# Patient Record
Sex: MALE | Race: WHITE | NOT HISPANIC OR LATINO | Employment: OTHER | URBAN - METROPOLITAN AREA
[De-identification: names, ages, dates, MRNs, and addresses within clinical notes are randomized per-mention and may not be internally consistent; named-entity substitution may affect disease eponyms.]

---

## 2017-01-03 ENCOUNTER — HOSPITAL ENCOUNTER (OUTPATIENT)
Dept: RADIOLOGY | Facility: HOSPITAL | Age: 72
Discharge: HOME | End: 2017-01-03
Attending: ANESTHESIOLOGY | Admitting: ANESTHESIOLOGY
Payer: MEDICARE

## 2017-01-03 DIAGNOSIS — M51.36 DDD (DEGENERATIVE DISC DISEASE), LUMBAR: ICD-10-CM

## 2017-01-04 ENCOUNTER — TELEPHONE (OUTPATIENT)
Dept: OPHTHALMOLOGY | Facility: CLINIC | Age: 72
End: 2017-01-04

## 2017-01-04 NOTE — TELEPHONE ENCOUNTER
----- Message from Amira Finley MA sent at 1/3/2017 11:41 AM CST -----  Contact: pt        ----- Message -----     From: RT Crystal     Sent: 1/3/2017   9:23 AM       To: [facility] Staff    pt , requesting an appt to have his eye lids scrapped again, thanks.

## 2017-01-16 ENCOUNTER — OFFICE VISIT (OUTPATIENT)
Dept: NEUROSURGERY | Facility: CLINIC | Age: 72
End: 2017-01-16
Payer: MEDICARE

## 2017-01-16 VITALS
BODY MASS INDEX: 28.58 KG/M2 | WEIGHT: 182.13 LBS | HEIGHT: 67 IN | DIASTOLIC BLOOD PRESSURE: 85 MMHG | SYSTOLIC BLOOD PRESSURE: 146 MMHG | TEMPERATURE: 99 F | HEART RATE: 80 BPM

## 2017-01-16 DIAGNOSIS — M51.37 DEGENERATION OF LUMBAR OR LUMBOSACRAL INTERVERTEBRAL DISC: ICD-10-CM

## 2017-01-16 DIAGNOSIS — M47.816 FACET HYPERTROPHY OF LUMBAR REGION: ICD-10-CM

## 2017-01-16 DIAGNOSIS — M54.16 LUMBAR RADICULOPATHY: ICD-10-CM

## 2017-01-16 DIAGNOSIS — E78.1 HYPERTRIGLYCERIDEMIA: ICD-10-CM

## 2017-01-16 DIAGNOSIS — M47.819 FACET ARTHROPATHY: ICD-10-CM

## 2017-01-16 DIAGNOSIS — M48.061 LUMBAR STENOSIS: ICD-10-CM

## 2017-01-16 DIAGNOSIS — M54.12 CERVICAL RADICULOPATHY: Primary | ICD-10-CM

## 2017-01-16 PROCEDURE — 99499 UNLISTED E&M SERVICE: CPT | Mod: S$GLB,,, | Performed by: NEUROLOGICAL SURGERY

## 2017-01-16 PROCEDURE — 1160F RVW MEDS BY RX/DR IN RCRD: CPT | Mod: S$GLB,,, | Performed by: NEUROLOGICAL SURGERY

## 2017-01-16 PROCEDURE — 99214 OFFICE O/P EST MOD 30 MIN: CPT | Mod: S$GLB,,, | Performed by: NEUROLOGICAL SURGERY

## 2017-01-16 PROCEDURE — 1159F MED LIST DOCD IN RCRD: CPT | Mod: S$GLB,,, | Performed by: NEUROLOGICAL SURGERY

## 2017-01-16 PROCEDURE — 1157F ADVNC CARE PLAN IN RCRD: CPT | Mod: S$GLB,,, | Performed by: NEUROLOGICAL SURGERY

## 2017-01-16 PROCEDURE — 1125F AMNT PAIN NOTED PAIN PRSNT: CPT | Mod: S$GLB,,, | Performed by: NEUROLOGICAL SURGERY

## 2017-01-16 NOTE — MR AVS SNAPSHOT
[facility]  [address] [facility] [place] [address]  Phone: [phone]  Fax: [phone]                  Luke Duff   2017 3:00 PM   Office Visit    Description:  Male : 1945   Provider:  Meredith Garcia MD   Department:  [facility] Neurosurgery           Reason for Visit     Lumbar Spine Pain (L-Spine)           Diagnoses this Visit        Comments    Cervical radiculopathy    -  Primary     Lumbar radiculopathy                To Do List           Future Appointments        Provider Department Dept Phone    2017 8:30 AM Jazz Abraham MD [facility] Ophthalmology [phone]    3/20/2017 9:30 AM [facility] MRI1 [facility] [phone]    3/20/2017 10:30 AM Meredith Garcia MD [facility] Neurosurgery [phone]      Goals (5 Years of Data)     None      [facility]     [facility] Line - 24/7 Assistance  Registered nurses in the [facility] provide clinical advisement, health education, appointment booking, and other advisory services.  Call for this free service at [phone].             Medications           Message regarding Medications     Verify the changes and/or additions to your medication regime listed below are the same as discussed with your clinician today.  If any of these changes or additions are incorrect, please notify your healthcare provider.             Verify that the below list of medications is an accurate representation of the medications you are currently taking.  If none reported, the list may be blank. If incorrect, please contact your healthcare provider. Carry this list with you in case of emergency.           Current Medications     ASPIRIN/SALICYLAMIDE/CAFFEINE (BC HEADACHE POWDER ORAL) Take 1 application by mouth as needed.     butalbital-acetaminophen-caffeine -40 mg (FIORICET, ESGIC) -40 mg per tablet Take 1 tablet by mouth every 6 (six) hours as  "needed.     ciprofloxacin HCl (CIPRO) 250 MG tablet Take 1 tablet (250 mg total) by mouth 2 (two) times daily.    cyclobenzaprine (FLEXERIL) 5 MG tablet Take 1 tablet (5 mg total) by mouth nightly as needed for Muscle spasms.    esomeprazole (NEXIUM) 40 MG capsule Take 1 capsule (40 mg total) by mouth before breakfast.    nutritional supplement - fiber Liqd Take by mouth. Juice plus    UNABLE TO FIND Take 1 Dose by mouth once daily. medication name: Digestive aid: herbal           Clinical Reference Information           Vital Signs - Last Recorded  Most recent update: 1/16/2017  2:55 PM by Crystal Sharma LPN    BP Pulse Temp Ht Wt BMI    (!) 146/85 80 98.5 °F (36.9 °C) (Oral) 5' 7" (1.702 m) 82.6 kg (182 lb 1.6 oz) 28.52 kg/m2      Blood Pressure          Most Recent Value    BP  (!)  146/85      Allergies as of 1/16/2017     Codeine      Immunizations Administered on Date of Encounter - 1/16/2017     None      Orders Placed During Today's Visit      Normal Orders This Visit    Ambulatory Referral to Physical Medicine Rehab     Future Labs/Procedures Expected by Expires    MRI Cervical Spine Without Contrast  1/16/2017 1/16/2018    EMG - 2 Extremities  As directed 1/16/2018    EMG - 2 Extremities  As directed 1/16/2018      [facility] Sign-Up     Activating your [facility] account is as easy as 1-2-3!     1) Visit [email or web address], select Sign Up Now, enter this activation code and your date of birth, then select Next.  [email or web address]  Expires: 3/2/2017  2:32 PM      2) Create a username and password to use when you visit [facility] in the future and select a security question in case you lose your password and select Next.    3) Enter your e-mail address and click Sign Up!    Additional Information  If you have questions, please e-mail [email or web address] or call [phone] to talk to our [facility] staff. Remember, [facility] is NOT to be used for urgent needs. For medical emergencies, dial 911.         "

## 2017-01-17 ENCOUNTER — OFFICE VISIT (OUTPATIENT)
Dept: OPHTHALMOLOGY | Facility: CLINIC | Age: 72
End: 2017-01-17
Payer: MEDICARE

## 2017-01-17 DIAGNOSIS — Z96.1 PSEUDOPHAKIA OF BOTH EYES: ICD-10-CM

## 2017-01-17 DIAGNOSIS — H02.88B MEIBOMIAN GLAND DYSFUNCTION (MGD) OF UPPER AND LOWER LIDS OF BOTH EYES: ICD-10-CM

## 2017-01-17 DIAGNOSIS — H52.7 REFRACTIVE ERROR: ICD-10-CM

## 2017-01-17 DIAGNOSIS — H11.123 CONJUNCTIVAL CONCRETIONS OF BOTH EYES: ICD-10-CM

## 2017-01-17 DIAGNOSIS — H53.8 BLURRED VISION: Primary | ICD-10-CM

## 2017-01-17 DIAGNOSIS — H02.88A MEIBOMIAN GLAND DYSFUNCTION (MGD) OF UPPER AND LOWER LIDS OF BOTH EYES: ICD-10-CM

## 2017-01-17 PROCEDURE — 99999 PR PBB SHADOW E&M-EST. PATIENT-LVL II: CPT | Mod: PBBFAC,,, | Performed by: OPHTHALMOLOGY

## 2017-01-17 PROCEDURE — 92014 COMPRE OPH EXAM EST PT 1/>: CPT | Mod: S$GLB,,, | Performed by: OPHTHALMOLOGY

## 2017-01-17 RX ORDER — NEOMYCIN SULFATE, POLYMYXIN B SULFATE, AND DEXAMETHASONE 3.5; 10000; 1 MG/G; [USP'U]/G; MG/G
OINTMENT OPHTHALMIC NIGHTLY
Qty: 3.5 G | Refills: 0 | Status: SHIPPED | OUTPATIENT
Start: 2017-01-17 | End: 2017-08-22 | Stop reason: ALTCHOICE

## 2017-01-17 NOTE — PATIENT INSTRUCTIONS
"  I recommend that you use artificial tears 2-3 times daily. Recommended brands include Systane, Refresh, Genteal, and Thera-tears. Generic drops are okay too. Avoid any drops that say they "get the red out" - these should not be used on a regular basis.    "

## 2017-01-17 NOTE — MR AVS SNAPSHOT
[facility]  [address]  [facility] [address]  Phone: [phone]  Fax: [phone]                  Luke Duff   2017 8:30 AM   Office Visit    Description:  Male : 1945   Provider:  Jazz Abraham MD   Department:  [facility]mology           Reason for Visit     Eye Problem     Blurred Vision           Diagnoses this Visit        Comments    Blurred vision    -  Primary     Meibomian gland dysfunction (MGD) of upper and lower lids of both eyes         Pseudophakia of both eyes         Refractive error         Conjunctival concretions of both eyes                To Do List           Future Appointments        Provider Department Dept Phone    2017 12:00 PM [facility] MRI1 [facility] [phone]    2017 1:00 PM Meredith Garcia MD [facility] [phone]      Goals (5 Years of Data)     None      Follow-Up and Disposition     Return if symptoms worsen or fail to improve.       These Medications        Disp Refills Start End    neomycin-polymyxin-dexamethasone (DEXACINE) 3.5 mg/g-10,000 unit/g-0.1 % Oint 3.5 g 0 2017     Place into both eyes every evening. - Both Eyes    Pharmacy: [facility] [address] [place] [address] #: [phone]         [facility]     [facility] Care Line -  Assistance  Registered nurses in the [facility] provide clinical advisement, health education, appointment booking, and other advisory services.  Call for this free service at [phone].             Medications           Message regarding Medications     Verify the changes and/or additions to your medication regime listed below are the same as discussed with your clinician today.  If any of these changes or additions are incorrect, please notify your healthcare provider.        START taking these NEW medications        Refills     "neomycin-polymyxin-dexamethasone (DEXACINE) 3.5 mg/g-10,000 unit/g-0.1 % Oint 0    Sig: Place into both eyes every evening.    Class: Normal    Route: Both Eyes           Verify that the below list of medications is an accurate representation of the medications you are currently taking.  If none reported, the list may be blank. If incorrect, please contact your healthcare provider. Carry this list with you in case of emergency.           Current Medications     ASPIRIN/SALICYLAMIDE/CAFFEINE (BC HEADACHE POWDER ORAL) Take 1 application by mouth as needed.     butalbital-acetaminophen-caffeine -40 mg (FIORICET, ESGIC) -40 mg per tablet Take 1 tablet by mouth every 6 (six) hours as needed.     esomeprazole (NEXIUM) 40 MG capsule Take 1 capsule (40 mg total) by mouth before breakfast.    nutritional supplement - fiber Liqd Take by mouth. Juice plus    UNABLE TO FIND Take 1 Dose by mouth once daily. medication name: Digestive aid: herbal    cyclobenzaprine (FLEXERIL) 5 MG tablet Take 1 tablet (5 mg total) by mouth nightly as needed for Muscle spasms.    neomycin-polymyxin-dexamethasone (DEXACINE) 3.5 mg/g-10,000 unit/g-0.1 % Oint Place into both eyes every evening.           Clinical Reference Information           Allergies as of 1/17/2017     Codeine      Immunizations Administered on Date of Encounter - 1/17/2017     None      Instructions      I recommend that you use artificial tears 2-3 times daily. Recommended brands include Systane, Refresh, Genteal, and Thera-tears. Generic drops are okay too. Avoid any drops that say they "get the red out" - these should not be used on a regular basis.         "

## 2017-01-17 NOTE — PROGRESS NOTES
Neurosurgery History and Physical    Patient ID: Luke Duff is a 71 y.o. male.    Chief Complaint   Patient presents with    Lumbar Spine Pain (L-Spine)     Long history of chronic, moderate-severe low back pain noting pain in his entire bilateral lower extremities to feet with intermittent numbness and weakness. R>L. Reports intermittent bladder incontinence. Pain is increased by prolonged and slightly alleviated as he begins walking. Pain described as sharp and burning. He did have a lumbar decompression in April of 2015. He did have relief following surgery, but began to have worsening recurrent pain a few months ago. Has done injections with minimal imp       He presents for right-sided buttock and thigh pain. He had similar pain on the left side prior to his surgery in 2015. He had L3-L4 and L4-L5 decompressions at the time.     He is also complaining of persistent left fifth digit numbness and right elbow pain radiating from the shoulder.     Review of Systems   Constitutional: Negative.    HENT: Negative.    Eyes: Negative.    Respiratory: Negative.    Cardiovascular: Negative.    Gastrointestinal: Negative.    Endocrine: Negative.    Genitourinary: Negative.    Musculoskeletal: Positive for back pain.   Skin: Negative.    Allergic/Immunologic: Negative.    Neurological: Positive for numbness. Negative for weakness.   Hematological: Negative.    Psychiatric/Behavioral: Negative.        Past Medical History   Diagnosis Date    Arthritis     Cancer      Left arm, skin    Chronic lower back pain      radiates to both legs and feet, numbness and tingling, feet and calves    Chronic sinusitis     DDD (degenerative disc disease), lumbar     GERD (gastroesophageal reflux disease)     Headache(784.0)     HEARING LOSS      both sides, no hearing aides    Posterior capsular opacification visually significant of left eye 2/10/2016     Social History     Social History    Marital status:       Spouse name: N/A    Number of children: N/A    Years of education: N/A     Occupational History    Not on file.     Social History Main Topics    Smoking status: Former Smoker     Packs/day: 1.00     Years: 23.00     Start date: 2/4/1961     Quit date: 2/6/1984    Smokeless tobacco: Never Used    Alcohol use 0.6 oz/week     1 Glasses of wine per week      Comment: 1 glass of red wine nightly/ occasionally    Drug use: No    Sexual activity: Not on file     Other Topics Concern    Not on file     Social History Narrative     Family History   Problem Relation Age of Onset    Heart disease Father     Stroke Mother     No Known Problems Sister     Amblyopia Neg Hx     Blindness Neg Hx     Cancer Neg Hx     Cataracts Neg Hx     Diabetes Neg Hx     Glaucoma Neg Hx     Hypertension Neg Hx     Macular degeneration Neg Hx     Retinal detachment Neg Hx     Strabismus Neg Hx     Thyroid disease Neg Hx      Review of patient's allergies indicates:   Allergen Reactions    Codeine Other (See Comments)     Severe stomach spasms       Current Outpatient Prescriptions:     ASPIRIN/SALICYLAMIDE/CAFFEINE (BC HEADACHE POWDER ORAL), Take 1 application by mouth as needed. , Disp: , Rfl:     butalbital-acetaminophen-caffeine -40 mg (FIORICET, ESGIC) -40 mg per tablet, Take 1 tablet by mouth every 6 (six) hours as needed. , Disp: , Rfl:     ciprofloxacin HCl (CIPRO) 250 MG tablet, Take 1 tablet (250 mg total) by mouth 2 (two) times daily., Disp: 40 tablet, Rfl: 0    cyclobenzaprine (FLEXERIL) 5 MG tablet, Take 1 tablet (5 mg total) by mouth nightly as needed for Muscle spasms., Disp: 30 tablet, Rfl: 2    esomeprazole (NEXIUM) 40 MG capsule, Take 1 capsule (40 mg total) by mouth before breakfast. (Patient taking differently: Take 40 mg by mouth daily as needed. ), Disp: 90 capsule, Rfl: 3    nutritional supplement - fiber Liqd, Take by mouth. Juice plus, Disp: , Rfl:     UNABLE TO FIND, Take 1 Dose  "by mouth once daily. medication name: Digestive aid: herbal, Disp: , Rfl:   Blood pressure (!) 146/85, pulse 80, temperature 98.5 °F (36.9 °C), temperature source Oral, height 5' 7" (1.702 m), weight 82.6 kg (182 lb 1.6 oz).      Neurologic Exam     Mental Status   Oriented to person, place, and time.   Attention: normal. Concentration: normal.   Speech: speech is normal   Level of consciousness: alert  Knowledge: good.     Cranial Nerves     CN II   Visual acuity: normal    CN III, IV, VI   Pupils are equal, round, and reactive to light.  Extraocular motions are normal.     CN V   Facial sensation intact.     CN VII   Facial expression full, symmetric.     CN VIII   Hearing: intact    CN IX, X   Palate: symmetric    CN XI   CN XI normal.     CN XII   CN XII normal.     Motor Exam   Muscle bulk: normal  Overall muscle tone: normal  Right arm pronator drift: absent  Left arm pronator drift: absent    Strength   Right neck flexion: 5/5  Left neck flexion: 5/5  Right neck extension: 5/5  Left neck extension: 5/5  Right deltoid: 5/5  Left deltoid: 5/5  Right biceps: 5/5  Left biceps: 5/5  Right triceps: 5/5  Left triceps: 5/5  Right wrist flexion: 5/5  Left wrist flexion: 5/5  Right wrist extension: 5/5  Left wrist extension: 5/5  Right interossei: 5/5  Left interossei: 5/5  Right abdominals: 5/5  Left abdominals: 5/5  Right iliopsoas: 5/5  Left iliopsoas: 5/5  Right quadriceps: 5/5  Left quadriceps: 5/5  Right hamstrin/5  Left hamstrin/5  Right glutei: 5/5  Left glutei: 5/5  Right anterior tibial: 5/5  Left anterior tibial: 5/5  Right posterior tibial: 5/5  Left posterior tibial: 5/5  Right peroneal: 5/5  Left peroneal: 5/5  Right gastroc: 5/5  Left gastroc: 5/5    Sensory Exam   Light touch normal.     Gait, Coordination, and Reflexes     Gait  Gait: normal    Coordination   Romberg: negative  Finger to nose coordination: normal    Tremor   Resting tremor: absent    Reflexes   Right brachioradialis: 2+  Left " "brachioradialis: 2+  Right biceps: 2+  Left biceps: 2+  Right triceps: 2+  Left triceps: 2+  Right patellar: 2+  Left patellar: 2+  Right achilles: 1+  Left achilles: 1+  Right plantar: normal  Left plantar: normal  Right De Jesus: absent  Left De Jesus: absent  Right ankle clonus: absent  Left ankle clonus: absent      Physical Exam   Constitutional: He is oriented to person, place, and time. He appears well-developed and well-nourished.   HENT:   Head: Normocephalic and atraumatic.   Eyes: EOM are normal. Pupils are equal, round, and reactive to light.   Neck: Normal range of motion. Neck supple.   Cardiovascular: Normal rate and regular rhythm.    Pulmonary/Chest: Effort normal.   Abdominal: Soft.   Musculoskeletal: Normal range of motion.   Neurological: He is alert and oriented to person, place, and time. He has a normal Finger-Nose-Finger Test and a normal Romberg Test. Gait normal.   Reflex Scores:       Tricep reflexes are 2+ on the right side and 2+ on the left side.       Bicep reflexes are 2+ on the right side and 2+ on the left side.       Brachioradialis reflexes are 2+ on the right side and 2+ on the left side.       Patellar reflexes are 2+ on the right side and 2+ on the left side.       Achilles reflexes are 1+ on the right side and 1+ on the left side.  Skin: Skin is warm and dry.   Psychiatric: He has a normal mood and affect. His speech is normal and behavior is normal. Judgment and thought content normal.   Nursing note and vitals reviewed.      Vital Signs  Temp: 98.5 °F (36.9 °C)  Temp src: Oral  Pulse: 80  BP: (!) 146/85  Pain Score:   5  Pain Loc: Back  Height and Weight  Height: 5' 7" (170.2 cm)  Weight: 82.6 kg (182 lb 1.6 oz)  BSA (Calculated - sq m): 1.98 sq meters  BMI (Calculated): 28.6  Weight in (lb) to have BMI = 25: 159.3]    Provider dictation:  I reviewed the imaging. There is good central decompression from L3 to L5 but some ongoing foraminal stenosis, although this is slightly " worst on the left. He has undergone several ESIs and B/L medial branch blocks with overall a good response to his back and leg pain.     He did not have a Tinel at the elbows. It is possible that his left fifth finger numbness is related to ulnar neuropathy. I am also concerned that he may have some cervical spine pathology. He has evidence of disc degeneration with a grade I anterolisthesis of C5 on C6 on recent C spine X rays. This could also account for his right shoulder and elbow pain.     I would like to obtain an EMG/NCT of all 4 extremities to work him up for possible lumbar and cervical radiculopathy and ulnar neuropathy. I will order an MRI of the cervical spine to look for any compressive pathology. I will refer him to physical therapy for gentle lumbar spine traction. Return to clinic after the EMG.    Visit Diagnosis:  Cervical radiculopathy  -     Ambulatory Referral to Physical Medicine Rehab  -     MRI Cervical Spine Without Contrast; Future; Expected date: 1/16/17  -     EMG - 2 Extremities; Future  -     EMG - 2 Extremities; Future    Lumbar radiculopathy  -     Ambulatory Referral to Physical Medicine Rehab  -     EMG - 2 Extremities; Future  -     EMG - 2 Extremities; Future  -     Cancel: Ambulatory Referral to Physical/Occupational Therapy  -     Ambulatory Referral to Physical/Occupational Therapy    Hypertriglyceridemia    Facet arthropathy    Facet hypertrophy of lumbar region    Lumbar stenosis    Degeneration of lumbar or lumbosacral intervertebral disc

## 2017-01-17 NOTE — PROGRESS NOTES
HPI     Eye Problem    Additional comments: alexander feeling OU//           Blurred Vision    Additional comments: blurred va x since at least last visit OU//           Comments   alexander feeling and blurry va OU// blurred va x since at least last visit   OU//    Does not recall YAG capsulotomy OS from last year, but says Dr. Greenberg   peeled something off of both eyes last year, like a film. States symptoms   were better for a while, then started getting worse. Now has trouble   seeing again, reading, TV. Also trouble with driving at night - lights   bother him. Using Refresh about QOD, only lasts for a while. Distance   seems to be ok, but reading for a while seems to make it worse.        Last edited by Jazz Abraham MD on 1/17/2017  9:28 AM.   (History)        ROS     Positive for: Gastrointestinal (GERD), Neurological (radiculopathy),   Musculoskeletal, Eyes (pseudophakia OU; YAG capsulotomy OS Dr. Greenberg   1/13/16)    Negative for: Endocrine, Cardiovascular    Last edited by Jazz Abraham MD on 1/17/2017  9:12 AM.   (History)        Assessment /Plan     For exam results, see Encounter Report.    Blurred vision    Meibomian gland dysfunction (MGD) of upper and lower lids of both eyes    Pseudophakia of both eyes    Refractive error    Conjunctival concretions of both eyes    Other orders  -     neomycin-polymyxin-dexamethasone (DEXACINE) 3.5 mg/g-10,000 unit/g-0.1 % Oint; Place into both eyes every evening.  Dispense: 3.5 g; Refill: 0          Warm compresses, lid hygiene. Handout given.    Meibomian glands expressed today. Emphasized importance of maintaining warm compresses and lid hygiene indefinitely    States he cannot tolerate PO Flaxseed oil    RTC to drain concretions if symptoms not improved with above plus increasing frequency of artificial tears.     Declines MRx

## 2017-02-21 ENCOUNTER — OFFICE VISIT (OUTPATIENT)
Dept: FAMILY MEDICINE | Facility: CLINIC | Age: 72
End: 2017-02-21
Payer: MEDICARE

## 2017-02-21 VITALS
HEART RATE: 76 BPM | TEMPERATURE: 98 F | WEIGHT: 190.25 LBS | SYSTOLIC BLOOD PRESSURE: 122 MMHG | RESPIRATION RATE: 16 BRPM | DIASTOLIC BLOOD PRESSURE: 78 MMHG | HEIGHT: 67 IN | BODY MASS INDEX: 29.86 KG/M2

## 2017-02-21 DIAGNOSIS — S90.851A FOREIGN BODY IN FOOT, RIGHT, INITIAL ENCOUNTER: ICD-10-CM

## 2017-02-21 DIAGNOSIS — L57.0 KERATOMA: Primary | ICD-10-CM

## 2017-02-21 PROCEDURE — 1159F MED LIST DOCD IN RCRD: CPT | Mod: S$GLB,,, | Performed by: PHYSICIAN ASSISTANT

## 2017-02-21 PROCEDURE — 1125F AMNT PAIN NOTED PAIN PRSNT: CPT | Mod: S$GLB,,, | Performed by: PHYSICIAN ASSISTANT

## 2017-02-21 PROCEDURE — 99999 PR PBB SHADOW E&M-EST. PATIENT-LVL III: CPT | Mod: PBBFAC,,, | Performed by: PHYSICIAN ASSISTANT

## 2017-02-21 PROCEDURE — 99213 OFFICE O/P EST LOW 20 MIN: CPT | Mod: 25,S$GLB,, | Performed by: PHYSICIAN ASSISTANT

## 2017-02-21 PROCEDURE — 11306 SHAVE SKIN LESION 0.6-1.0 CM: CPT | Mod: S$GLB,,, | Performed by: PHYSICIAN ASSISTANT

## 2017-02-21 PROCEDURE — 1157F ADVNC CARE PLAN IN RCRD: CPT | Mod: S$GLB,,, | Performed by: PHYSICIAN ASSISTANT

## 2017-02-21 PROCEDURE — 1160F RVW MEDS BY RX/DR IN RCRD: CPT | Mod: S$GLB,,, | Performed by: PHYSICIAN ASSISTANT

## 2017-02-22 NOTE — PROGRESS NOTES
Subjective:       Patient ID: Luke Duff is a 71 y.o. male.    Chief Complaint: Foot Injury (rt foot pain x 2 months)    HPI   Pt has had persistent discomfort  On and off since stepping on sharp rock while barefoot 2 mos ago  Now has a thickened corn at site of injury that feels like a small rock in his shoe when walking  Possible foreign body  Review of Systems   Constitutional: Negative.    HENT: Negative.    Eyes: Negative.    Respiratory: Negative.    Cardiovascular: Negative.    Gastrointestinal: Negative.    Endocrine: Negative.    Genitourinary: Negative.    Musculoskeletal: Positive for gait problem.        Foot pain   Skin: Negative.  Negative for rash and wound.       Objective:      Physical Exam   Constitutional: He is oriented to person, place, and time. He appears well-developed and well-nourished. No distress.   HENT:   Head: Normocephalic and atraumatic.   Eyes: Conjunctivae are normal. No scleral icterus.   Neck: Normal range of motion. Neck supple.   Musculoskeletal: He exhibits tenderness. He exhibits no edema or deformity.   Neurological: He is alert and oriented to person, place, and time.   Skin: Skin is warm and dry. No rash noted.   Tender 1 cm keratotic lesion hard plantar ball of R foot at the base of the 4th digit   Vitals reviewed.      Assessment:       1. Keratoma    2. Foreign body in foot, right, initial encounter        Plan:       Luke was seen today for foot injury.    Diagnoses and all orders for this visit:    Keratoma    Foreign body in foot, right, initial encounter      Keratoma was shaved down using #15 scalpel blade  No FB was found  Pt tolerated procedure well  Pt felt much better after procedure  Will soak foot  F/u if sx return

## 2017-03-08 PROBLEM — M17.11 OSTEOARTHRITIS OF RIGHT KNEE: Status: ACTIVE | Noted: 2017-03-08

## 2017-03-11 PROBLEM — I48.91 ATRIAL FIBRILLATION WITH RAPID VENTRICULAR RESPONSE: Status: ACTIVE | Noted: 2017-03-11

## 2017-03-12 PROBLEM — R50.82 POST-PROCEDURAL FEVER: Status: ACTIVE | Noted: 2017-03-12

## 2017-03-12 PROBLEM — D72.829 LEUKOCYTOSIS: Status: ACTIVE | Noted: 2017-03-12

## 2017-07-20 ENCOUNTER — OFFICE VISIT (OUTPATIENT)
Dept: PAIN MEDICINE | Facility: CLINIC | Age: 72
End: 2017-07-20
Payer: MEDICARE

## 2017-07-20 VITALS
BODY MASS INDEX: 29 KG/M2 | DIASTOLIC BLOOD PRESSURE: 70 MMHG | HEART RATE: 74 BPM | RESPIRATION RATE: 18 BRPM | TEMPERATURE: 99 F | SYSTOLIC BLOOD PRESSURE: 140 MMHG | WEIGHT: 190.69 LBS

## 2017-07-20 DIAGNOSIS — M47.816 FACET HYPERTROPHY OF LUMBAR REGION: Primary | ICD-10-CM

## 2017-07-20 PROCEDURE — 1125F AMNT PAIN NOTED PAIN PRSNT: CPT | Mod: S$GLB,,, | Performed by: PHYSICIAN ASSISTANT

## 2017-07-20 PROCEDURE — 1159F MED LIST DOCD IN RCRD: CPT | Mod: S$GLB,,, | Performed by: PHYSICIAN ASSISTANT

## 2017-07-20 PROCEDURE — 99999 PR PBB SHADOW E&M-EST. PATIENT-LVL III: CPT | Mod: PBBFAC,,, | Performed by: PHYSICIAN ASSISTANT

## 2017-07-20 PROCEDURE — 99214 OFFICE O/P EST MOD 30 MIN: CPT | Mod: S$GLB,,, | Performed by: PHYSICIAN ASSISTANT

## 2017-07-20 PROCEDURE — 99499 UNLISTED E&M SERVICE: CPT | Mod: S$GLB,,, | Performed by: PHYSICIAN ASSISTANT

## 2017-07-21 DIAGNOSIS — M47.816 LUMBAR FACET ARTHROPATHY: Primary | ICD-10-CM

## 2017-07-21 RX ORDER — MIDAZOLAM HYDROCHLORIDE 5 MG/ML
4 INJECTION INTRAMUSCULAR; INTRAVENOUS ONCE
Status: CANCELLED | OUTPATIENT
Start: 2017-08-09

## 2017-07-21 RX ORDER — SODIUM CHLORIDE, SODIUM LACTATE, POTASSIUM CHLORIDE, CALCIUM CHLORIDE 600; 310; 30; 20 MG/100ML; MG/100ML; MG/100ML; MG/100ML
INJECTION, SOLUTION INTRAVENOUS CONTINUOUS
Status: CANCELLED | OUTPATIENT
Start: 2017-08-09

## 2017-07-21 NOTE — PROGRESS NOTES
CC: Back pain    HPI: The patient is a 72-year-old man with a history of GERD, COPD, presents in referral from Dr. Eldridge for bilateral foot pain, low back pain.  He returns in follow-up today with back pain.  He has soreness in the right low back and right buttock but severe pain in the left low back radiating to the left lateral hip and left groin.  This is worse with walking and also when he gets up in the mornings.  He reports having a right total knee replacement on 3/17.  He denies any specific weakness but does report a little numbness in his left lateral hip.  He denies bladder or bowel incontinence.    Pain intervention history: He has seen Dr. You for metatarsalgia with Redmond's neuroma, is considering surgical options.  Had some injections in the feet without any major relief.  He tried gabapentin but felt cognitively impaired with this. Left L4 transforaminal epidural steroid injection on 12/17/12 with 85% pain to back, 60% to left side of low back and bilateral foot pain about 10%. He returns in followup today he is status post left L4/5 transforaminal injection on 11/12/13 With about 6 months of relief.  He is status post L4/5 interlaminar epidural steroid injection on 5/19/14 with greater than 50% relief.  He is status post left L3 and L4 transforaminal epidural steroid injection on 3/9/16 with 40% relief.  He is status post left L3 and L4 transforaminal epidural steroid injection on 4/8/16 with 60% relief.  He is status post left GTB injection on 5/2/16 with moderate relief lasting a few weeks.  He is status post left L2, 3, 4 and 5 medial branch radiofrequency ablation on 7/8/16 with 100% relief of his left sided pain.  He is status post right L2, 3, 4 and 5 medial branch radiofrequency ablation on 9/28/16 with almost complete relief of his right low back pain.    ROS:He reports joint pain, back pain.  Balance of review of systems negative.    Medical, surgical, family and social history reviewed  elsewhere in record.    Medications/Allergies: See med card      Vitals:    07/20/17 0846   BP: (!) 140/70   Pulse: 74   Resp: 18   Temp: 98.6 °F (37 °C)   TempSrc: Oral   Weight: 86.5 kg (190 lb 11.2 oz)   PainSc:   4   PainLoc: Back         Physical exam:  Gen: A and O x3, pleasant, well-groomed  Skin: No rashes or obvious lesions  HEENT: PERRLA  CVS: Regular rate and rhythm, normal S1 and S2, no murmurs.  Resp: Clear to auscultation bilaterally, no wheezes or rales.  Musculoskeletal: Able to heel walk, toe walk. No antalgic gait.     Neuro:  Upper extremities: 5/5 strength bilaterally  Lower extremities: 5/5 strength bilaterally  Reflexes: Brachioradialis 2+, Bicep 2+, Tricep 2+. Patellar 2+, Achilles 2+.  Sensory: Intact and symmetrical to light touch and pinprick in C2-T1 dermatomes bilaterally. Intact and symmetrical to light touch and pinprick in L2-S1 dermatomes bilaterally.    Lumbar spine:  Lumbar spine: ROM is mildly limited with flexion, extension and oblique extension with increased left low back pain during each maneuver but especially with left oblique extension.  Jimmy's test is negative bilaterally.  Supine straight leg raise is negative bilaterally.    Internal and external rotation of the hip is negative bilaterally.  Myofascial exam: Mild tenderness palpation to the left lumbar paraspinous muscles.      Imaging:  MRI Lumbar Spine 11/8/2012  L1-L2: There is minimal posterior disc bulge extending less than 2 mm into the spinal canal. There is minimal thecal sac compression and no foraminal compromise.  L2-L3: There is minimal posterior disc bulge extending less than 2 mm into the spinal canal. There is minimal thecal sac compression and no foraminal compromise.  L3-L4: There is a diffuse 3-mm posterior disc bulge. There is mild degenerative facet arthrosis with ligamentum flavum thickening. A combination of disc bulge and facet arthrosis results in moderate spinal canal stenosis with concentric  compression of the thecal sac to an AP diameter of 8 mm and moderate bilateral foraminal stenosis.  L4-L5: There is a tear of the posterior disc annulus associated with a diffuse 3-mm posterior disc bulge. There is mild degenerative facet arthrosis with ligamentum flavum thickening. A combination of disc bulge and facet arthrosis results in moderate spinal canal stenosis with concentric compression of the thecal sac to an AP diameter of 8 mm in moderate bilateral foraminal stenosis.  L5-S1: There is minimal posterior disc bulge extending less than 2 mm into the spinal canal. There is minimal thecal sac compression and there is no foraminal compromise.    MRI L-spine 11/13/2008: At L1-2 mild broad-based disc bulge asymmetric to the left may contact exiting nerve root.  At L2/3 mild broad-based disc bulge more lateral to the left exiting nerve root on the left comes near the disc bulge.  At L3/4 broad-based disc bulge asymmetric to the left with facet hypertrophy and ligamentous hypertrophy causing mild central canal narrowing.  Moderate left foraminal stenosis.  At L4/5 there is facet arthropathy, broad-based disc bulge causing moderate bilateral foraminal stenosis and mild central canal narrowing.  Possible annular tear at that level.  This most comes near the exiting nerve root at that level.  At L5/S1 no significant central canal stenosis or disc bulging.    MRI lumbar spine 1/5/15  The lumbar vertebral bodies show normal height and signal intensity without evidence of acute compression fracture or pathologic marrow replacement process. Very few scattered incidentally observed vertebral body hemangiomas present throughout the lumbar spine. There is a grade I retrolisthesis of L3 on L4. There is degenerative disc desiccation at every lumbar level with relative sparing of the L5-S1 intervertebral disc. The conus medullaris terminates at L1-L2. The visualized lower thoracic spinal cord is normal in signal intensity.  The incidentally observed retroperitoneal soft tissues are unremarkable.  T12-L1: There is ligamentum flavum thickening. No central canal or neuroforaminal stenosis. No disc protrusion or extrusion.  L1-L2: There is an annular fissure present within the right paracentral portion of the intervertebral disc. No central canal or neuroforaminal stenosis. No disc protrusion or extrusion.  L2-L3: There is a broad disc bulge with a central annular tear. There is mild bilateral hypertrophic facet arthropathy. No central canal or neuroforaminal stenosis. No disc protrusion or extrusion.  L3-L4: There is a broad disc bulge, ligamentum flavum thickening, and hypertrophic facet arthropathy. There is an annular fissure present within the central intervertebral disc. There is mild-moderate central canal stenosis. There is moderate proximalleft neuroforaminal stenosis. There is mild right neuroforaminal stenosis.  L4-L5: There is a broad disc bulge with a central annular fissure/tear. There is ligamentum flavum thickening and hypertrophic facet arthropathy. There is, at most, mild overall central canal stenosis. No neuroforaminal stenosis.  L5-S1: There is bilateral facet arthropathy.    MRI lumbar spine 2/16/16  Vertebral body heights appear well-preserved. Vertebral body alignment appears adequate. Decreased T2 signal is noted at all lumbar levels consistent with degenerative disk desiccation. Postsurgical scarring is noted at L4 level with postsurgical partial laminectomy suspected at L4-L5 level. Spurring is noted at the SI joints bilaterally.  No STIR signal abnormality is noted to suggest an aggressive osseous process.  At the T12-L1 level, no significant disk bulge, central canal stenosis, or foraminal stenosis noted  At the L1-L2 level, mild broad-based bulging is noted towards the left neuroforamen more noticeable than right of approximately 3 mm. Mild left neural foraminal narrowing is noted with no significant  right-sided neural foraminal narrowing or central canal narrowing.  At L2-L3 level, facet arthropathy and ligamentous hypertrophy is noted. Broad-based bulging is noted towards the left neuroforamen greater than right of 3 mm with increased T2 signal suggestive of an annular tear . Mild left neural foraminal narrowing greater than right neural foraminal narrowing is noted. No convincing detrimental change is noted since the prior. Minimal central canal narrowing is noted  At the L3-L4 level, mild ligamentous hypertrophy appears to be present. Broad-based bulging appears to be present towards each neuroforamen with moderate neural foraminal stenosis on the left greater than right. This may be slightly progressed on the right since the prior, less central canal narrowing appears to be present on the prior  At L4-L5 level, evidence of laminectomy is noted. Mild broad-based bulging is noted towards the paracentral left in particular of 3-4 mm. Scar tissue surrounds the right nerve foramen. Mild to moderate left neural foraminal narrowing is suspected mild right neural foraminal narrowing. Less central canal narrowing is noted than on the prior with minimal central canal narrowing suspected. The neural foraminal narrowing on the left may be slightly progressed since the prior  At L5-S1 level, no significant disk bulge, central canal stenosis, or nerve foraminal stenosis noted.      Assessment:  The patient is a 72-year-old man with a history of GERD, COPD, presents in referral from Dr. Paz for bilateral foot pain, low back pain.      1. Facet hypertrophy of lumbar region        Plan:  1.  I believe his pain is facet mediated.  It has been just over a year since his last left-sided RFA.  I will schedule him to repeat left L2, 3, 4 and 5 medial branch radiofrequency ablation.  The right side can be repeated in the future if necessary.  2.  Follow-up in 4 weeks post procedure or sooner as needed.    Greater than 50% of  this 25 minute visit was spent on counseling the patient.

## 2017-08-09 ENCOUNTER — HOSPITAL ENCOUNTER (OUTPATIENT)
Facility: HOSPITAL | Age: 72
Discharge: HOME | End: 2017-08-09
Attending: ANESTHESIOLOGY | Admitting: ANESTHESIOLOGY
Payer: MEDICARE

## 2017-08-09 ENCOUNTER — SURGERY (OUTPATIENT)
Age: 72
End: 2017-08-09

## 2017-08-09 ENCOUNTER — HOSPITAL ENCOUNTER (OUTPATIENT)
Dept: RADIOLOGY | Facility: HOSPITAL | Age: 72
Discharge: HOME | End: 2017-08-09
Attending: ANESTHESIOLOGY | Admitting: ANESTHESIOLOGY
Payer: MEDICARE

## 2017-08-09 VITALS
OXYGEN SATURATION: 95 % | SYSTOLIC BLOOD PRESSURE: 144 MMHG | DIASTOLIC BLOOD PRESSURE: 73 MMHG | BODY MASS INDEX: 28.04 KG/M2 | HEART RATE: 75 BPM | TEMPERATURE: 97 F | WEIGHT: 185 LBS | HEIGHT: 68 IN | RESPIRATION RATE: 16 BRPM

## 2017-08-09 DIAGNOSIS — M47.816 LUMBAR FACET ARTHROPATHY: Primary | ICD-10-CM

## 2017-08-09 DIAGNOSIS — M51.36 DDD (DEGENERATIVE DISC DISEASE), LUMBAR: ICD-10-CM

## 2017-08-09 PROCEDURE — 64635 DESTROY LUMB/SAC FACET JNT: CPT | Mod: LT,,, | Performed by: ANESTHESIOLOGY

## 2017-08-09 PROCEDURE — 76000 FLUOROSCOPY <1 HR PHYS/QHP: CPT | Mod: TC,PO

## 2017-08-09 PROCEDURE — 25000003 PHARM REV CODE 250: Mod: PO | Performed by: ANESTHESIOLOGY

## 2017-08-09 PROCEDURE — 64636 DESTROY L/S FACET JNT ADDL: CPT | Mod: PO | Performed by: ANESTHESIOLOGY

## 2017-08-09 PROCEDURE — 63600175 PHARM REV CODE 636 W HCPCS: Mod: PO | Performed by: ANESTHESIOLOGY

## 2017-08-09 PROCEDURE — A4216 STERILE WATER/SALINE, 10 ML: HCPCS | Mod: PO | Performed by: ANESTHESIOLOGY

## 2017-08-09 PROCEDURE — 64635 DESTROY LUMB/SAC FACET JNT: CPT | Mod: PO | Performed by: ANESTHESIOLOGY

## 2017-08-09 PROCEDURE — 64636 DESTROY L/S FACET JNT ADDL: CPT | Mod: LT,,, | Performed by: ANESTHESIOLOGY

## 2017-08-09 PROCEDURE — 99152 MOD SED SAME PHYS/QHP 5/>YRS: CPT | Mod: ,,, | Performed by: ANESTHESIOLOGY

## 2017-08-09 RX ORDER — LIDOCAINE HYDROCHLORIDE 10 MG/ML
1 INJECTION, SOLUTION EPIDURAL; INFILTRATION; INTRACAUDAL; PERINEURAL ONCE
Status: COMPLETED | OUTPATIENT
Start: 2017-08-09 | End: 2017-08-09

## 2017-08-09 RX ORDER — METHYLPREDNISOLONE ACETATE 40 MG/ML
INJECTION, SUSPENSION INTRA-ARTICULAR; INTRALESIONAL; INTRAMUSCULAR; SOFT TISSUE
Status: DISCONTINUED | OUTPATIENT
Start: 2017-08-09 | End: 2017-08-09 | Stop reason: HOSPADM

## 2017-08-09 RX ORDER — LIDOCAINE HYDROCHLORIDE 20 MG/ML
INJECTION, SOLUTION EPIDURAL; INFILTRATION; INTRACAUDAL; PERINEURAL
Status: DISCONTINUED | OUTPATIENT
Start: 2017-08-09 | End: 2017-08-09 | Stop reason: HOSPADM

## 2017-08-09 RX ORDER — LIDOCAINE HYDROCHLORIDE 10 MG/ML
INJECTION, SOLUTION EPIDURAL; INFILTRATION; INTRACAUDAL; PERINEURAL
Status: DISCONTINUED | OUTPATIENT
Start: 2017-08-09 | End: 2017-08-09 | Stop reason: HOSPADM

## 2017-08-09 RX ORDER — MIDAZOLAM HYDROCHLORIDE 5 MG/ML
4 INJECTION INTRAMUSCULAR; INTRAVENOUS ONCE
Status: COMPLETED | OUTPATIENT
Start: 2017-08-09 | End: 2017-08-09

## 2017-08-09 RX ORDER — SODIUM CHLORIDE, SODIUM LACTATE, POTASSIUM CHLORIDE, CALCIUM CHLORIDE 600; 310; 30; 20 MG/100ML; MG/100ML; MG/100ML; MG/100ML
INJECTION, SOLUTION INTRAVENOUS CONTINUOUS
Status: DISCONTINUED | OUTPATIENT
Start: 2017-08-09 | End: 2017-08-09 | Stop reason: HOSPADM

## 2017-08-09 RX ORDER — SODIUM CHLORIDE 9 MG/ML
INJECTION, SOLUTION INTRAMUSCULAR; INTRAVENOUS; SUBCUTANEOUS
Status: DISCONTINUED | OUTPATIENT
Start: 2017-08-09 | End: 2017-08-09 | Stop reason: HOSPADM

## 2017-08-09 RX ORDER — OMEPRAZOLE 20 MG/1
20 CAPSULE, DELAYED RELEASE ORAL DAILY
COMMUNITY
End: 2018-08-13

## 2017-08-09 RX ORDER — FENTANYL CITRATE 50 UG/ML
INJECTION, SOLUTION INTRAMUSCULAR; INTRAVENOUS
Status: DISCONTINUED | OUTPATIENT
Start: 2017-08-09 | End: 2017-08-09 | Stop reason: HOSPADM

## 2017-08-09 RX ADMIN — SODIUM CHLORIDE, SODIUM LACTATE, POTASSIUM CHLORIDE, AND CALCIUM CHLORIDE: .6; .31; .03; .02 INJECTION, SOLUTION INTRAVENOUS at 01:08

## 2017-08-09 RX ADMIN — LIDOCAINE HYDROCHLORIDE 5 ML: 10 INJECTION, SOLUTION EPIDURAL; INFILTRATION; INTRACAUDAL; PERINEURAL at 02:08

## 2017-08-09 RX ADMIN — MIDAZOLAM HYDROCHLORIDE 4 MG: 5 INJECTION, SOLUTION INTRAMUSCULAR; INTRAVENOUS at 02:08

## 2017-08-09 RX ADMIN — SODIUM CHLORIDE 4 ML: 9 INJECTION INTRAMUSCULAR; INTRAVENOUS; SUBCUTANEOUS at 02:08

## 2017-08-09 RX ADMIN — LIDOCAINE HYDROCHLORIDE: 10 INJECTION, SOLUTION EPIDURAL; INFILTRATION; INTRACAUDAL; PERINEURAL at 01:08

## 2017-08-09 RX ADMIN — FENTANYL CITRATE 25 MCG: 50 INJECTION, SOLUTION INTRAMUSCULAR; INTRAVENOUS at 02:08

## 2017-08-09 RX ADMIN — METHYLPREDNISOLONE ACETATE 40 MG: 40 INJECTION, SUSPENSION INTRA-ARTICULAR; INTRALESIONAL; INTRAMUSCULAR; SOFT TISSUE at 02:08

## 2017-08-09 RX ADMIN — LIDOCAINE HYDROCHLORIDE 4 ML: 20 INJECTION, SOLUTION EPIDURAL; INFILTRATION; INTRACAUDAL; PERINEURAL at 02:08

## 2017-08-09 NOTE — OP NOTE
PROCEDURE DATE: 8/9/2017    PROCEDURE:  Radiofrequency ablation of the left L2,3,4,5 medial branch nerves on the left-side utilizing fluoroscopy    DIAGNOSIS:  Lumbar facet arthopathy    Post op Diagnosis: Same    PHYSICIAN: Josafat Kahn MD    MEDICATIONS INJECTED:  From a mixture of 4ml of 2% lidocaine and 40mg of methylprednisone, 1ml of this solution was injected at each level.    LOCAL ANESTHETIC USED: Lidocaine 1%, 4 ml given at each site.    SEDATION MEDICATIONS: 4mg versed, 25mcg fentanyl    ESTIMATED BLOOD LOSS:  none    COMPLICATIONS:  none    TECHNIQUE:  A time out was taken to identify patient and procedure side prior to starting the procedure. Laying in a prone position, the patient was prepped and draped in the usual sterile fashion using ChloraPrep and sterile towels.  The levels were determined under fluoroscopic guidance and then marked.  Local anesthetic was given by raising a wheal at the skin over each site and then infiltrated approximately 2cm deeper.  A 20-gauge  100 mm [employer] RF needle was introduced to the anatomic location of the left L2,3,4,5 medial branch nerves.  Motor stimulation up to 2 Volts at each level confirmed no motor nerve involvement.  Impedance was less than 800 ohms at each level. The above noted medication was then injected slowly.  Ablation was performed per level utilizing Radha radiofrequency generator 80°C for 90 seconds. The patient tolerated the procedure well.     The patient was monitored after the procedure.  Patient was given post procedure and discharge instructions to follow at home.  The patient was discharged in a stable condition

## 2017-08-09 NOTE — DISCHARGE SUMMARY
[facility]  Discharge Note  Short Stay    Admit Date: 8/9/2017    Discharge Date: 8/9/2017    Attending Physician: Josafat Kahn MD     Discharge Provider: Josafat Kahn    Diagnoses:  Active Hospital Problems    Diagnosis  POA    *Lumbar facet arthropathy [M12.88]  Yes      Resolved Hospital Problems    Diagnosis Date Resolved POA   No resolved problems to display.       Discharged Condition: good    Final Diagnoses: Lumbar facet arthropathy [M12.88]    Disposition: Home or Self Care    Hospital Course: no complications, uneventful    Outcome of Hospitalization, Treatment, Procedure, or Surgery:  Patient was admitted for outpatient procedure. The patient underwent procedure without complications and are discharged home    Follow up/Patient Instructions:  Follow up as scheduled/Patient has received instructions and follow up date    Medications:  Continue previous medications      Discharge Procedure Orders  Diet general     Activity as tolerated     Call MD for:  temperature >100.4     Call MD for:  severe uncontrolled pain     Call MD for:  redness, tenderness, or signs of infection (pain, swelling, redness, odor or green/yellow discharge around incision site)     Call MD for:  severe persistent headache     No dressing needed           Discharge Procedure Orders (must include Diet, Follow-up, Activity):    Discharge Procedure Orders (must include Diet, Follow-up, Activity)  Diet general     Activity as tolerated     Call MD for:  temperature >100.4     Call MD for:  severe uncontrolled pain     Call MD for:  redness, tenderness, or signs of infection (pain, swelling, redness, odor or green/yellow discharge around incision site)     Call MD for:  severe persistent headache     No dressing needed

## 2017-08-21 ENCOUNTER — OFFICE VISIT (OUTPATIENT)
Dept: FAMILY MEDICINE | Facility: CLINIC | Age: 72
End: 2017-08-21
Payer: MEDICARE

## 2017-08-21 VITALS
TEMPERATURE: 98 F | HEIGHT: 68 IN | SYSTOLIC BLOOD PRESSURE: 135 MMHG | DIASTOLIC BLOOD PRESSURE: 80 MMHG | OXYGEN SATURATION: 98 % | WEIGHT: 188.94 LBS | HEART RATE: 90 BPM | BODY MASS INDEX: 28.63 KG/M2

## 2017-08-21 DIAGNOSIS — M79.672 LEFT FOOT PAIN: Primary | ICD-10-CM

## 2017-08-21 DIAGNOSIS — M79.672 PAIN OF LEFT HEEL: ICD-10-CM

## 2017-08-21 DIAGNOSIS — M72.2 PLANTAR FASCIITIS: ICD-10-CM

## 2017-08-21 DIAGNOSIS — M25.572 LEFT ANKLE PAIN, UNSPECIFIED CHRONICITY: ICD-10-CM

## 2017-08-21 PROCEDURE — 99214 OFFICE O/P EST MOD 30 MIN: CPT | Mod: S$GLB,,, | Performed by: PHYSICIAN ASSISTANT

## 2017-08-21 PROCEDURE — 99999 PR PBB SHADOW E&M-EST. PATIENT-LVL V: CPT | Mod: PBBFAC,,, | Performed by: PHYSICIAN ASSISTANT

## 2017-08-21 PROCEDURE — 1125F AMNT PAIN NOTED PAIN PRSNT: CPT | Mod: S$GLB,,, | Performed by: PHYSICIAN ASSISTANT

## 2017-08-21 PROCEDURE — 3008F BODY MASS INDEX DOCD: CPT | Mod: S$GLB,,, | Performed by: PHYSICIAN ASSISTANT

## 2017-08-21 PROCEDURE — 1159F MED LIST DOCD IN RCRD: CPT | Mod: S$GLB,,, | Performed by: PHYSICIAN ASSISTANT

## 2017-08-21 NOTE — PROGRESS NOTES
Subjective:       Patient ID: Luke Duff is a 72 y.o. male.    Chief Complaint: left ankle pain (x 2 months )    HPI   L foot, heel, and ankle pain x 2 mos   No hx recent trauma  Pt did have R knee replaced 3-8-17  No hx gout    Review of Systems   Constitutional: Positive for activity change. Negative for appetite change, chills, diaphoresis, fatigue, fever and unexpected weight change.   HENT: Negative.    Eyes: Negative.    Respiratory: Negative.    Cardiovascular: Negative.    Gastrointestinal: Negative.    Endocrine: Negative.    Genitourinary: Negative.    Musculoskeletal: Positive for arthralgias, gait problem, joint swelling and myalgias.   Skin: Negative.  Negative for rash.       Objective:      Physical Exam   Constitutional: He appears well-developed and well-nourished. No distress.   HENT:   Head: Normocephalic and atraumatic.   Eyes: No scleral icterus.   Neck: Normal range of motion. Neck supple. No tracheal deviation present. No thyromegaly present.   Musculoskeletal: He exhibits edema and tenderness. He exhibits no deformity.   Tenderness and mild edema L foot and ankle  Focal tenderness plantar heel area and Achilles tendon insertion site  Neurovascular intact    Lymphadenopathy:     He has no cervical adenopathy.   Skin: Skin is warm and dry. No rash noted.   Vitals reviewed.      Assessment:       1. Left foot pain    2. Pain of left heel    3. Left ankle pain, unspecified chronicity    4. Plantar fasciitis        Plan:       Luke was seen today for left ankle pain.    Diagnoses and all orders for this visit:    Left foot pain  -     X-Ray Foot Complete Left; Future  -     X-Ray Ankle Complete Left; Future  -     Ambulatory referral to Podiatry  -     CBC auto differential; Future  -     Comprehensive metabolic panel; Future  -     Sedimentation rate, manual; Future  -     Uric acid; Future    Pain of left heel  -     X-Ray Foot Complete Left; Future  -     X-Ray Ankle Complete Left;  Future  -     Ambulatory referral to Podiatry  -     CBC auto differential; Future  -     Comprehensive metabolic panel; Future  -     Sedimentation rate, manual; Future  -     Uric acid; Future    Left ankle pain, unspecified chronicity  -     X-Ray Foot Complete Left; Future  -     X-Ray Ankle Complete Left; Future  -     Ambulatory referral to Podiatry  -     CBC auto differential; Future  -     Comprehensive metabolic panel; Future  -     Sedimentation rate, manual; Future  -     Uric acid; Future    Plantar fasciitis  -     X-Ray Foot Complete Left; Future  -     X-Ray Ankle Complete Left; Future  -     Ambulatory referral to Podiatry  -     CBC auto differential; Future  -     Comprehensive metabolic panel; Future  -     Sedimentation rate, manual; Future  -     Uric acid; Future    discussed otc's  Rest  Home PT

## 2017-08-22 ENCOUNTER — HOSPITAL ENCOUNTER (OUTPATIENT)
Dept: RADIOLOGY | Facility: HOSPITAL | Age: 72
Discharge: HOME | End: 2017-08-22
Attending: PHYSICIAN ASSISTANT
Payer: MEDICARE

## 2017-08-22 ENCOUNTER — TELEPHONE (OUTPATIENT)
Dept: FAMILY MEDICINE | Facility: CLINIC | Age: 72
End: 2017-08-22

## 2017-08-22 ENCOUNTER — OFFICE VISIT (OUTPATIENT)
Dept: PODIATRY | Facility: CLINIC | Age: 72
End: 2017-08-22
Payer: MEDICARE

## 2017-08-22 VITALS — BODY MASS INDEX: 28.9 KG/M2 | HEIGHT: 68 IN | WEIGHT: 190.69 LBS

## 2017-08-22 DIAGNOSIS — M72.2 PLANTAR FASCIITIS: Primary | ICD-10-CM

## 2017-08-22 DIAGNOSIS — M25.572 LEFT ANKLE PAIN, UNSPECIFIED CHRONICITY: ICD-10-CM

## 2017-08-22 DIAGNOSIS — M77.9 ENTHESOPATHY: ICD-10-CM

## 2017-08-22 DIAGNOSIS — M79.672 PAIN OF LEFT HEEL: ICD-10-CM

## 2017-08-22 DIAGNOSIS — M21.6X2 ACQUIRED EQUINUS DEFORMITY OF BOTH FEET: ICD-10-CM

## 2017-08-22 DIAGNOSIS — M21.6X1 ACQUIRED EQUINUS DEFORMITY OF BOTH FEET: ICD-10-CM

## 2017-08-22 DIAGNOSIS — M79.672 FOOT PAIN, LEFT: ICD-10-CM

## 2017-08-22 DIAGNOSIS — M79.672 LEFT FOOT PAIN: ICD-10-CM

## 2017-08-22 DIAGNOSIS — M72.2 PLANTAR FASCIITIS: ICD-10-CM

## 2017-08-22 PROCEDURE — 73610 X-RAY EXAM OF ANKLE: CPT | Mod: 26,LT,, | Performed by: RADIOLOGY

## 2017-08-22 PROCEDURE — 1125F AMNT PAIN NOTED PAIN PRSNT: CPT | Mod: S$GLB,,, | Performed by: PODIATRIST

## 2017-08-22 PROCEDURE — 29540 STRAPPING ANKLE &/FOOT: CPT | Mod: LT,S$GLB,, | Performed by: PODIATRIST

## 2017-08-22 PROCEDURE — 3008F BODY MASS INDEX DOCD: CPT | Mod: S$GLB,,, | Performed by: PODIATRIST

## 2017-08-22 PROCEDURE — 99999 PR PBB SHADOW E&M-EST. PATIENT-LVL III: CPT | Mod: PBBFAC,,, | Performed by: PODIATRIST

## 2017-08-22 PROCEDURE — 99214 OFFICE O/P EST MOD 30 MIN: CPT | Mod: 25,S$GLB,, | Performed by: PODIATRIST

## 2017-08-22 PROCEDURE — 1159F MED LIST DOCD IN RCRD: CPT | Mod: S$GLB,,, | Performed by: PODIATRIST

## 2017-08-22 PROCEDURE — 73630 X-RAY EXAM OF FOOT: CPT | Mod: 26,LT,, | Performed by: RADIOLOGY

## 2017-08-22 RX ORDER — DICLOFENAC SODIUM 10 MG/G
2 GEL TOPICAL 2 TIMES DAILY
Qty: 1 TUBE | Refills: 0 | Status: SHIPPED | OUTPATIENT
Start: 2017-08-22 | End: 2017-10-26 | Stop reason: SDUPTHER

## 2017-08-22 NOTE — TELEPHONE ENCOUNTER
Please call pt and let him know all his lab tests look good  Pt's let him know the xrays of his foot and ankle show degenerative changes  His joints are just getting old

## 2017-08-22 NOTE — PROGRESS NOTES
Subjective:      Patient ID: Luke Duff is a 72 y.o. male.    Chief Complaint: Foot Pain (Left)    Luke is a 72 y.o. male who presents to the podiatry clinic  with complaint of  left foot pain heel and back of the ankle. Onset of the symptoms was bout two months ago. Precipitating event: none known, he did get his right knee replaced a few months back and has been on the left foot more in compensation. Current symptoms include: ability to bear weight, but with some pain. Aggravating factors: any weight bearing. Symptoms have gradually worsened. Patient has had prior foot problems, he had Neuroma surgery a little over a year ago, no complaints with that currently. Evaluation to date: plain films: Posterior calcaneal spur. Treatment to date: ITC spenco inserts which have not helped. . Patients rates pain 4/10 on pain scale.        Review of Systems   Constitution: Negative for chills, fever, weakness and malaise/fatigue.   Cardiovascular: Negative for claudication, cyanosis, dyspnea on exertion and leg swelling.   Respiratory: Negative for shortness of breath.    Skin: Negative for itching, nail changes, poor wound healing and rash.   Musculoskeletal: Positive for arthritis, back pain, joint pain and muscle cramps. Negative for muscle weakness and myalgias.   Gastrointestinal: Negative for nausea and vomiting.   Neurological: Negative for focal weakness, loss of balance, numbness and paresthesias.           Objective:      Physical Exam   Constitutional: He is oriented to person, place, and time. He appears well-developed and well-nourished. No distress.   Cardiovascular:   Pulses:       Dorsalis pedis pulses are 2+ on the right side, and 2+ on the left side.        Posterior tibial pulses are 2+ on the right side, and 2+ on the left side.   < 3 sec capillary refill time to toes 1-5 bilateral. Toes and feet are warm to touch proximally with normal distal cooling b/l. There is some hair growth on the feet and  toes b/l. There is mild edema b/l. No spider veins or varicosities present b/l.      Musculoskeletal:   Left: Pain with palpation to the posterior aspect of the calcaneus along the distal achilles tendon, most tender at the insertion. Pain with palpation along the medial plantar fascial band from the origin on the calcaneus into the plantar arch. No side to side compression tenderness at the calcaneus. No pain with palpation along the tarsal tunnel or PT tendon medially nor to the peroneal tendons laterally.     Equinus noted b/l ankles with < 5 deg DF noted. Pain with active plantar flexion against resistance left ankle, otherwise MMT 5/5 in DF/PF/Inv/Ev resistance with no reproduction of pain in any direction. Passive range of motion of ankle and pedal joints is painless b/l.         Neurological: He is alert and oriented to person, place, and time. He has normal strength. He displays no atrophy and no tremor. No sensory deficit. He exhibits normal muscle tone.   Reflex Scores:       Achilles reflexes are 2+ on the right side and 2+ on the left side.  Negative tinel sign bilateral.   Skin: Skin is warm, dry and intact. No abrasion, no bruising, no burn, no ecchymosis, no laceration, no lesion, no petechiae and no rash noted. He is not diaphoretic. No cyanosis or erythema. No pallor. Nails show no clubbing.   Skin temperature, texture and turgor within normal limits.   Psychiatric: He has a normal mood and affect. His behavior is normal.             Assessment:       Encounter Diagnoses   Name Primary?    Plantar fasciitis Yes    Foot pain, left     Enthesopathy     Acquired equinus deformity of both feet          Plan:       Luke was seen today for foot pain.    Diagnoses and all orders for this visit:    Plantar fasciitis    Foot pain, left    Enthesopathy    Acquired equinus deformity of both feet    Other orders  -     diclofenac sodium (VOLTAREN) 1 % Gel; Apply 2 g topically 2 (two) times daily.      I  counseled the patient on his conditions, their implications and medical management.    Patient will continue to wear over the counter arch supports and wear them in shoes whenever possible.  Athletic shoes intended for walking or running are usually best.    I applied a plantar rest strapping to the patient's left foot to offload symptomatic area, support the arch, and relieve pain.    Diclofenac gel to be applied to the areas for pain and inflammation. The patient was advised that NSAID-type medications have two very important potential side effects: gastrointestinal irritation including hemorrhage and renal injuries. He was asked to take the medication with food and to stop if he experiences any GI upset. I asked him to call for vomiting, abdominal pain or black/bloody stools. The patient expresses understanding of these issues and questions were answered.    Patient will stretch the tendo achilles complex three times daily as demonstrated in the office.  Literature was dispensed illustrating proper stretching technique.     X-ray reviewed with patient, there is a large bone spur to the posterior calcaneus at the insertion of the achilles, discussed surgical options however will avoid surgery and continue with above conservative options due to long difficult post operative period surgery would entail.     Return in 1 month, if there is not significant improvement can consider PT, PO steroid dose pack, Orthopedic boot, steroid injection to plantar fascia or any combination thereof deemed necessary.    Say Caceres DPM PGY-3

## 2017-09-08 ENCOUNTER — OFFICE VISIT (OUTPATIENT)
Dept: PAIN MEDICINE | Facility: CLINIC | Age: 72
End: 2017-09-08
Payer: MEDICARE

## 2017-09-08 ENCOUNTER — TELEPHONE (OUTPATIENT)
Dept: PAIN MEDICINE | Facility: CLINIC | Age: 72
End: 2017-09-08

## 2017-09-08 VITALS
SYSTOLIC BLOOD PRESSURE: 168 MMHG | BODY MASS INDEX: 28.57 KG/M2 | WEIGHT: 188.5 LBS | DIASTOLIC BLOOD PRESSURE: 82 MMHG | HEIGHT: 68 IN | HEART RATE: 80 BPM

## 2017-09-08 DIAGNOSIS — M51.36 DDD (DEGENERATIVE DISC DISEASE), LUMBAR: Primary | ICD-10-CM

## 2017-09-08 DIAGNOSIS — M47.816 FACET HYPERTROPHY OF LUMBAR REGION: ICD-10-CM

## 2017-09-08 DIAGNOSIS — M54.16 LUMBAR RADICULOPATHY: ICD-10-CM

## 2017-09-08 PROCEDURE — 99999 PR PBB SHADOW E&M-EST. PATIENT-LVL III: CPT | Mod: PBBFAC,,, | Performed by: PHYSICIAN ASSISTANT

## 2017-09-08 PROCEDURE — 1125F AMNT PAIN NOTED PAIN PRSNT: CPT | Mod: S$GLB,,, | Performed by: PHYSICIAN ASSISTANT

## 2017-09-08 PROCEDURE — 3008F BODY MASS INDEX DOCD: CPT | Mod: S$GLB,,, | Performed by: PHYSICIAN ASSISTANT

## 2017-09-08 PROCEDURE — 99214 OFFICE O/P EST MOD 30 MIN: CPT | Mod: S$GLB,,, | Performed by: PHYSICIAN ASSISTANT

## 2017-09-08 PROCEDURE — 1159F MED LIST DOCD IN RCRD: CPT | Mod: S$GLB,,, | Performed by: PHYSICIAN ASSISTANT

## 2017-09-08 PROCEDURE — 99499 UNLISTED E&M SERVICE: CPT | Mod: S$GLB,,, | Performed by: PHYSICIAN ASSISTANT

## 2017-09-08 RX ORDER — ALPRAZOLAM 1 MG/1
1 TABLET ORAL
Qty: 1 TABLET | Refills: 0 | Status: SHIPPED | OUTPATIENT
Start: 2017-09-08 | End: 2017-12-12

## 2017-09-08 NOTE — PROGRESS NOTES
CC: Back pain    HPI: The patient is a 72-year-old man with a history of GERD, COPD, presents in referral from Dr. Eldridge for bilateral foot pain, low back pain.  He is status post left L2, 3, 4 and 5 medial branch radiofrequency ablation on 8/9/17 with 100% relief of his arthritic pain.  He no longer has pain radiating to the left lateral hip or left groin.  However, he reports a different type of deep pain starting in the middle of the low back radiating along his waist bilaterally.  This is worse in the mornings when he first gets up or if he sits for too long.  He denies any radiation to his legs.  He denies weakness, numbness, bladder or bowel incontinence.    Pain intervention history: He has seen Dr. You for metatarsalgia with Redmond's neuroma, is considering surgical options.  Had some injections in the feet without any major relief.  He tried gabapentin but felt cognitively impaired with this. Left L4 transforaminal epidural steroid injection on 12/17/12 with 85% pain to back, 60% to left side of low back and bilateral foot pain about 10%. He returns in followup today he is status post left L4/5 transforaminal injection on 11/12/13 With about 6 months of relief.  He is status post L4/5 interlaminar epidural steroid injection on 5/19/14 with greater than 50% relief.  He is status post left L3 and L4 transforaminal epidural steroid injection on 3/9/16 with 40% relief.  He is status post left L3 and L4 transforaminal epidural steroid injection on 4/8/16 with 60% relief.  He is status post left GTB injection on 5/2/16 with moderate relief lasting a few weeks.  He is status post left L2, 3, 4 and 5 medial branch radiofrequency ablation on 7/8/16 with 100% relief of his left sided pain.  He is status post right L2, 3, 4 and 5 medial branch radiofrequency ablation on 9/28/16 with almost complete relief of his right low back pain.  He is status post left L2, 3, 4 and 5 medial branch radiofrequency ablation on 8/9/17  "with 100% relief of his arthritic pain.  He is status post left L2, 3, 4 and 5 medial branch radiofrequency ablation on 8/9/17 with 100% relief of his arthritic pain.      ROS:He reports joint pain, back pain.  Balance of review of systems negative.    Medical, surgical, family and social history reviewed elsewhere in record.    Medications/Allergies: See med card      Vitals:    09/08/17 0819   BP: (!) 168/82   Pulse: 80   Weight: 85.5 kg (188 lb 7.9 oz)   Height: 5' 8" (1.727 m)   PainSc:   8   PainLoc: Back         Physical exam:  Gen: A and O x3, pleasant, well-groomed  Skin: No rashes or obvious lesions  HEENT: PERRLA  CVS: Regular rate and rhythm, normal S1 and S2, no murmurs.  Resp: Clear to auscultation bilaterally, no wheezes or rales.  Musculoskeletal: Able to heel walk, toe walk. No antalgic gait.     Neuro:  Lower extremities: 5/5 strength bilaterally  Reflexes: Brachioradialis 2+, Bicep 2+, Tricep 2+. Patellar 2+, Achilles 2+.  Sensory: Intact and symmetrical to light touch and pinprick in C2-T1 dermatomes bilaterally. Intact and symmetrical to light touch and pinprick in L2-S1 dermatomes bilaterally.    Lumbar spine:  Lumbar spine: ROM is full with flexion and extension without increased pain.  Jimmy's test is negative bilaterally.  Supine straight leg raise is negative bilaterally.    Internal and external rotation of the hip is negative bilaterally.  Myofascial exam: No tenderness to palpation to the lumbar paraspinous muscles.      Imaging:  MRI Lumbar Spine 11/8/2012  L1-L2: There is minimal posterior disc bulge extending less than 2 mm into the spinal canal. There is minimal thecal sac compression and no foraminal compromise.  L2-L3: There is minimal posterior disc bulge extending less than 2 mm into the spinal canal. There is minimal thecal sac compression and no foraminal compromise.  L3-L4: There is a diffuse 3-mm posterior disc bulge. There is mild degenerative facet arthrosis with " ligamentum flavum thickening. A combination of disc bulge and facet arthrosis results in moderate spinal canal stenosis with concentric compression of the thecal sac to an AP diameter of 8 mm and moderate bilateral foraminal stenosis.  L4-L5: There is a tear of the posterior disc annulus associated with a diffuse 3-mm posterior disc bulge. There is mild degenerative facet arthrosis with ligamentum flavum thickening. A combination of disc bulge and facet arthrosis results in moderate spinal canal stenosis with concentric compression of the thecal sac to an AP diameter of 8 mm in moderate bilateral foraminal stenosis.  L5-S1: There is minimal posterior disc bulge extending less than 2 mm into the spinal canal. There is minimal thecal sac compression and there is no foraminal compromise.    MRI L-spine 11/13/2008: At L1-2 mild broad-based disc bulge asymmetric to the left may contact exiting nerve root.  At L2/3 mild broad-based disc bulge more lateral to the left exiting nerve root on the left comes near the disc bulge.  At L3/4 broad-based disc bulge asymmetric to the left with facet hypertrophy and ligamentous hypertrophy causing mild central canal narrowing.  Moderate left foraminal stenosis.  At L4/5 there is facet arthropathy, broad-based disc bulge causing moderate bilateral foraminal stenosis and mild central canal narrowing.  Possible annular tear at that level.  This most comes near the exiting nerve root at that level.  At L5/S1 no significant central canal stenosis or disc bulging.    MRI lumbar spine 1/5/15  The lumbar vertebral bodies show normal height and signal intensity without evidence of acute compression fracture or pathologic marrow replacement process. Very few scattered incidentally observed vertebral body hemangiomas present throughout the lumbar spine. There is a grade I retrolisthesis of L3 on L4. There is degenerative disc desiccation at every lumbar level with relative sparing of the L5-S1  intervertebral disc. The conus medullaris terminates at L1-L2. The visualized lower thoracic spinal cord is normal in signal intensity. The incidentally observed retroperitoneal soft tissues are unremarkable.  T12-L1: There is ligamentum flavum thickening. No central canal or neuroforaminal stenosis. No disc protrusion or extrusion.  L1-L2: There is an annular fissure present within the right paracentral portion of the intervertebral disc. No central canal or neuroforaminal stenosis. No disc protrusion or extrusion.  L2-L3: There is a broad disc bulge with a central annular tear. There is mild bilateral hypertrophic facet arthropathy. No central canal or neuroforaminal stenosis. No disc protrusion or extrusion.  L3-L4: There is a broad disc bulge, ligamentum flavum thickening, and hypertrophic facet arthropathy. There is an annular fissure present within the central intervertebral disc. There is mild-moderate central canal stenosis. There is moderate proximalleft neuroforaminal stenosis. There is mild right neuroforaminal stenosis.  L4-L5: There is a broad disc bulge with a central annular fissure/tear. There is ligamentum flavum thickening and hypertrophic facet arthropathy. There is, at most, mild overall central canal stenosis. No neuroforaminal stenosis.  L5-S1: There is bilateral facet arthropathy.    MRI lumbar spine 2/16/16  Vertebral body heights appear well-preserved. Vertebral body alignment appears adequate. Decreased T2 signal is noted at all lumbar levels consistent with degenerative disk desiccation. Postsurgical scarring is noted at L4 level with postsurgical partial laminectomy suspected at L4-L5 level. Spurring is noted at the SI joints bilaterally.  No STIR signal abnormality is noted to suggest an aggressive osseous process.  At the T12-L1 level, no significant disk bulge, central canal stenosis, or foraminal stenosis noted  At the L1-L2 level, mild broad-based bulging is noted towards the left  neuroforamen more noticeable than right of approximately 3 mm. Mild left neural foraminal narrowing is noted with no significant right-sided neural foraminal narrowing or central canal narrowing.  At L2-L3 level, facet arthropathy and ligamentous hypertrophy is noted. Broad-based bulging is noted towards the left neuroforamen greater than right of 3 mm with increased T2 signal suggestive of an annular tear . Mild left neural foraminal narrowing greater than right neural foraminal narrowing is noted. No convincing detrimental change is noted since the prior. Minimal central canal narrowing is noted  At the L3-L4 level, mild ligamentous hypertrophy appears to be present. Broad-based bulging appears to be present towards each neuroforamen with moderate neural foraminal stenosis on the left greater than right. This may be slightly progressed on the right since the prior, less central canal narrowing appears to be present on the prior  At L4-L5 level, evidence of laminectomy is noted. Mild broad-based bulging is noted towards the paracentral left in particular of 3-4 mm. Scar tissue surrounds the right nerve foramen. Mild to moderate left neural foraminal narrowing is suspected mild right neural foraminal narrowing. Less central canal narrowing is noted than on the prior with minimal central canal narrowing suspected. The neural foraminal narrowing on the left may be slightly progressed since the prior  At L5-S1 level, no significant disk bulge, central canal stenosis, or nerve foraminal stenosis noted.      Assessment:  The patient is a 72-year-old man with a history of GERD, COPD, presents in referral from Dr. Paz for bilateral foot pain, low back pain.      1. DDD (degenerative disc disease), lumbar    2. Facet hypertrophy of lumbar region    3. Lumbar radiculopathy        Plan:  1.  The patient did well following the left L2, 3, 4 and 5 medial branch RFA.  This can be repeated in the future if necessary.  He is now  experiencing a different type of pain starting at the lumbosacral junction radiating laterally.  I will update his lumbar spine MRI to further evaluate and I will call him with the results and recommendations.  Consideration can be made possibly for a caudal VANESSA versus transforaminal injections.    Greater than 50% of this 25 minute visit was spent on counseling the patient.

## 2017-09-22 ENCOUNTER — HOSPITAL ENCOUNTER (OUTPATIENT)
Dept: RADIOLOGY | Facility: HOSPITAL | Age: 72
Discharge: HOME | End: 2017-09-22
Attending: PHYSICIAN ASSISTANT
Payer: MEDICARE

## 2017-09-22 DIAGNOSIS — M51.36 DDD (DEGENERATIVE DISC DISEASE), LUMBAR: ICD-10-CM

## 2017-09-22 PROCEDURE — A9585 GADOBUTROL INJECTION: HCPCS | Mod: PO | Performed by: PHYSICIAN ASSISTANT

## 2017-09-22 PROCEDURE — 72158 MRI LUMBAR SPINE W/O & W/DYE: CPT | Mod: TC,PO

## 2017-09-22 PROCEDURE — 72158 MRI LUMBAR SPINE W/O & W/DYE: CPT | Mod: 26,,, | Performed by: RADIOLOGY

## 2017-09-22 PROCEDURE — 25500020 PHARM REV CODE 255: Mod: PO | Performed by: PHYSICIAN ASSISTANT

## 2017-09-22 RX ORDER — GADOBUTROL 604.72 MG/ML
8 INJECTION INTRAVENOUS
Status: COMPLETED | OUTPATIENT
Start: 2017-09-22 | End: 2017-09-22

## 2017-09-22 RX ADMIN — GADOBUTROL 8 ML: 604.72 INJECTION INTRAVENOUS at 10:09

## 2017-09-25 ENCOUNTER — TELEPHONE (OUTPATIENT)
Dept: PAIN MEDICINE | Facility: CLINIC | Age: 72
End: 2017-09-25

## 2017-09-25 NOTE — TELEPHONE ENCOUNTER
I spoke to the patient and reviewed his new lumbar spine MRI results.  Please schedule a caudal epidural steroid injection with four-week follow-up.

## 2017-09-26 DIAGNOSIS — M54.16 LUMBAR RADICULOPATHY: Primary | ICD-10-CM

## 2017-09-26 RX ORDER — CEFAZOLIN SODIUM 2 G/50ML
2 SOLUTION INTRAVENOUS
Status: CANCELLED | OUTPATIENT
Start: 2017-09-26

## 2017-09-26 RX ORDER — ALPRAZOLAM 0.5 MG/1
1 TABLET, ORALLY DISINTEGRATING ORAL ONCE AS NEEDED
Status: CANCELLED | OUTPATIENT
Start: 2017-10-04 | End: 2017-10-04

## 2017-09-26 RX ORDER — SODIUM CHLORIDE, SODIUM LACTATE, POTASSIUM CHLORIDE, CALCIUM CHLORIDE 600; 310; 30; 20 MG/100ML; MG/100ML; MG/100ML; MG/100ML
INJECTION, SOLUTION INTRAVENOUS CONTINUOUS
Status: CANCELLED | OUTPATIENT
Start: 2017-09-26

## 2017-09-26 NOTE — TELEPHONE ENCOUNTER
Spoke with the patient and procedure scheduled for Oct 4th with a follow appointment mailed to home address.

## 2017-10-03 DIAGNOSIS — M51.36 DDD (DEGENERATIVE DISC DISEASE), LUMBAR: Primary | ICD-10-CM

## 2017-10-04 ENCOUNTER — HOSPITAL ENCOUNTER (OUTPATIENT)
Facility: HOSPITAL | Age: 72
Discharge: HOME | End: 2017-10-04
Attending: ANESTHESIOLOGY | Admitting: ANESTHESIOLOGY
Payer: MEDICARE

## 2017-10-04 ENCOUNTER — HOSPITAL ENCOUNTER (OUTPATIENT)
Dept: RADIOLOGY | Facility: HOSPITAL | Age: 72
Discharge: HOME | End: 2017-10-04
Attending: ANESTHESIOLOGY | Admitting: ANESTHESIOLOGY
Payer: MEDICARE

## 2017-10-04 ENCOUNTER — SURGERY (OUTPATIENT)
Age: 72
End: 2017-10-04

## 2017-10-04 DIAGNOSIS — M54.16 LUMBAR RADICULOPATHY: Primary | ICD-10-CM

## 2017-10-04 DIAGNOSIS — M51.36 DDD (DEGENERATIVE DISC DISEASE), LUMBAR: ICD-10-CM

## 2017-10-04 PROCEDURE — 62323 NJX INTERLAMINAR LMBR/SAC: CPT | Mod: ,,, | Performed by: ANESTHESIOLOGY

## 2017-10-04 PROCEDURE — 25000003 PHARM REV CODE 250: Mod: PO | Performed by: ANESTHESIOLOGY

## 2017-10-04 PROCEDURE — 76000 FLUOROSCOPY <1 HR PHYS/QHP: CPT | Mod: TC,PO

## 2017-10-04 PROCEDURE — A4216 STERILE WATER/SALINE, 10 ML: HCPCS | Mod: PO | Performed by: ANESTHESIOLOGY

## 2017-10-04 PROCEDURE — 25500020 PHARM REV CODE 255: Mod: PO | Performed by: ANESTHESIOLOGY

## 2017-10-04 PROCEDURE — 63600175 PHARM REV CODE 636 W HCPCS: Mod: PO | Performed by: ANESTHESIOLOGY

## 2017-10-04 PROCEDURE — 62323 NJX INTERLAMINAR LMBR/SAC: CPT | Mod: PO | Performed by: ANESTHESIOLOGY

## 2017-10-04 RX ORDER — MIDAZOLAM HYDROCHLORIDE 2 MG/2ML
INJECTION, SOLUTION INTRAMUSCULAR; INTRAVENOUS
Status: DISCONTINUED | OUTPATIENT
Start: 2017-10-04 | End: 2017-10-04 | Stop reason: HOSPADM

## 2017-10-04 RX ORDER — SODIUM CHLORIDE, SODIUM LACTATE, POTASSIUM CHLORIDE, CALCIUM CHLORIDE 600; 310; 30; 20 MG/100ML; MG/100ML; MG/100ML; MG/100ML
INJECTION, SOLUTION INTRAVENOUS CONTINUOUS
Status: DISCONTINUED | OUTPATIENT
Start: 2017-10-04 | End: 2017-10-04 | Stop reason: HOSPADM

## 2017-10-04 RX ORDER — ALPRAZOLAM 0.5 MG/1
1 TABLET, ORALLY DISINTEGRATING ORAL ONCE AS NEEDED
Status: DISCONTINUED | OUTPATIENT
Start: 2017-10-04 | End: 2017-10-04 | Stop reason: HOSPADM

## 2017-10-04 RX ORDER — SODIUM CHLORIDE 9 MG/ML
INJECTION, SOLUTION INTRAMUSCULAR; INTRAVENOUS; SUBCUTANEOUS
Status: DISCONTINUED | OUTPATIENT
Start: 2017-10-04 | End: 2017-10-04 | Stop reason: HOSPADM

## 2017-10-04 RX ORDER — LIDOCAINE HYDROCHLORIDE 10 MG/ML
INJECTION, SOLUTION EPIDURAL; INFILTRATION; INTRACAUDAL; PERINEURAL
Status: DISCONTINUED | OUTPATIENT
Start: 2017-10-04 | End: 2017-10-04 | Stop reason: HOSPADM

## 2017-10-04 RX ORDER — METHYLPREDNISOLONE ACETATE 80 MG/ML
INJECTION, SUSPENSION INTRA-ARTICULAR; INTRALESIONAL; INTRAMUSCULAR; SOFT TISSUE
Status: DISCONTINUED | OUTPATIENT
Start: 2017-10-04 | End: 2017-10-04 | Stop reason: HOSPADM

## 2017-10-04 RX ADMIN — METHYLPREDNISOLONE ACETATE 80 MG: 80 INJECTION, SUSPENSION INTRA-ARTICULAR; INTRALESIONAL; INTRAMUSCULAR; SOFT TISSUE at 04:10

## 2017-10-04 RX ADMIN — LIDOCAINE HYDROCHLORIDE 2 ML: 10 INJECTION, SOLUTION EPIDURAL; INFILTRATION; INTRACAUDAL; PERINEURAL at 04:10

## 2017-10-04 RX ADMIN — SODIUM CHLORIDE, SODIUM LACTATE, POTASSIUM CHLORIDE, AND CALCIUM CHLORIDE: 600; 310; 30; 20 INJECTION, SOLUTION INTRAVENOUS at 02:10

## 2017-10-04 RX ADMIN — MIDAZOLAM HYDROCHLORIDE 2 MG: 1 INJECTION, SOLUTION INTRAMUSCULAR; INTRAVENOUS at 04:10

## 2017-10-04 RX ADMIN — IOHEXOL 1 ML: 300 INJECTION, SOLUTION INTRAVENOUS at 04:10

## 2017-10-04 RX ADMIN — SODIUM CHLORIDE 4 ML: 9 INJECTION INTRAMUSCULAR; INTRAVENOUS; SUBCUTANEOUS at 04:10

## 2017-10-04 NOTE — DISCHARGE INSTRUCTIONS
Home care instructions  Apply ice pack to the injection site for 20 minutes periods for the first 24 hrs for soreness/discomfort at injection site DO NOT USE HEAT FOR 24 HOURS  Keep site clean and dry for 24 hours, remove bandaid when desired  Do not drive until tomorrow  Take care when walking after a lumbar injection  Avoid strenuous activities for 2 days  Make take 2 weeks to feel the full effects   Resume home medication as prescribed today  Resume Aspirin, Plavix, or Coumadin the day after the procedure unless otherwise instructed.    SEE IMMEDIATE MEDICAL HELP FOR:  Severe increase in your usual pain or appearance of new pain  Prolonged or increasing weakness or numbness in the legs or arms  Drainage, redness, active bleeding, or increased swelling at the injection site  Temperature over 100.0 degrees F.  Headache that increases when your head is upright and decreases when you lie flat    CALL 911 OR GO DIRECTLY TO EMERGENCY DEPARTMENT FOR:  Shortness of breath, chest pain, or problems breathing  Recovery After Procedural Sedation (Adult)  You have been given medicine by vein to make you sleep during your surgery. This may have included both a pain medicine and sleeping medicine. Most of the effects have worn off. But you may still have some drowsiness for the next 6 to 8 hours.  Home care  Follow these guidelines when you get home:  · For the next 8 hours, you should be watched by a responsible adult. This person should make sure your condition is not getting worse.  · Don't drink any alcohol for the next 24 hours.  · Don't drive, operate dangerous machinery, or make important business or personal decisions during the next 24 hours.  Note: Your healthcare provider may tell you not to take any medicine by mouth for pain or sleep in the next 4 hours. These medicines may react with the medicines you were given in the hospital. This could cause a much stronger response than usual.  Follow-up care  Follow up with  your healthcare provider if you are not alert and back to your usual level of activity within 12 hours.  When to seek medical advice  Call your healthcare provider right away if any of these occur:  · Drowsiness gets worse  · Weakness or dizziness gets worse  · Repeated vomiting  · You can't be awakened   Date Last Reviewed: 10/18/2016  © 8938-7220 [employer]. [address] [place] [address], [place] [address]. All rights reserved. This information is not intended as a substitute for professional medical care. Always follow your healthcare professional's instructions.

## 2017-10-04 NOTE — DISCHARGE SUMMARY
[facility]  Discharge Note  Short Stay    Admit Date: 10/4/2017    Discharge Date: 10/4/2017    Attending Physician: Josafat Kahn MD     Discharge Provider: Josafat Kahn    Diagnoses:  Active Hospital Problems    Diagnosis  POA    *Lumbar radiculopathy [M54.16]  Yes      Resolved Hospital Problems    Diagnosis Date Resolved POA   No resolved problems to display.       Discharged Condition: good    Final Diagnoses: Lumbar radiculopathy [M54.16]    Disposition: Home or Self Care    Hospital Course: no complications, uneventful    Outcome of Hospitalization, Treatment, Procedure, or Surgery:  Patient was admitted for outpatient procedure. The patient underwent procedure without complications and are discharged home    Follow up/Patient Instructions:  Follow up as scheduled/Patient has received instructions and follow up date    Medications:  Continue previous medications      Discharge Procedure Orders  Diet general     Activity as tolerated     Call MD for:  temperature >100.4     Call MD for:  severe uncontrolled pain     Call MD for:  redness, tenderness, or signs of infection (pain, swelling, redness, odor or green/yellow discharge around incision site)     Call MD for:  severe persistent headache     No dressing needed           Discharge Procedure Orders (must include Diet, Follow-up, Activity):    Discharge Procedure Orders (must include Diet, Follow-up, Activity)  Diet general     Activity as tolerated     Call MD for:  temperature >100.4     Call MD for:  severe uncontrolled pain     Call MD for:  redness, tenderness, or signs of infection (pain, swelling, redness, odor or green/yellow discharge around incision site)     Call MD for:  severe persistent headache     No dressing needed

## 2017-10-04 NOTE — OP NOTE
PROCEDURE DATE: 10/4/2017    PROCEDURE:  Caudal epidural steroid injection under fluoroscopy.    Diagnosis: Lumbar radiculopathy    Post Op diagnosis: Same    PHYSICIAN: Josafat Kahn M.D.    MEDICATIONS INJECTED:  80 mg of methylprednisone and 4 ml of sterile, preservative-free NaCl.    LOCAL ANESTHETIC GIVEN:  Lidocaine 1%, 4 ml total    SEDATION MEDICATIONS: none    ESTIMATED BLOOD LOSS:  none    COMPLICATIONS:  none    TECHNIQUE:   After the patient was placed in prone position, the patient was prepped and draped in the usual sterile fashion using ChloraPrep and sterile towels.  Appropriate anatomic landmarks were determined by identifying the sacral hiatus in the lateral fluoroscopic view.  Local anesthetic was given via a 25g 1.5 inch needle by raising a wheal and infiltrating down to the periosteum.  A 3.5 inch 22 gauge needle was introduced thru the sacral hiatus.  Omnipaque was injected to confirm placement in the appropriate area and that there was no vascular uptake.  The medication was then injected slowly.  The patient tolerated the procedure well.    The patient was monitored after the procedure.  Patient was given post procedure and discharge instructions to follow at home.  The patient was discharged in a stable condition

## 2017-10-05 VITALS
BODY MASS INDEX: 27.58 KG/M2 | TEMPERATURE: 98 F | WEIGHT: 182 LBS | OXYGEN SATURATION: 100 % | DIASTOLIC BLOOD PRESSURE: 90 MMHG | RESPIRATION RATE: 16 BRPM | SYSTOLIC BLOOD PRESSURE: 155 MMHG | HEIGHT: 68 IN | HEART RATE: 67 BPM

## 2017-10-26 ENCOUNTER — OFFICE VISIT (OUTPATIENT)
Dept: FAMILY MEDICINE | Facility: CLINIC | Age: 72
End: 2017-10-26
Payer: MEDICARE

## 2017-10-26 ENCOUNTER — TELEPHONE (OUTPATIENT)
Dept: FAMILY MEDICINE | Facility: CLINIC | Age: 72
End: 2017-10-26

## 2017-10-26 VITALS
HEIGHT: 68 IN | WEIGHT: 188.69 LBS | SYSTOLIC BLOOD PRESSURE: 130 MMHG | RESPIRATION RATE: 16 BRPM | HEART RATE: 86 BPM | DIASTOLIC BLOOD PRESSURE: 60 MMHG | BODY MASS INDEX: 28.6 KG/M2 | TEMPERATURE: 98 F

## 2017-10-26 DIAGNOSIS — M19.90 ARTHRITIS: ICD-10-CM

## 2017-10-26 DIAGNOSIS — M77.30 CALCANEAL SPUR, UNSPECIFIED LATERALITY: ICD-10-CM

## 2017-10-26 DIAGNOSIS — R30.0 DYSURIA: Primary | ICD-10-CM

## 2017-10-26 DIAGNOSIS — N41.0 ACUTE PROSTATITIS: ICD-10-CM

## 2017-10-26 LAB
BILIRUB SERPL-MCNC: NORMAL MG/DL
BLOOD URINE, POC: NORMAL
COLOR, POC UA: CLEAR
GLUCOSE UR QL STRIP: NORMAL
KETONES UR QL STRIP: NORMAL
LEUKOCYTE ESTERASE URINE, POC: NORMAL
NITRITE, POC UA: NORMAL
PH, POC UA: 6
PROTEIN, POC: NORMAL
SPECIFIC GRAVITY, POC UA: 1
UROBILINOGEN, POC UA: NORMAL

## 2017-10-26 PROCEDURE — 81001 URINALYSIS AUTO W/SCOPE: CPT | Mod: S$GLB,,, | Performed by: PHYSICIAN ASSISTANT

## 2017-10-26 PROCEDURE — 87086 URINE CULTURE/COLONY COUNT: CPT

## 2017-10-26 PROCEDURE — 99999 PR PBB SHADOW E&M-EST. PATIENT-LVL IV: CPT | Mod: PBBFAC,,, | Performed by: PHYSICIAN ASSISTANT

## 2017-10-26 PROCEDURE — 99214 OFFICE O/P EST MOD 30 MIN: CPT | Mod: 25,S$GLB,, | Performed by: PHYSICIAN ASSISTANT

## 2017-10-26 RX ORDER — DICLOFENAC SODIUM 10 MG/G
2 GEL TOPICAL 2 TIMES DAILY
Qty: 2 TUBE | Refills: 5 | Status: SHIPPED | OUTPATIENT
Start: 2017-10-26 | End: 2020-06-25

## 2017-10-26 RX ORDER — DOXYCYCLINE 100 MG/1
100 CAPSULE ORAL 2 TIMES DAILY
Qty: 40 CAPSULE | Refills: 0 | Status: SHIPPED | OUTPATIENT
Start: 2017-10-26 | End: 2017-11-15

## 2017-10-26 NOTE — PROGRESS NOTES
Subjective:       Patient ID: Luke Duff is a 72 y.o. male.    Chief Complaint: Urinary Tract Infection (UTI noticed it  yesterday. Burning when urinates,lowe back pain.Also needs refills open gel)    HPI   Pt has same sx as previous flare of prostatitis  Review of Systems   Constitutional: Negative.  Negative for activity change, appetite change, chills, diaphoresis, fatigue, fever and unexpected weight change.   HENT: Negative.    Eyes: Negative.    Respiratory: Negative.  Negative for cough and shortness of breath.    Cardiovascular: Negative.  Negative for chest pain, palpitations and leg swelling.   Gastrointestinal: Negative.    Endocrine: Negative.    Genitourinary: Positive for dysuria, frequency and urgency. Negative for flank pain and hematuria.   Musculoskeletal: Negative.    Skin: Negative.  Negative for rash.       Objective:      Physical Exam   Constitutional: He appears well-developed and well-nourished. No distress.   HENT:   Head: Normocephalic and atraumatic.   Eyes: Conjunctivae are normal. No scleral icterus.   Neck: Normal range of motion. Neck supple. No tracheal deviation present. No thyromegaly present.   Cardiovascular: Normal rate, regular rhythm, normal heart sounds and intact distal pulses.  Exam reveals no gallop and no friction rub.    No murmur heard.  Pulmonary/Chest: Effort normal and breath sounds normal. No respiratory distress. He has no wheezes. He has no rales.   Abdominal: Soft. He exhibits no distension and no mass. There is no tenderness. There is no rebound and no guarding. No hernia.   No CVA tenderness   Musculoskeletal: He exhibits no edema.   Lymphadenopathy:     He has no cervical adenopathy.   Skin: Skin is dry. No rash noted.   Vitals reviewed.      Assessment:       1. Dysuria    2. Arthritis    3. Calcaneal spur, unspecified laterality    4. Acute prostatitis        Plan:       Luke was seen today for urinary tract infection.    Diagnoses and all orders for  this visit:    Dysuria  -     Urine culture  -     POCT urinalysis, dipstick or tablet reag    Arthritis  -     diclofenac sodium (VOLTAREN) 1 % Gel; Apply 2 g topically 2 (two) times daily.    Calcaneal spur, unspecified laterality  -     diclofenac sodium (VOLTAREN) 1 % Gel; Apply 2 g topically 2 (two) times daily.    Acute prostatitis  -     doxycycline (MONODOX) 100 MG capsule; Take 1 capsule (100 mg total) by mouth 2 (two) times daily.      sitz baths  Discussed otc's

## 2017-10-26 NOTE — TELEPHONE ENCOUNTER
----- Message from Frances Olvera sent at 10/26/2017  9:01 AM CDT -----  Contact: Self  Patient is requesting same day access with Ross Mejia.  He has a possible UTI.  Please call [phone] (home).  Thanks

## 2017-10-27 LAB — BACTERIA UR CULT: NO GROWTH

## 2017-10-30 ENCOUNTER — TELEPHONE (OUTPATIENT)
Dept: FAMILY MEDICINE | Facility: CLINIC | Age: 72
End: 2017-10-30

## 2017-10-30 RX ORDER — SULFAMETHOXAZOLE AND TRIMETHOPRIM 800; 160 MG/1; MG/1
1 TABLET ORAL 2 TIMES DAILY
Qty: 20 TABLET | Refills: 0 | Status: SHIPPED | OUTPATIENT
Start: 2017-10-30 | End: 2017-11-09

## 2017-10-30 NOTE — TELEPHONE ENCOUNTER
Tried to reach pt. No answer, left msg to call back.    Contacting to inform pt about changed medications.

## 2017-10-30 NOTE — TELEPHONE ENCOUNTER
----- Message from Janae Marshall sent at 10/30/2017 11:31 AM CDT -----  Contact: self  Patient called asking for advice about Doxycyline is not agreeing with him. Patient would liek to try something else.  Please call patient at [phone]. Thanks!

## 2017-11-01 NOTE — TELEPHONE ENCOUNTER
Attempted to contact pt to inform of changed medication.    No answer; left answer to return call.

## 2017-11-02 NOTE — TELEPHONE ENCOUNTER
----- Message from Whit Estrada sent at 11/2/2017  1:04 PM CDT -----  Patient is returning office call. He does not know what it concerns. Please call him back at [phone].

## 2017-11-02 NOTE — TELEPHONE ENCOUNTER
----- Message from Hayley Sewell sent at 11/2/2017  1:30 PM CDT -----  Contact: Luke Alvarado is returning call. Please call [phone]. Thanks!

## 2017-11-03 NOTE — TELEPHONE ENCOUNTER
----- Message from Hayley Moctezuma sent at 11/2/2017  2:43 PM CDT -----  Contact: self                                attn:  Frances  Patient [phone] is returning call to Nurse Daniels from yesterday and today/please call

## 2017-11-03 NOTE — TELEPHONE ENCOUNTER
Spoke with pt, he did get the bactrim and has been taking it, he states that he is feeling much better and the bactrim is not upsetting his stomach.

## 2017-11-28 ENCOUNTER — TELEPHONE (OUTPATIENT)
Dept: INTERNAL MEDICINE | Facility: CLINIC | Age: 72
End: 2017-11-28

## 2017-11-28 NOTE — TELEPHONE ENCOUNTER
----- Message from Damari Carter sent at 11/28/2017 10:02 AM CST -----  Contact: remberto Zelaya for nurse   Call back      No other information could be obtained

## 2017-12-12 ENCOUNTER — OFFICE VISIT (OUTPATIENT)
Dept: FAMILY MEDICINE | Facility: CLINIC | Age: 72
End: 2017-12-12
Payer: MEDICARE

## 2017-12-12 ENCOUNTER — LAB VISIT (OUTPATIENT)
Dept: LAB | Facility: HOSPITAL | Age: 72
End: 2017-12-12
Attending: PHYSICIAN ASSISTANT
Payer: MEDICARE

## 2017-12-12 VITALS
HEART RATE: 81 BPM | SYSTOLIC BLOOD PRESSURE: 124 MMHG | DIASTOLIC BLOOD PRESSURE: 76 MMHG | OXYGEN SATURATION: 97 % | WEIGHT: 194 LBS | BODY MASS INDEX: 29.4 KG/M2 | HEIGHT: 68 IN

## 2017-12-12 VITALS
TEMPERATURE: 98 F | OXYGEN SATURATION: 97 % | RESPIRATION RATE: 17 BRPM | WEIGHT: 194 LBS | BODY MASS INDEX: 29.4 KG/M2 | HEIGHT: 68 IN | HEART RATE: 81 BPM | DIASTOLIC BLOOD PRESSURE: 76 MMHG | SYSTOLIC BLOOD PRESSURE: 124 MMHG

## 2017-12-12 DIAGNOSIS — R53.83 OTHER FATIGUE: ICD-10-CM

## 2017-12-12 DIAGNOSIS — I77.1 TORTUOUS AORTA: ICD-10-CM

## 2017-12-12 DIAGNOSIS — M51.37 DEGENERATION OF LUMBAR OR LUMBOSACRAL INTERVERTEBRAL DISC: ICD-10-CM

## 2017-12-12 DIAGNOSIS — N41.9 PROSTATITIS, UNSPECIFIED PROSTATITIS TYPE: ICD-10-CM

## 2017-12-12 DIAGNOSIS — E78.1 HYPERTRIGLYCERIDEMIA: ICD-10-CM

## 2017-12-12 DIAGNOSIS — R30.0 DYSURIA: ICD-10-CM

## 2017-12-12 DIAGNOSIS — H00.029 MEIBOMIANITIS, UNSPECIFIED LATERALITY: ICD-10-CM

## 2017-12-12 DIAGNOSIS — M54.16 LUMBAR RADICULOPATHY: ICD-10-CM

## 2017-12-12 DIAGNOSIS — K21.00 GASTROESOPHAGEAL REFLUX DISEASE WITH ESOPHAGITIS: ICD-10-CM

## 2017-12-12 DIAGNOSIS — I70.0 ATHEROSCLEROSIS OF AORTA: ICD-10-CM

## 2017-12-12 DIAGNOSIS — R30.0 DYSURIA: Primary | ICD-10-CM

## 2017-12-12 DIAGNOSIS — I48.91 ATRIAL FIBRILLATION WITH RAPID VENTRICULAR RESPONSE: ICD-10-CM

## 2017-12-12 DIAGNOSIS — N41.1 PROSTATITIS, CHRONIC: ICD-10-CM

## 2017-12-12 DIAGNOSIS — H26.492 POSTERIOR CAPSULAR OPACIFICATION VISUALLY SIGNIFICANT OF LEFT EYE: ICD-10-CM

## 2017-12-12 DIAGNOSIS — K21.9 GASTROESOPHAGEAL REFLUX DISEASE, ESOPHAGITIS PRESENCE NOT SPECIFIED: ICD-10-CM

## 2017-12-12 DIAGNOSIS — Z00.00 ENCOUNTER FOR PREVENTIVE HEALTH EXAMINATION: Primary | ICD-10-CM

## 2017-12-12 LAB
ALBUMIN SERPL BCP-MCNC: 3.7 G/DL
ALP SERPL-CCNC: 83 U/L
ALT SERPL W/O P-5'-P-CCNC: 13 U/L
ANION GAP SERPL CALC-SCNC: 6 MMOL/L
AST SERPL-CCNC: 16 U/L
BASOPHILS # BLD AUTO: 0.15 K/UL
BASOPHILS NFR BLD: 1.3 %
BILIRUB SERPL-MCNC: 0.4 MG/DL
BILIRUB SERPL-MCNC: NEGATIVE MG/DL
BLOOD URINE, POC: NEGATIVE
BUN SERPL-MCNC: 15 MG/DL
CALCIUM SERPL-MCNC: 9.6 MG/DL
CHLORIDE SERPL-SCNC: 104 MMOL/L
CHOLEST SERPL-MCNC: 232 MG/DL
CHOLEST/HDLC SERPL: 5.5 {RATIO}
CO2 SERPL-SCNC: 30 MMOL/L
COLOR, POC UA: NORMAL
CREAT SERPL-MCNC: 0.9 MG/DL
DIFFERENTIAL METHOD: ABNORMAL
EOSINOPHIL # BLD AUTO: 0.9 K/UL
EOSINOPHIL NFR BLD: 8.2 %
ERYTHROCYTE [DISTWIDTH] IN BLOOD BY AUTOMATED COUNT: 14.6 %
EST. GFR  (AFRICAN AMERICAN): >60 ML/MIN/1.73 M^2
EST. GFR  (NON AFRICAN AMERICAN): >60 ML/MIN/1.73 M^2
GLUCOSE SERPL-MCNC: 96 MG/DL
GLUCOSE UR QL STRIP: NORMAL
HCT VFR BLD AUTO: 40.1 %
HDLC SERPL-MCNC: 42 MG/DL
HDLC SERPL: 18.1 %
HGB BLD-MCNC: 13.6 G/DL
IMM GRANULOCYTES # BLD AUTO: 0.43 K/UL
IMM GRANULOCYTES NFR BLD AUTO: 3.8 %
KETONES UR QL STRIP: NEGATIVE
LDLC SERPL CALC-MCNC: 145 MG/DL
LEUKOCYTE ESTERASE URINE, POC: NEGATIVE
LYMPHOCYTES # BLD AUTO: 1.9 K/UL
LYMPHOCYTES NFR BLD: 17.1 %
MCH RBC QN AUTO: 29.8 PG
MCHC RBC AUTO-ENTMCNC: 33.9 G/DL
MCV RBC AUTO: 88 FL
MONOCYTES # BLD AUTO: 0.9 K/UL
MONOCYTES NFR BLD: 7.6 %
NEUTROPHILS # BLD AUTO: 7 K/UL
NEUTROPHILS NFR BLD: 62 %
NITRITE, POC UA: NEGATIVE
NONHDLC SERPL-MCNC: 190 MG/DL
NRBC BLD-RTO: 0 /100 WBC
PH, POC UA: 6
PLATELET # BLD AUTO: 221 K/UL
PMV BLD AUTO: 11.3 FL
POTASSIUM SERPL-SCNC: 4.1 MMOL/L
PROT SERPL-MCNC: 7.6 G/DL
PROTEIN, POC: NEGATIVE
RBC # BLD AUTO: 4.57 M/UL
SODIUM SERPL-SCNC: 140 MMOL/L
SPECIFIC GRAVITY, POC UA: 1.01
TRIGL SERPL-MCNC: 225 MG/DL
TSH SERPL DL<=0.005 MIU/L-ACNC: 1.57 UIU/ML
UROBILINOGEN, POC UA: NORMAL
WBC # BLD AUTO: 11.28 K/UL

## 2017-12-12 PROCEDURE — 85025 COMPLETE CBC W/AUTO DIFF WBC: CPT

## 2017-12-12 PROCEDURE — 99999 PR PBB SHADOW E&M-EST. PATIENT-LVL IV: CPT | Mod: PBBFAC,,, | Performed by: NURSE PRACTITIONER

## 2017-12-12 PROCEDURE — 99999 PR PBB SHADOW E&M-EST. PATIENT-LVL III: CPT | Mod: PBBFAC,,, | Performed by: PHYSICIAN ASSISTANT

## 2017-12-12 PROCEDURE — 84402 ASSAY OF FREE TESTOSTERONE: CPT

## 2017-12-12 PROCEDURE — G0439 PPPS, SUBSEQ VISIT: HCPCS | Mod: S$GLB,,, | Performed by: NURSE PRACTITIONER

## 2017-12-12 PROCEDURE — 80061 LIPID PANEL: CPT

## 2017-12-12 PROCEDURE — 99499 UNLISTED E&M SERVICE: CPT | Mod: S$GLB,,, | Performed by: PHYSICIAN ASSISTANT

## 2017-12-12 PROCEDURE — 99214 OFFICE O/P EST MOD 30 MIN: CPT | Mod: 25,S$GLB,, | Performed by: PHYSICIAN ASSISTANT

## 2017-12-12 PROCEDURE — 84443 ASSAY THYROID STIM HORMONE: CPT

## 2017-12-12 PROCEDURE — 80053 COMPREHEN METABOLIC PANEL: CPT

## 2017-12-12 PROCEDURE — 81001 URINALYSIS AUTO W/SCOPE: CPT | Mod: S$GLB,,, | Performed by: PHYSICIAN ASSISTANT

## 2017-12-12 PROCEDURE — 99499 UNLISTED E&M SERVICE: CPT | Mod: S$GLB,,, | Performed by: NURSE PRACTITIONER

## 2017-12-12 RX ORDER — CIPROFLOXACIN 250 MG/1
250 TABLET, FILM COATED ORAL 2 TIMES DAILY
Qty: 40 TABLET | Refills: 0 | Status: SHIPPED | OUTPATIENT
Start: 2017-12-12 | End: 2018-01-01

## 2017-12-12 NOTE — PROGRESS NOTES
"Luke Duff presented for a  Medicare AWV and comprehensive Health Risk Assessment today. The following components were reviewed and updated:    · Medical history  · Family History  · Social history  · Allergies and Current Medications  · Health Risk Assessment  · Health Maintenance  · Care Team     Review of Systems   Constitutional: Negative for chills, fever, malaise/fatigue and weight loss.   Respiratory: Negative for cough and shortness of breath.    Cardiovascular: Negative for chest pain.   Gastrointestinal: Negative for abdominal pain, blood in stool, constipation, diarrhea, nausea and vomiting.   Skin: Negative for rash.   Neurological: Negative for dizziness and headaches.     ** See Completed Assessments for Annual Wellness Visit within the encounter summary.**     The following assessments were completed:  · Living Situation  · CAGE  · Depression Screening  · Timed Get Up and Go  · Whisper Test  · Cognitive Function Screening      · Nutrition Screening  · ADL Screening  · PAQ Screening    Vitals:    12/12/17 1117   BP: 124/76   BP Location: Right arm   Patient Position: Sitting   BP Method: X-Large (Manual)   Pulse: 81   SpO2: 97%   Weight: 88 kg (194 lb 0.1 oz)   Height: 5' 8" (1.727 m)     Body mass index is 29.5 kg/m².  Physical Exam   Constitutional: He is oriented to person, place, and time. He appears well-nourished.   Cardiovascular: Normal rate, regular rhythm, normal heart sounds and intact distal pulses.    Pulmonary/Chest: Effort normal and breath sounds normal.   Neurological: He is alert and oriented to person, place, and time.   Skin: Skin is dry. No rash noted.   Vitals reviewed.        Diagnoses and health risks identified today and associated recommendations/orders:    1. Encounter for preventive health examination  Reviewed and discussed health maintenance.    Declined immunizations today  - Ambulatory referral to Ophthalmology    2. Meibomianitis, unspecified laterality  Continue " ophthalmology follow up    3. Posterior capsular opacification visually significant of left eye  Continue ophthalmology follow up    4. Degeneration of lumbar or lumbosacral intervertebral disc  Stable- continue current treatment and follow up routinely with PCP     5. Lumbar radiculopathy  Stable- continue current treatment and follow up routinely with PCP     6. Atrial fibrillation with rapid ventricular response  Stable- continue current treatment and follow up routinely with PCP.   Stress test and echo reviewed.   Cardiology prn    7. Hypertriglyceridemia  Stable- continue current treatment and follow up routinely with PCP.   Stress test and echo reviewed.   Cardiology prn    8. Prostatitis, chronic  cipro today. Follow up prn    9. Gastroesophageal reflux disease, esophagitis presence not specified  Stable- continue current treatment and follow up routinely with PCP     10. 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 Recommendations were developed using the USPSTF age appropriate recommendations. Education, counseling, and referrals were provided as needed. After Visit Summary printed and given to patient which includes a list of additional screenings\tests needed.    Susan Vital NP

## 2017-12-12 NOTE — PROGRESS NOTES
Subjective:       Patient ID: Luke Duff is a 72 y.o. male.    Chief Complaint: Gastroesophageal Reflux and Dysuria    HPI   Pt has have increased GERD sx  Notes that taking antibiotic recently caused constipation  Dysuria and prostate sx have returned  Pt also request update on labs  Review of Systems   Constitutional: Negative.  Negative for activity change, appetite change, chills, diaphoresis, fatigue, fever and unexpected weight change.   HENT: Negative.    Eyes: Negative.    Respiratory: Negative.  Negative for cough.    Cardiovascular: Negative.  Negative for chest pain and leg swelling.   Gastrointestinal: Negative.    Endocrine: Negative.    Genitourinary: Positive for dysuria. Negative for flank pain, frequency, hematuria, scrotal swelling, testicular pain and urgency.   Musculoskeletal: Negative.    Skin: Negative.  Negative for rash.       Objective:      Physical Exam   Constitutional: He appears well-developed and well-nourished. No distress.   HENT:   Head: Normocephalic and atraumatic.   Right Ear: External ear normal.   Left Ear: External ear normal.   Nose: Nose normal.   Mouth/Throat: Oropharynx is clear and moist. No oropharyngeal exudate.   Eyes: Conjunctivae are normal. No scleral icterus.   Neck: Normal range of motion. Neck supple. No tracheal deviation present. No thyromegaly present.   Cardiovascular: Normal rate, regular rhythm, normal heart sounds and intact distal pulses.  Exam reveals no gallop and no friction rub.    No murmur heard.  Pulmonary/Chest: Effort normal and breath sounds normal. No respiratory distress. He has no wheezes. He has no rales.   Abdominal: Soft. Bowel sounds are normal. He exhibits no distension and no mass. There is no tenderness. There is no rebound and no guarding. No hernia.   Musculoskeletal: He exhibits no edema.   Lymphadenopathy:     He has no cervical adenopathy.   Skin: Skin is warm and dry. No rash noted.   Vitals reviewed.      Assessment:        1. Dysuria    2. Gastroesophageal reflux disease with esophagitis    3. Prostatitis, unspecified prostatitis type    4. Hypertriglyceridemia    5. Other fatigue        Plan:       Luke was seen today for gastroesophageal reflux and dysuria.    Diagnoses and all orders for this visit:    Dysuria  -     CBC auto differential; Future  -     Comprehensive metabolic panel; Future  -     TSH; Future  -     Lipid panel; Future  -     POCT urinalysis, dipstick or tablet reag  -     Testosterone, free; Future    Gastroesophageal reflux disease with esophagitis  -     CBC auto differential; Future  -     Comprehensive metabolic panel; Future  -     TSH; Future  -     Lipid panel; Future  -     POCT urinalysis, dipstick or tablet reag  -     Testosterone, free; Future    Prostatitis, unspecified prostatitis type  -     CBC auto differential; Future  -     Comprehensive metabolic panel; Future  -     TSH; Future  -     Lipid panel; Future  -     POCT urinalysis, dipstick or tablet reag  -     Testosterone, free; Future  -     ciprofloxacin HCl (CIPRO) 250 MG tablet; Take 1 tablet (250 mg total) by mouth 2 (two) times daily.    Hypertriglyceridemia  -     CBC auto differential; Future  -     Comprehensive metabolic panel; Future  -     TSH; Future  -     Lipid panel; Future  -     POCT urinalysis, dipstick or tablet reag  -     Testosterone, free; Future    Other fatigue  -     CBC auto differential; Future  -     Comprehensive metabolic panel; Future  -     TSH; Future  -     Lipid panel; Future  -     POCT urinalysis, dipstick or tablet reag  -     Testosterone, free; Future    Other orders  -     Cancel: Testosterone, free; Future  -     Cancel: Testosterone, Total, Males; Future    discussed otc's  Sitz baths  Urine dip appears normal

## 2017-12-12 NOTE — PATIENT INSTRUCTIONS
Counseling and Referral of Other Preventative  (Italic type indicates deductible and co-insurance are waived)    Patient Name: Luke Duff  Today's Date: 12/12/2017      SERVICE LIMITATIONS RECOMMENDATION    Vaccines    · Pneumococcal (once after 65)    · Influenza (annually)    · Hepatitis B (if medium/high risk)    · Prevnar 13      Hepatitis B medium/high risk factors:       - End-stage renal disease       - Hemophiliacs who received Factor VII or         IX concentrates       - Clients of institutions for the mentally             retarded       - Persons who live in the same house as          a HepB carrier       - Homosexual men       - Illicit injectable drug abusers     Pneumococcal: Recommended to patient     Influenza: Recommended to patient, declined     Hepatitis B: N/A     Prevnar 13: Recommended to patient      Prostate cancer screening (annually to age 75)     Prostate specific antigen (PSA) Shared decision making with Provider. Sometimes a co-pay may be required if the patient decides to have this test. The USPSTF no longer recommends prostate cancer screening routinely in medicine: not to follow    Colorectal cancer screening (to age 75)    · Fecal occult blood test (annual)  · Flexible sigmoidoscopy (5y)  · Screening colonoscopy (10y)  · Barium enema   Last done 2009, recommend to repeat every 10  years (2019)    Diabetes self-management training (no USPSTF recommendations)  Requires referral by treating physician for patient with diabetes or renal disease. 10 hours of initial DSMT sessions of no less than 30 minutes each in a continuous 12-month period. 2 hours of follow-up DSMT in subsequent years.  N/A    Glaucoma screening (no USPSTF recommendation)  Diabetes mellitus, family history   , age 50 or over    American, age 65 or over  Recommend follow up with eye care professional regularly    Medical nutrition therapy for diabetes or renal disease (no recommended  schedule)  Requires referral by treating physician for patient with diabetes or renal disease or kidney transplant within the past 3 years.  Can be provided in same year as diabetes self-management training (DSMT), and CMS recommends medical nutrition therapy take place after DSMT. Up to 3 hours for initial year and 2 hours in subsequent years.  N/A    Cardiovascular screening blood tests (every 5 years)  · Fasting lipid panel  Order as a panel if possible  Scheduled, see appointments    Diabetes screening tests (at least every 3 years, Medicare covers annually or at 6-month intervals for prediabetic patients)  · Fasting blood sugar (FBS) or glucose tolerance test (GTT)  Patient must be diagnosed with one of the following:       - Hypertension       - Dyslipidemia       - Obesity (BMI 30kg/m2)       - Previous elevated impaired FBS or GTT       ... or any two of the following:       - Overweight (BMI 25 but <30)       - Family history of diabetes       - Age 65 or older       - History of gestational diabetes or birth of baby weighing more than 9 pounds  Scheduled, see appointments    Abdominal aortic aneurysm screening (once)  · Sonogram   Limited to patients who meet one of the following criteria:       - Men who are 65-75 years old and have smoked more than 100 cigarette in their lifetime       - Anyone with a family history of abdominal aortic aneurysm       - Anyone recommended for screening by the USPSTF  N/A    HIV screening (annually for increased risk patients)  · HIV-1 and HIV-2 by EIA, or LIZZY, rapid antibody test or oral mucosa transudate  Patients must be at increased risk for HIV infection per USPSTF guidelines or pregnant. Tests covered annually for patient at increased risk or as requested by the patient. Pregnant patients may receive up to 3 tests during pregnancy.  Risks discussed, screening is not recommended    Smoking cessation counseling (up to 8 sessions per year)  Patients must be  asymptomatic of tobacco-related conditions to receive as a preventative service.  Non-smoker    Subsequent annual wellness visit  At least 12 months since last AWV  Return in one year     The following information is provided to all patients.  This information is to help you find resources for any of the problems found today that may be affecting your health:                Living healthy guide: www.[facility].[place].[facility]      Understanding Diabetes: www.diabetes.org      Eating healthy: www.cdc.gov/healthyweight      CDC home safety checklist: www.cdc.gov/steadi/patient.html      Agency on Aging: www.goea.[place].[facility]      Alcoholics anonymous (AA): www.aa.org      Physical Activity: www.shahbaz.nih.gov/tt7vnoe      Tobacco use: www.quitwithusla.org

## 2017-12-14 LAB — TESTOST FREE SERPL-MCNC: 5.9 PG/ML

## 2017-12-20 ENCOUNTER — TELEPHONE (OUTPATIENT)
Dept: INTERNAL MEDICINE | Facility: CLINIC | Age: 72
End: 2017-12-20

## 2017-12-20 DIAGNOSIS — D72.828 GRANULOCYTOSIS: Primary | ICD-10-CM

## 2017-12-20 NOTE — TELEPHONE ENCOUNTER
Please call pt and let him know that his CBC has changes in his white blood cells  I think this should be checked by hematology, so I have ordered a consult   Please let him know the other labs results and send him a copy  We can discuss this all his next visit

## 2017-12-20 NOTE — TELEPHONE ENCOUNTER
Dwaine  Can you put in new referral for Hematology. Apparently when pt was called buy them he did not have the results yet and declined the appt so the referral was closed. I have spoken with pt today and he agrees to go.

## 2018-01-09 ENCOUNTER — INITIAL CONSULT (OUTPATIENT)
Dept: HEMATOLOGY/ONCOLOGY | Facility: CLINIC | Age: 73
End: 2018-01-09
Payer: MEDICARE

## 2018-01-09 VITALS
TEMPERATURE: 98 F | HEART RATE: 75 BPM | BODY MASS INDEX: 29.37 KG/M2 | SYSTOLIC BLOOD PRESSURE: 126 MMHG | DIASTOLIC BLOOD PRESSURE: 65 MMHG | RESPIRATION RATE: 16 BRPM | WEIGHT: 193.81 LBS | HEIGHT: 68 IN

## 2018-01-09 DIAGNOSIS — D72.829 LEUKOCYTOSIS, UNSPECIFIED TYPE: Primary | ICD-10-CM

## 2018-01-09 PROCEDURE — 99204 OFFICE O/P NEW MOD 45 MIN: CPT | Mod: S$GLB,,, | Performed by: INTERNAL MEDICINE

## 2018-01-09 PROCEDURE — 99999 PR PBB SHADOW E&M-EST. PATIENT-LVL III: CPT | Mod: PBBFAC,,, | Performed by: INTERNAL MEDICINE

## 2018-01-09 NOTE — LETTER
January 9, 2018        Ross Mejia PA-C  [address]  [place] [address]             [facility] - Hematology  [facility] [facility] [address]  [facility] [address]  Phone: [phone]  Fax: [phone]   Patient: Luke Duff   MR Number: [id number]   YOB: 1945   Date of Visit: 1/9/2018       Dear Dr. Mejia:    Thank you for referring Luke Duff to me for evaluation of chronic leukocytosis. Below are the relevant portions of my assessment and plan of care. If you have questions, please do not hesitate to call me. I look forward to following Luke along with you.    Sincerely,      MD DYLAN Patel MD Wendy Jean Grim [place], MD H. Reiss Plauche, MD

## 2018-01-09 NOTE — Clinical Note
January 9, 2018      Ross Mejia PA-C  [address]  [place] [address]           [facility]  [address]  [facility] [address]  Phone: [phone]  Fax: [phone]          Patient: Luke Duff   MR Number: [id number]   YOB: 1945   Date of Visit: 1/9/2018       Dear Ross Mejia:    Thank you for referring Luke Duff to me for evaluation. Attached you will find relevant portions of my assessment and plan of care.    If you have questions, please do not hesitate to call me. I look forward to following Luke Duff along with you.    Sincerely,    Raheel De La Cruz MD    Enclosure  CC:  No Recipients    If you would like to receive this communication electronically, please contact [email or web address] or [phone] to request more information on [employer] Link access.    For providers and/or their staff who would like to refer a patient to [facility], please contact us through our one-stop-shop provider referral line, [facility], at [phone].    If you feel you have received this communication in error or would no longer like to receive these types of communications, please e-mail [email or web address]

## 2018-01-10 NOTE — PROGRESS NOTES
Date of Service: 01/09/2018  CHIEF COMPLAINT:  Elevated white blood cell count.    HISTORY OF PRESENT ILLNESS:  The patient is a 72-year-old white gentleman who   presents in consultation from Ross Mejia's office regarding a persistently   elevated white blood cell count with a left shift.  This has been present at   least intermittently since 2005.  The patient reports prior history of   periodontal disease, frequent sinusitis, and prostatitis.  He was recently   treated for the latter with antibiotics.  He is not experiencing painful   adenopathy, weight loss or bed drenching night sweats.  No other complaints or   pertinent findings on a 14-point review of systems.    PAST MEDICAL HISTORY:  1.  Periodontal disease.  2.  Chronic prostatitis.  3.  Chronic maxillary sinusitis.  4.  DJD.  5.  Hypertriglyceridemia.  6.  History of resected nonmelanomatous skin cancer.  7.  GERD.  8.  Atrial fibrillation with rapid ventricular response.  9.  Atherosclerotic changes of the aorta.    ALLERGIES:  Norco, Percocet, and codeine.    CURRENT MEDICATIONS:  1.  Fioricet one tablet every 6 hours p.r.n. headache.  2.  Voltaren gel 1% 2 g topically twice daily as needed.  3.  Prilosec 20 mg daily.    FAMILY AND SOCIAL HISTORY:  Former smoker, nonalcohol user.  No known family   history of blood dyscrasia.    PHYSICAL EXAMINATION:  GENERAL:  Well-developed, well-nourished elderly white gentleman in no acute   distress.  VITAL SIGNS:  Weight 193.5 pounds.  HEENT:  Normocephalic, atraumatic.  Oral mucosa pink and moist.  Lips without   lesions.  Tongue midline.  Oropharynx clear.  Nonicteric sclerae.   NECK:  Supple, no adenopathy.  No carotid bruits, thyromegaly or thyroid nodule.                                                                        HEART:  Regular rate and rhythm without murmur, gallop or rub.                LUNGS:  Clear to auscultation bilaterally.  Normal respiratory effort.       ABDOMEN:  Soft, nontender,  nondistended with positive normoactive bowel sounds,   no hepatosplenomegaly.    EXTREMITIES:  No cyanosis, clubbing or edema.  Distal pulses are intact.                                              AXILLAE AND GROIN:  No palpable pathologic lymphadenopathy is appreciated.        SKIN:  Intact/turgor normal                                                              NEUROLOGIC:  Cranial nerves II-XII grossly intact.  Motor:  Good muscle bulk and   tone.  Strength/sensory 5/5 throughout.  Gait stable.     LABORATORY:  Laboratory from 12/12/2017 white count 11.3, H and H 13.6 and 40.1,   MCV of 88, platelets 221.  Differential is remarkable for 62% granulocytes, 17%   lymphocytes and 8% monocytes as well as 8% eosinophils.  ANC is 7000.  Sodium   140, potassium 4.1, chloride 104, CO2 30, BUN 15, creatinine 0.9, glucose 96,   calcium 9.6.  Liver function tests are within normal limits.  GFR is greater   than 60.    IMPRESSION:  Persistent leukocytosis, likely a reaction to the patient's chronic   infectious illnesses.    PLAN:  1.  Obtain CBC with pathologist review of peripheral smear, CMP, LDH, JAK2   kinase mutation, peripheral blood for flow cytometry, and FISH for BCR-ABL.  2.  Return to clinic to review results at earliest convenience.      JUAN ALBERTO/HN  dd: 01/09/2018 17:49:38 (CST)  td: 01/10/2018 06:12:22 (CST)  Doc ID   #[id number]  Job ID #[id number]    CC:

## 2018-01-30 ENCOUNTER — OFFICE VISIT (OUTPATIENT)
Dept: HEMATOLOGY/ONCOLOGY | Facility: CLINIC | Age: 73
End: 2018-01-30
Payer: MEDICARE

## 2018-01-30 ENCOUNTER — OFFICE VISIT (OUTPATIENT)
Dept: OPHTHALMOLOGY | Facility: CLINIC | Age: 73
End: 2018-01-30
Payer: MEDICARE

## 2018-01-30 VITALS
HEIGHT: 68 IN | TEMPERATURE: 99 F | OXYGEN SATURATION: 98 % | HEART RATE: 72 BPM | SYSTOLIC BLOOD PRESSURE: 171 MMHG | DIASTOLIC BLOOD PRESSURE: 82 MMHG | RESPIRATION RATE: 18 BRPM | BODY MASS INDEX: 28.77 KG/M2 | WEIGHT: 189.81 LBS

## 2018-01-30 DIAGNOSIS — H52.7 REFRACTIVE ERROR: ICD-10-CM

## 2018-01-30 DIAGNOSIS — Z96.1 PSEUDOPHAKIA OF BOTH EYES: ICD-10-CM

## 2018-01-30 DIAGNOSIS — H02.88A MEIBOMIAN GLAND DYSFUNCTION (MGD) OF UPPER AND LOWER LIDS OF BOTH EYES: Primary | ICD-10-CM

## 2018-01-30 DIAGNOSIS — H02.88B MEIBOMIAN GLAND DYSFUNCTION (MGD) OF UPPER AND LOWER LIDS OF BOTH EYES: Primary | ICD-10-CM

## 2018-01-30 DIAGNOSIS — D72.829 LEUKOCYTOSIS, UNSPECIFIED TYPE: Primary | ICD-10-CM

## 2018-01-30 PROCEDURE — 99213 OFFICE O/P EST LOW 20 MIN: CPT | Mod: S$GLB,,, | Performed by: INTERNAL MEDICINE

## 2018-01-30 PROCEDURE — 92014 COMPRE OPH EXAM EST PT 1/>: CPT | Mod: S$GLB,,, | Performed by: OPHTHALMOLOGY

## 2018-01-30 PROCEDURE — 99999 PR PBB SHADOW E&M-EST. PATIENT-LVL III: CPT | Mod: PBBFAC,,, | Performed by: OPHTHALMOLOGY

## 2018-01-30 PROCEDURE — 99999 PR PBB SHADOW E&M-EST. PATIENT-LVL III: CPT | Mod: PBBFAC,,, | Performed by: INTERNAL MEDICINE

## 2018-01-30 NOTE — PROGRESS NOTES
HPI     Dry Eye    Additional comments: dry OU uses atears with bita in the PM//           Eye Problem    Additional comments: Fog over OS>OD  x 4 months//           Comments   dry OU uses atears with bita in the PM//  Fog over OS>OD  x 4 months//  No flashes, pos for floaters//  Not sure if   blurred va//    States cloudiness was off and on, now seems to be daily. Using bita QHS,   gtts in am x1, maybe more often when he thinks about it.        Last edited by Jazz Abraham MD on 1/30/2018  9:32 AM.   (History)        ROS     Positive for: Gastrointestinal (GERD), Neurological (radiculopathy),   Musculoskeletal (knee replacement), Eyes (pseudophakia OU; YAG capsulotomy   OS Dr. Greenberg 1/13/16)    Negative for: Endocrine, Cardiovascular    Last edited by Jazz Abraham MD on 1/30/2018  9:32 AM.   (History)        Assessment /Plan     For exam results, see Encounter Report.    Meibomian gland dysfunction (MGD) of upper and lower lids of both eyes    Pseudophakia of both eyes    Refractive error                Meibomian glands expressed today. Continue Warm compresses, lid hygiene, artificial tears.    States he cannot tolerate PO Flaxseed oil    RTC to drain concretions if symptoms not improved with above plus increasing frequency of artificial tears.     MRx stable

## 2018-01-30 NOTE — LETTER
January 30, 2018      Susan Vital NP  [address] [facility] [address] [place] [address]           [facility] - Ophthalmology  [address] [facility] [address] [place] [address]  Phone: [phone]  Fax: [phone]          Patient: Luke Duff   MR Number: [id number]   YOB: 1945   Date of Visit: 1/30/2018       Dear Susan Vital:    Thank you for referring Luke Duff to me for evaluation. Attached you will find relevant portions of my assessment and plan of care.    If you have questions, please do not hesitate to call me. I look forward to following Luke Duff along with you.    Sincerely,    Jazz Abraham MD    Enclosure  CC:  No Recipients    If you would like to receive this communication electronically, please contact [email or web address] or [phone] to request more information on [employer] Link access.    For providers and/or their staff who would like to refer a patient to [facility], please contact us through our one-stop-shop provider referral line, [facility], at [phone].    If you feel you have received this communication in error or would no longer like to receive these types of communications, please e-mail [email or web address]

## 2018-01-31 NOTE — PROGRESS NOTES
Date of Service: 01/30/2018  The patient is a 72-year-old white gentleman known to me from recent office   consultation regarding persistent leukocytosis.  He returns to review results of   interval workup.  No new complaint.  No new pertinent finding on a 10-point   review of systems.    PHYSICAL EXAMINATION:  Unchanged from that performed in the office on   01/09/2018.    LABORATORY:  Repeat CBC reveals normalization of the patient's white count at   10.6, H and H is 13.5 and 41.4, platelets 212.  Differential is unremarkable   with the exception of mild elevation of eosinophils.  Sodium was 143, potassium   4.1, chloride 105, CO2 28, BUN 15, creatinine 0.8, glucose 89, calcium 9.4.    Liver function tests are within normal limits.  GFR greater than 60.  .    Pathologist review of the peripheral smear was within normal limits.  FISH for   BCR/ABL is negative.  ANGEL-2 kinase mutation is negative.  Flow cytometric   analysis is negative for clonal population of cells.    IMPRESSION:  1.  Reactive leukocytosis, likely related to the patient's ongoing difficulties   with periodontal disease, prostatitis, maxillary sinusitis.    PLAN:  1.  No additional hematologic workup.  2.  P.r.n. clinic followup.      JUAN ALBERTO/HN  dd: 01/30/2018 11:52:12 (CST)  td: 01/31/2018 03:14:56 (CST)  Doc ID   #[id number]  Job ID #[id number]    CC:

## 2018-02-07 ENCOUNTER — TELEPHONE (OUTPATIENT)
Dept: OPHTHALMOLOGY | Facility: CLINIC | Age: 73
End: 2018-02-07

## 2018-02-07 RX ORDER — NEOMYCIN SULFATE, POLYMYXIN B SULFATE, AND DEXAMETHASONE 3.5; 10000; 1 MG/G; [USP'U]/G; MG/G
OINTMENT OPHTHALMIC
Refills: 0 | OUTPATIENT
Start: 2018-02-07

## 2018-02-08 ENCOUNTER — TELEPHONE (OUTPATIENT)
Dept: OPHTHALMOLOGY | Facility: CLINIC | Age: 73
End: 2018-02-08

## 2018-07-10 ENCOUNTER — PES CALL (OUTPATIENT)
Dept: ADMINISTRATIVE | Facility: CLINIC | Age: 73
End: 2018-07-10

## 2018-07-31 ENCOUNTER — OFFICE VISIT (OUTPATIENT)
Dept: FAMILY MEDICINE | Facility: CLINIC | Age: 73
End: 2018-07-31
Payer: MEDICARE

## 2018-07-31 VITALS
HEART RATE: 81 BPM | HEIGHT: 68 IN | DIASTOLIC BLOOD PRESSURE: 86 MMHG | OXYGEN SATURATION: 96 % | WEIGHT: 193.13 LBS | SYSTOLIC BLOOD PRESSURE: 142 MMHG | BODY MASS INDEX: 29.27 KG/M2

## 2018-07-31 DIAGNOSIS — M47.819 FACET ARTHROPATHY: ICD-10-CM

## 2018-07-31 DIAGNOSIS — Z00.00 ENCOUNTER FOR PREVENTIVE HEALTH EXAMINATION: Primary | ICD-10-CM

## 2018-07-31 DIAGNOSIS — E78.1 HYPERTRIGLYCERIDEMIA: ICD-10-CM

## 2018-07-31 DIAGNOSIS — M47.816 LUMBAR FACET ARTHROPATHY: ICD-10-CM

## 2018-07-31 DIAGNOSIS — I70.0 ATHEROSCLEROSIS OF AORTA: ICD-10-CM

## 2018-07-31 DIAGNOSIS — Z86.79 HISTORY OF ATRIAL FIBRILLATION: ICD-10-CM

## 2018-07-31 DIAGNOSIS — R09.89 CHRONIC SINUS COMPLAINTS: ICD-10-CM

## 2018-07-31 DIAGNOSIS — I77.1 TORTUOUS AORTA: ICD-10-CM

## 2018-07-31 PROCEDURE — 99999 PR PBB SHADOW E&M-EST. PATIENT-LVL V: CPT | Mod: PBBFAC,,, | Performed by: NURSE PRACTITIONER

## 2018-07-31 PROCEDURE — 99499 UNLISTED E&M SERVICE: CPT | Mod: HCNC,S$GLB,, | Performed by: NURSE PRACTITIONER

## 2018-07-31 PROCEDURE — G0439 PPPS, SUBSEQ VISIT: HCPCS | Mod: S$GLB,,, | Performed by: NURSE PRACTITIONER

## 2018-07-31 NOTE — PROGRESS NOTES
"Luke Duff presented for a  Medicare AWV and comprehensive Health Risk Assessment today. The following components were reviewed and updated:    · Medical history  · Family History  · Social history  · Allergies and Current Medications  · Health Risk Assessment  · Health Maintenance  · Care Team     Review of Systems   Constitutional: Negative for chills, fever, malaise/fatigue and weight loss.   HENT: Positive for congestion and sinus pain.    Respiratory: Negative for cough, shortness of breath and wheezing.    Cardiovascular: Negative for chest pain, leg swelling and PND.   Gastrointestinal: Negative for abdominal pain, constipation, diarrhea, nausea and vomiting.   Skin: Negative for rash.   Neurological: Positive for headaches. Negative for dizziness and weakness.     ** See Completed Assessments for Annual Wellness Visit within the encounter summary.**     The following assessments were completed:  · Living Situation  · CAGE  · Depression Screening  · Timed Get Up and Go  · Whisper Test  · Cognitive Function Screening      · Nutrition Screening  · ADL Screening  · PAQ Screening    Vitals:    07/31/18 0851   BP: (!) 142/86   BP Location: Left arm   Patient Position: Sitting   BP Method: Medium (Manual)   Pulse: 81   SpO2: 96%   Weight: 87.6 kg (193 lb 2 oz)   Height: 5' 8" (1.727 m)     Body mass index is 29.36 kg/m².  Physical Exam   Constitutional: He is oriented to person, place, and time. He appears well-nourished.   Cardiovascular: Normal rate, regular rhythm, normal heart sounds and intact distal pulses.    Pulmonary/Chest: Effort normal and breath sounds normal.   Neurological: He is alert and oriented to person, place, and time.   Skin: Skin is warm and dry.   Vitals reviewed.        Diagnoses and health risks identified today and associated recommendations/orders:    1. Encounter for preventive health examination  Reviewed and discussed health maintenance.    Declines immunizations    2. Tortuous " aorta  Stable- continue current treatment and follow up routinely with PCP     3. Atherosclerosis of aorta  Stable- continue current treatment and follow up routinely with PCP     4. Atrial fibrillation with rapid ventricular response  RESOLVED    5. Hypertriglyceridemia  Stable- continue current treatment and follow up routinely with PCP   Labs reviewed 12/2017    6. Lumbar facet arthropathy  Stable- continue current treatment and follow up routinely with PCP and pain managment    7. Facet arthropathy  Stable- continue current treatment and follow up routinely with PCP and pain managment    8. Chronic sinus complaints  - Ambulatory referral to ENT    I offered to discuss end of life issues, including information on how to make advance directives that the patient could use to name someone who would make medical decisions on their behalf if they became too ill to make themselves.    ___Patient declined  _X_Patient is interested, I provided paper work and offered to discuss.    Provided Luke with a 5-10 year written screening schedule and personal prevention plan. Recommendations were developed using the USPSTF age appropriate recommendations. Education, counseling, and referrals were provided as needed. After Visit Summary printed and given to patient which includes a list of additional screenings\tests needed.    Susan Vital, ERIK

## 2018-07-31 NOTE — PATIENT INSTRUCTIONS
Counseling and Referral of Other Preventative  (Italic type indicates deductible and co-insurance are waived)    Patient Name: Luke Duff  Today's Date: 7/31/2018    Health Maintenance       Date Due Completion Date    High Dose Statin 07/12/1965 ---    Pneumococcal (65+) (1 of 2 - PCV13) 07/12/2009 ---    Influenza Vaccine 08/01/2018 12/12/2017 (Declined)    Override on 12/12/2017: Declined    Lipid Panel 12/12/2018 12/12/2017    Colonoscopy 08/06/2019 8/6/2009    TETANUS VACCINE 12/12/2027 12/12/2017 (Declined)    Override on 12/12/2017: Declined        No orders of the defined types were placed in this encounter.    The following information is provided to all patients.  This information is to help you find resources for any of the problems found today that may be affecting your health:                Living healthy guide: www.[facility].[place].gov      Understanding Diabetes: www.diabetes.org      Eating healthy: www.cdc.gov/healthyweight      [facility] home safety checklist: www.cdc.gov/steadi/patient.html      Agency on Aging: www.goea.[place].[facility]      Alcoholics anonymous (AA): www.aa.org      Physical Activity: www.shahbaz.nih.gov/ot1esjx      Tobacco use: www.quitwithusla.org

## 2018-08-13 ENCOUNTER — OFFICE VISIT (OUTPATIENT)
Dept: OTOLARYNGOLOGY | Facility: CLINIC | Age: 73
End: 2018-08-13
Payer: MEDICARE

## 2018-08-13 VITALS — HEIGHT: 68 IN | WEIGHT: 189.63 LBS | BODY MASS INDEX: 28.74 KG/M2

## 2018-08-13 DIAGNOSIS — K21.00 GASTROESOPHAGEAL REFLUX DISEASE WITH ESOPHAGITIS: ICD-10-CM

## 2018-08-13 DIAGNOSIS — K21.9 LARYNGOPHARYNGEAL REFLUX (LPR): Primary | ICD-10-CM

## 2018-08-13 PROCEDURE — 99214 OFFICE O/P EST MOD 30 MIN: CPT | Mod: S$GLB,,, | Performed by: OTOLARYNGOLOGY

## 2018-08-13 PROCEDURE — 99999 PR PBB SHADOW E&M-EST. PATIENT-LVL III: CPT | Mod: PBBFAC,,, | Performed by: OTOLARYNGOLOGY

## 2018-08-13 RX ORDER — OMEPRAZOLE 40 MG/1
40 CAPSULE, DELAYED RELEASE ORAL DAILY
Qty: 30 CAPSULE | Refills: 11 | Status: SHIPPED | OUTPATIENT
Start: 2018-08-13 | End: 2019-06-12 | Stop reason: SDUPTHER

## 2018-08-13 NOTE — LETTER
August 13, 2018      Susan Vital, ERIK  [address] [facility] [address] [place] [address]           [facility]  [address] [facility] [address] [place] [address]  Phone: [phone]  Fax: [phone]          Patient: Luke Duff   MR Number: [id number]   YOB: 1945   Date of Visit: 8/13/2018       Dear Susan Vital:    Thank you for referring Luke Duff to me for evaluation. Attached you will find relevant portions of my assessment and plan of care.    If you have questions, please do not hesitate to call me. I look forward to following Luke Duff along with you.    Sincerely,    Lc Dougherty MD    Enclosure  CC:  No Recipients    If you would like to receive this communication electronically, please contact [email or web address] or [phone] to request more information on [employer] access.    For providers and/or their staff who would like to refer a patient to [facility], please contact us through our one-stop-shop provider referral line, [facility], at [phone].    If you feel you have received this communication in error or would no longer like to receive these types of communications, please e-mail [email or web address]

## 2018-08-13 NOTE — PROGRESS NOTES
Subjective:       Patient ID: Luke Duff is a 73 y.o. male.    Chief Complaint: Sinus Problem    Luke is here for sinus/nasal complaints. Symptoms have been present for 3 weeks recently, . His most recent episode has lasted 3 weeks. He has had some nausea recently and headache which he attributes to possible sinus issues. His main complaint is nausea, throat issues and headache..  Post-nasal drainage: yes - intermittent.   Pressure: yes, frontal and over eyes - he feels vision changes intermittently. He also endorses frontal and occipital headache.   Congestion: yes  Decreased sense of smell: yes, has been slowly increasing  Therapies tried: Mucinex D. Has not tried other therapy recently. Has been on Flonase in the past and reports no improvement.   Seasonal variation: worse in summer.    Allergy testing: yes - reportedly negative  History of asthma: no  Anticoagulation: no   Pertinent meds: none  Pertinent surgical history: Tonsillectomy    He has a history of esophagitis, severe. He has not been on PPI recently.     SNOT-22=51    Social History     Tobacco Use   Smoking Status Former Smoker    Packs/day: 1.00    Years: 23.00    Pack years: 23.00    Start date: 1961    Last attempt to quit: 1984    Years since quittin.5   Smokeless Tobacco Never Used     Social History     Substance and Sexual Activity   Alcohol Use Yes    Alcohol/week: 1.2 oz    Types: 2 Glasses of wine per week    Comment: half a shot of liquor a night        Review of Systems   Constitutional: Negative for activity change and appetite change.   Eyes: Negative for discharge.   Respiratory: Negative for difficulty breathing and wheezing   Cardiovascular: Negative for chest pain.   Gastrointestinal: Negative for abdominal distention and abdominal pain.   Endocrine: Negative for cold intolerance and heat intolerance.   Genitourinary: Negative for dysuria.   Musculoskeletal: Negative for gait problem and joint swelling.    Skin: Negative for color change and pallor.   Neurological: Negative for syncope and weakness.   Psychiatric/Behavioral: Negative for agitation and confusion.     Objective:        Constitutional:   He is oriented to person, place, and time. He appears well-developed and well-nourished. He appears alert. He is active. Normal speech.      Head:  Normocephalic and atraumatic. Head is without TMJ tenderness. No scars. Salivary glands normal.  Facial strength is normal.      Ears:    Right Ear: No drainage or swelling. No middle ear effusion.   Left Ear: No drainage or swelling.  No middle ear effusion.     Nose:  No mucosal edema, rhinorrhea or sinus tenderness. No turbinate hypertrophy.      Mouth/Throat  Oropharynx clear and moist without lesions or asymmetry, normal uvula midline and mirror exam normal. Normal dentition. No uvula swelling, lacerations or trismus. No oropharyngeal exudate. Tonsillar erythema, tonsillar exudate.    Cords mobile with mild erythema  Vocal fold atrophy bilaterally with thinned voice quality during sustained phonation.       Neck:  Full range of motion with neck supple and no adenopathy. Thyroid tenderness is present. No tracheal deviation, no edema, no erythema, normal range of motion, no stridor, no crepitus and no neck rigidity present. No thyroid mass present.     Cardiovascular:   Intact distal pulses and normal pulses.      Pulmonary/Chest:   Effort normal and breath sounds normal. No stridor.     Psychiatric:   His speech is normal and behavior is normal. His mood appears not anxious. His affect is not labile.     Neurological:   He is alert and oriented to person, place, and time. No sensory deficit.     Skin:   No abrasions, lacerations, lesions, or rashes. No abrasion and no bruising noted.         Tests / Results:  No imaging    Assessment:       1. Laryngopharyngeal reflux (LPR)    2. Gastroesophageal reflux disease with esophagitis          Plan:       Main issues is throat  and headache. He denies major acute changes in his sinus issues for now. Can consider sinus imaging if persistent.     Suspect throat / stomach issues related to LPR / GERD  Prilosec 40 mg daily, correct on proper dosing  NP scope + repeat EGD if persistent reflux issues    Headaches unrelated to throat or sinus. Suspect stress or primary headache disorder.    Fu 2 months

## 2018-08-13 NOTE — PATIENT INSTRUCTIONS
"Take the Prilosec in the am, on an empty stomach, 30 minutes before a meal.    LARYNGOPHARYNGEAL REFLUX  (SILENT OR ATYPICAL REFLUX)    If you have any of the following symptoms you may have laryngopharyngeal reflux (LPR):  hoarseness, thick or too much mucus, chronic throat pain/irritation, chronic throat clearing, chronic cough, especially cough that wake you up from sleep, chronic "postnasal drip" without the need to blow your nose.     Many people with LPR do not have symptoms of heartburn. Compared to the esophagus, the voice box and the back of the throat are significantly more sensitive to the effects of acid and digestive enzymes on surrounding tissue. Acid passing quickly through the esophagus does not have a chance to irritate the area for too long.  However acid that pools in the throat or voice box can cause prolonged irritation resulting in the symptoms of LPR.    The symptoms of LPR can consist of a dry cough and the sensation of something being stuck in the throat.  Some people will complain of heartburn while others may have intermittent hoarseness or loss of voice.  Another major symptom of LPR is "postnasal drip."  Patients are often told symptoms are due to abnormal nasal drainage or sinus infection; however this is rarely the cause of chronic throat irritation. For post nasal drip to cause the complaints described, signs and symptoms of an active nasal infection should be present.     Treatments for LPR include: postural changes (sleeping or laying with an incline), weight reduction, diet modification, medication to reduce stomach acid and promote normal motility, and rarely surgery to prevent reflux. Most patients will begin to notice some relief in her symptoms about 2-4 weeks after starting the medication; however it is generally recommended the medication should be continued for at least 2 months. If the symptoms completely resolve, the medication can then be tapered.  Some people will remain " symptom free while others may have relapses which required treatment again.    Things you may be able to do to prevent reflux.  1. Do not smoke.  Smoking will cause reflux.    2. Avoid tight fitting clothes or belts around the waist.    3. Avoid eating at least 2-3 hours prior to bedtime.  Try to avoid very large meals at night.    4. Weight loss.  For patient's with recent weight gain, shedding a few pounds is all that is required to improve reflux.    5. Avoid reflux triggers. These can worsen acid in the stomach or even cause the esophagus muscles to relax: caffeine, soft drinks, acidic foods (tomato, lots of fruit) mints, alcoholic beverages, particularly at night, cheese, fried foods, spicy foods, eggs, and chocolate.    6. Sleep with the head of bed elevated at least 6 inches.  7. You may also be prescribed a medication, or a medication you take may also be increased. Dr. Dougherty will discuss this with you.

## 2018-08-29 ENCOUNTER — OFFICE VISIT (OUTPATIENT)
Dept: FAMILY MEDICINE | Facility: CLINIC | Age: 73
End: 2018-08-29
Payer: MEDICARE

## 2018-08-29 VITALS
BODY MASS INDEX: 29.17 KG/M2 | HEART RATE: 72 BPM | TEMPERATURE: 98 F | WEIGHT: 192.44 LBS | SYSTOLIC BLOOD PRESSURE: 144 MMHG | OXYGEN SATURATION: 97 % | RESPIRATION RATE: 16 BRPM | HEIGHT: 68 IN | DIASTOLIC BLOOD PRESSURE: 88 MMHG

## 2018-08-29 DIAGNOSIS — J32.8 OTHER CHRONIC SINUSITIS: ICD-10-CM

## 2018-08-29 DIAGNOSIS — J01.11 ACUTE RECURRENT FRONTAL SINUSITIS: Primary | ICD-10-CM

## 2018-08-29 DIAGNOSIS — I48.0 PAROXYSMAL ATRIAL FIBRILLATION: ICD-10-CM

## 2018-08-29 PROCEDURE — 99499 UNLISTED E&M SERVICE: CPT | Mod: HCNC,S$GLB,, | Performed by: NURSE PRACTITIONER

## 2018-08-29 PROCEDURE — 99999 PR PBB SHADOW E&M-EST. PATIENT-LVL III: CPT | Mod: PBBFAC,,, | Performed by: NURSE PRACTITIONER

## 2018-08-29 PROCEDURE — 99214 OFFICE O/P EST MOD 30 MIN: CPT | Mod: S$GLB,,, | Performed by: NURSE PRACTITIONER

## 2018-08-29 RX ORDER — AZITHROMYCIN 250 MG/1
TABLET, FILM COATED ORAL
Qty: 6 TABLET | Refills: 0 | Status: SHIPPED | OUTPATIENT
Start: 2018-08-29 | End: 2018-09-03

## 2018-08-29 RX ORDER — AZITHROMYCIN 250 MG/1
TABLET, FILM COATED ORAL
Qty: 6 TABLET | Refills: 0 | Status: SHIPPED | OUTPATIENT
Start: 2018-08-29 | End: 2018-08-29 | Stop reason: SDUPTHER

## 2018-08-29 NOTE — PROGRESS NOTES
Subjective:       Patient ID: Luke Duff is a 73 y.o. male.    Chief Complaint: Sore Throat (states that he has had this 3 month); sinus congestion; Ear Fullness (and states that they are itching ); and Fatigue    Here today with recurrent sinusitis - this is the first time I am seeing him in clinic.   He states that he has tried all OTC meds in the past with no luck - antihistamines, flonase - now only uses Saline nose spray       Sore Throat    This is a new problem. The current episode started in the past 7 days. The problem has been rapidly worsening. There has been no fever. Associated symptoms include coughing, headaches and a plugged ear sensation. Pertinent negatives include no abdominal pain, congestion, diarrhea, drooling, ear discharge, ear pain, hoarse voice, neck pain, shortness of breath, stridor, swollen glands, trouble swallowing or vomiting. Associated symptoms comments: Eye watery . He has had no exposure to strep or mono. He has tried nothing for the symptoms.   Ear Fullness    There is pain in both ears. This is a recurrent problem. The current episode started 1 to 4 weeks ago. The problem has been gradually worsening. There has been no fever. Associated symptoms include coughing and headaches. Pertinent negatives include no abdominal pain, diarrhea, ear discharge, hearing loss, neck pain, rash, rhinorrhea, sore throat or vomiting. He has tried nothing for the symptoms.     Vitals:    08/29/18 0845   BP: (!) 144/88   Pulse: 72   Resp: 16   Temp: 97.7 °F (36.5 °C)     Review of Systems   Constitutional: Negative.  Negative for fatigue and fever.   HENT: Positive for postnasal drip, sinus pressure and sinus pain. Negative for congestion, dental problem, drooling, ear discharge, ear pain, facial swelling, hearing loss, hoarse voice, rhinorrhea, sore throat and trouble swallowing.    Eyes: Positive for discharge.   Respiratory: Positive for cough and wheezing. Negative for apnea, choking, chest  tightness, shortness of breath and stridor.    Cardiovascular: Negative.  Negative for chest pain and palpitations.   Gastrointestinal: Negative.  Negative for abdominal pain, diarrhea, nausea and vomiting.   Endocrine: Negative.    Genitourinary: Negative.  Negative for dysuria and hematuria.   Musculoskeletal: Negative.  Negative for neck pain.   Skin: Negative for color change and rash.   Allergic/Immunologic: Negative.    Neurological: Positive for headaches. Negative for numbness.   Hematological: Negative.    Psychiatric/Behavioral: Negative.        Past Medical History:   Diagnosis Date    Arthritis     Cancer     Left arm, skin    Chronic lower back pain     radiates to both legs and feet, numbness and tingling, feet and calves    Chronic sinusitis     DDD (degenerative disc disease), lumbar     DJD (degenerative joint disease) of knee     GERD (gastroesophageal reflux disease)     Headache(784.0)     HEARING LOSS     both sides, no hearing aides    Open fracture of right tibia 1992    Posterior capsular opacification visually significant of left eye 2/10/2016    OU done     Objective:      Physical Exam   Constitutional: He is oriented to person, place, and time. He appears well-developed and well-nourished.   HENT:   Head: Normocephalic and atraumatic.   Right Ear: Hearing, tympanic membrane and ear canal normal.   Left Ear: Hearing, tympanic membrane and ear canal normal.   Nose: Right sinus exhibits frontal sinus tenderness. Right sinus exhibits no maxillary sinus tenderness. Left sinus exhibits frontal sinus tenderness. Left sinus exhibits no maxillary sinus tenderness.   Mouth/Throat: Mucous membranes are normal. Posterior oropharyngeal erythema present.   Eyes: Conjunctivae and EOM are normal. Pupils are equal, round, and reactive to light.   Neck: Normal range of motion. Neck supple.   Cardiovascular: Normal rate, regular rhythm, normal heart sounds and intact distal pulses. Exam reveals  no friction rub.   No murmur heard.  Pulmonary/Chest: Effort normal and breath sounds normal. No stridor. No respiratory distress. He has no wheezes.   Abdominal: Soft. Bowel sounds are normal.   Musculoskeletal: Normal range of motion.   Neurological: He is alert and oriented to person, place, and time.   Skin: Skin is warm and dry.   Psychiatric: He has a normal mood and affect. His behavior is normal. Judgment and thought content normal.   Nursing note and vitals reviewed.      Assessment:       1. Acute recurrent frontal sinusitis    2. Other chronic sinusitis    3. Paroxysmal atrial fibrillation        Plan:       Acute recurrent frontal sinusitis  -     azithromycin (Z-LORI) 250 MG tablet; Take 2 tablets by mouth on day 1; Take 1 tablet by mouth on days 2-5  Dispense: 6 tablet; Refill: 0  continue nasal saline     Other chronic sinusitis  -     CT Sinuses without Contrast; Future; Expected date: 08/29/2018    Paroxysmal atrial fibrillation  Reviewed chart and past medical history - he saw Dr Henley for this in the past -No issues sine this time          FU if not better  Would suggest CT sinus since he has chronic issues He wants to talk to Dr Haynes   Discussed allergy component also since he has this off and on - but he is not interested in trying any other allergy preventative meds

## 2018-10-22 ENCOUNTER — TELEPHONE (OUTPATIENT)
Dept: PAIN MEDICINE | Facility: CLINIC | Age: 73
End: 2018-10-22

## 2018-10-22 NOTE — TELEPHONE ENCOUNTER
----- Message from Stacy Quinonez sent at 10/22/2018 12:25 PM CDT -----  Call [phone]  Asking to speak to nurse about his appointment that was cancelled for today

## 2018-10-24 ENCOUNTER — TELEPHONE (OUTPATIENT)
Dept: FAMILY MEDICINE | Facility: CLINIC | Age: 73
End: 2018-10-24

## 2018-10-24 NOTE — TELEPHONE ENCOUNTER
Please call pt and let him know I am currently working in [facility]  I would be happy to see him in [facility] if he would like it  Please help him schedule if needed

## 2018-10-24 NOTE — TELEPHONE ENCOUNTER
----- Message from Kimberly Dumont sent at 10/22/2018  3:23 PM CDT -----  Contact: self  Call placed to POD    Patient need to speak to nurse regarding scheduling appt in [place]     Epic would not allow me to schedule     Please call to advice [phone] (home)

## 2018-10-30 ENCOUNTER — TELEPHONE (OUTPATIENT)
Dept: PAIN MEDICINE | Facility: CLINIC | Age: 73
End: 2018-10-30

## 2018-10-30 NOTE — TELEPHONE ENCOUNTER
----- Message from Joseph Wilson sent at 10/30/2018  9:32 AM CDT -----  Contact: self   Patient miss call from your office please call back at [phone] (zhwo)

## 2018-11-01 ENCOUNTER — OFFICE VISIT (OUTPATIENT)
Dept: FAMILY MEDICINE | Facility: CLINIC | Age: 73
End: 2018-11-01
Payer: MEDICARE

## 2018-11-01 VITALS
TEMPERATURE: 98 F | SYSTOLIC BLOOD PRESSURE: 139 MMHG | HEIGHT: 68 IN | DIASTOLIC BLOOD PRESSURE: 84 MMHG | HEART RATE: 77 BPM | OXYGEN SATURATION: 97 % | WEIGHT: 190.25 LBS | BODY MASS INDEX: 28.83 KG/M2

## 2018-11-01 DIAGNOSIS — J32.9 BACTERIAL SINUSITIS: ICD-10-CM

## 2018-11-01 DIAGNOSIS — R53.83 FATIGUE, UNSPECIFIED TYPE: ICD-10-CM

## 2018-11-01 DIAGNOSIS — N41.1 CHRONIC PROSTATITIS: Primary | ICD-10-CM

## 2018-11-01 DIAGNOSIS — N40.1 BENIGN PROSTATIC HYPERPLASIA WITH LOWER URINARY TRACT SYMPTOMS, SYMPTOM DETAILS UNSPECIFIED: ICD-10-CM

## 2018-11-01 DIAGNOSIS — B96.89 BACTERIAL SINUSITIS: ICD-10-CM

## 2018-11-01 DIAGNOSIS — Z00.00 PERIODIC HEALTH ASSESSMENT, GENERAL SCREENING, ADULT: ICD-10-CM

## 2018-11-01 PROCEDURE — 1101F PT FALLS ASSESS-DOCD LE1/YR: CPT | Mod: CPTII,HCWC,S$GLB, | Performed by: PHYSICIAN ASSISTANT

## 2018-11-01 PROCEDURE — 99214 OFFICE O/P EST MOD 30 MIN: CPT | Mod: PBBFAC,HCWC,PO | Performed by: PHYSICIAN ASSISTANT

## 2018-11-01 PROCEDURE — 99999 PR PBB SHADOW E&M-EST. PATIENT-LVL IV: CPT | Mod: PBBFAC,HCWC,, | Performed by: PHYSICIAN ASSISTANT

## 2018-11-01 PROCEDURE — 99214 OFFICE O/P EST MOD 30 MIN: CPT | Mod: HCWC,S$GLB,, | Performed by: PHYSICIAN ASSISTANT

## 2018-11-01 RX ORDER — DOXYCYCLINE 100 MG/1
100 CAPSULE ORAL 2 TIMES DAILY
Qty: 40 CAPSULE | Refills: 0 | Status: SHIPPED | OUTPATIENT
Start: 2018-11-01 | End: 2018-11-21

## 2018-11-01 RX ORDER — TAMSULOSIN HYDROCHLORIDE 0.4 MG/1
0.4 CAPSULE ORAL DAILY
Qty: 30 CAPSULE | Refills: 5 | Status: SHIPPED | OUTPATIENT
Start: 2018-11-01 | End: 2019-06-12 | Stop reason: SDUPTHER

## 2018-11-01 NOTE — PROGRESS NOTES
Subjective:       Patient ID: Luke Duff is a 73 y.o. male.    Chief Complaint: Sinusitis    HPI   Urinary frequency  nocturia  persistent sinus sx x 3 or 4 wks  fatigue  Review of Systems   Constitutional: Positive for activity change and fatigue. Negative for appetite change, chills, diaphoresis, fever and unexpected weight change.   HENT: Positive for congestion, postnasal drip, sinus pressure and sinus pain. Negative for sore throat.    Eyes: Negative.    Respiratory: Positive for cough. Negative for shortness of breath and wheezing.    Cardiovascular: Negative.  Negative for chest pain and leg swelling.   Gastrointestinal: Negative.    Endocrine: Negative.    Genitourinary: Positive for frequency and urgency. Negative for flank pain and hematuria.   Musculoskeletal: Negative.    Skin: Negative.  Negative for rash.       Objective:      Physical Exam   Constitutional: He appears well-developed and well-nourished. No distress.   HENT:   Head: Normocephalic and atraumatic.   Right Ear: External ear normal.   Left Ear: External ear normal.   Nose: Nose normal.   Mouth/Throat: Oropharynx is clear and moist. No oropharyngeal exudate.   Max sinus tenderness and fullness  Cloudy mucus   Eyes: Conjunctivae are normal. No scleral icterus.   Neck: Normal range of motion. Neck supple. No tracheal deviation present. No thyromegaly present.   Cardiovascular: Normal rate, regular rhythm, normal heart sounds and intact distal pulses. Exam reveals no gallop and no friction rub.   No murmur heard.  Pulmonary/Chest: Effort normal and breath sounds normal. No stridor. No respiratory distress. He has no wheezes. He has no rales.   Genitourinary: Rectum normal.   Genitourinary Comments: Prostate is enlarged with L sided boggy and tender   Musculoskeletal: He exhibits no edema.   Lymphadenopathy:     He has no cervical adenopathy.   Skin: Skin is warm and dry. No rash noted.   Vitals reviewed.      Assessment:       1. Chronic  prostatitis    2. Benign prostatic hyperplasia with lower urinary tract symptoms, symptom details unspecified    3. Bacterial sinusitis    4. Fatigue, unspecified type    5. Periodic health assessment, general screening, adult        Plan:       Chronic prostatitis  -     doxycycline (MONODOX) 100 MG capsule; Take 1 capsule (100 mg total) by mouth 2 (two) times daily. for 20 days  Dispense: 40 capsule; Refill: 0  -     Comprehensive metabolic panel; Future; Expected date: 11/01/2018  -     TSH; Future; Expected date: 11/01/2018  -     CBC auto differential; Future; Expected date: 11/01/2018  -     Urinalysis; Future; Expected date: 11/01/2018  -     Prostate Specific Antigen, Diagnostic; Future; Expected date: 11/01/2018    Benign prostatic hyperplasia with lower urinary tract symptoms, symptom details unspecified  -     tamsulosin (FLOMAX) 0.4 mg Cap; Take 1 capsule (0.4 mg total) by mouth once daily.  Dispense: 30 capsule; Refill: 5  -     Comprehensive metabolic panel; Future; Expected date: 11/01/2018  -     TSH; Future; Expected date: 11/01/2018  -     CBC auto differential; Future; Expected date: 11/01/2018  -     Urinalysis; Future; Expected date: 11/01/2018  -     Prostate Specific Antigen, Diagnostic; Future; Expected date: 11/01/2018    Bacterial sinusitis  -     doxycycline (MONODOX) 100 MG capsule; Take 1 capsule (100 mg total) by mouth 2 (two) times daily. for 20 days  Dispense: 40 capsule; Refill: 0  -     Comprehensive metabolic panel; Future; Expected date: 11/01/2018  -     TSH; Future; Expected date: 11/01/2018  -     CBC auto differential; Future; Expected date: 11/01/2018  -     Urinalysis; Future; Expected date: 11/01/2018  -     Prostate Specific Antigen, Diagnostic; Future; Expected date: 11/01/2018    Fatigue, unspecified type  -     Comprehensive metabolic panel; Future; Expected date: 11/01/2018  -     TSH; Future; Expected date: 11/01/2018  -     CBC auto differential; Future; Expected  date: 11/01/2018  -     Urinalysis; Future; Expected date: 11/01/2018  -     Prostate Specific Antigen, Diagnostic; Future; Expected date: 11/01/2018    Periodic health assessment, general screening, adult  -     Comprehensive metabolic panel; Future; Expected date: 11/01/2018  -     TSH; Future; Expected date: 11/01/2018  -     CBC auto differential; Future; Expected date: 11/01/2018  -     Urinalysis; Future; Expected date: 11/01/2018  -     Prostate Specific Antigen, Diagnostic; Future; Expected date: 11/01/2018    discussed otc's

## 2018-11-07 ENCOUNTER — LAB VISIT (OUTPATIENT)
Dept: LAB | Facility: HOSPITAL | Age: 73
End: 2018-11-07
Attending: PHYSICIAN ASSISTANT
Payer: MEDICARE

## 2018-11-07 ENCOUNTER — OFFICE VISIT (OUTPATIENT)
Dept: PAIN MEDICINE | Facility: CLINIC | Age: 73
End: 2018-11-07
Payer: MEDICARE

## 2018-11-07 VITALS
BODY MASS INDEX: 29.2 KG/M2 | WEIGHT: 192 LBS | TEMPERATURE: 97 F | OXYGEN SATURATION: 97 % | HEART RATE: 90 BPM | RESPIRATION RATE: 18 BRPM | SYSTOLIC BLOOD PRESSURE: 156 MMHG | DIASTOLIC BLOOD PRESSURE: 86 MMHG

## 2018-11-07 DIAGNOSIS — Z00.00 PERIODIC HEALTH ASSESSMENT, GENERAL SCREENING, ADULT: ICD-10-CM

## 2018-11-07 DIAGNOSIS — J32.9 BACTERIAL SINUSITIS: ICD-10-CM

## 2018-11-07 DIAGNOSIS — N41.1 CHRONIC PROSTATITIS: ICD-10-CM

## 2018-11-07 DIAGNOSIS — M54.16 LUMBAR RADICULOPATHY: Primary | ICD-10-CM

## 2018-11-07 DIAGNOSIS — M51.36 DDD (DEGENERATIVE DISC DISEASE), LUMBAR: ICD-10-CM

## 2018-11-07 DIAGNOSIS — R53.83 FATIGUE, UNSPECIFIED TYPE: ICD-10-CM

## 2018-11-07 DIAGNOSIS — B96.89 BACTERIAL SINUSITIS: ICD-10-CM

## 2018-11-07 DIAGNOSIS — N40.1 BENIGN PROSTATIC HYPERPLASIA WITH LOWER URINARY TRACT SYMPTOMS, SYMPTOM DETAILS UNSPECIFIED: ICD-10-CM

## 2018-11-07 DIAGNOSIS — M47.816 LUMBAR SPONDYLOSIS: ICD-10-CM

## 2018-11-07 LAB
ALBUMIN SERPL BCP-MCNC: 3.8 G/DL
ALP SERPL-CCNC: 89 U/L
ALT SERPL W/O P-5'-P-CCNC: 14 U/L
ANION GAP SERPL CALC-SCNC: 6 MMOL/L
AST SERPL-CCNC: 15 U/L
BASOPHILS # BLD AUTO: 0.13 K/UL
BASOPHILS NFR BLD: 1.2 %
BILIRUB SERPL-MCNC: 0.4 MG/DL
BUN SERPL-MCNC: 16 MG/DL
CALCIUM SERPL-MCNC: 9.4 MG/DL
CHLORIDE SERPL-SCNC: 106 MMOL/L
CO2 SERPL-SCNC: 30 MMOL/L
COMPLEXED PSA SERPL-MCNC: 1.3 NG/ML
CREAT SERPL-MCNC: 0.9 MG/DL
DIFFERENTIAL METHOD: ABNORMAL
EOSINOPHIL # BLD AUTO: 1 K/UL
EOSINOPHIL NFR BLD: 8.8 %
ERYTHROCYTE [DISTWIDTH] IN BLOOD BY AUTOMATED COUNT: 14.8 %
EST. GFR  (AFRICAN AMERICAN): >60 ML/MIN/1.73 M^2
EST. GFR  (NON AFRICAN AMERICAN): >60 ML/MIN/1.73 M^2
GLUCOSE SERPL-MCNC: 104 MG/DL
HCT VFR BLD AUTO: 42 %
HGB BLD-MCNC: 14.1 G/DL
IMM GRANULOCYTES # BLD AUTO: 0.32 K/UL
IMM GRANULOCYTES NFR BLD AUTO: 2.9 %
LYMPHOCYTES # BLD AUTO: 2.3 K/UL
LYMPHOCYTES NFR BLD: 20.9 %
MCH RBC QN AUTO: 29.6 PG
MCHC RBC AUTO-ENTMCNC: 33.6 G/DL
MCV RBC AUTO: 88 FL
MONOCYTES # BLD AUTO: 0.7 K/UL
MONOCYTES NFR BLD: 6.7 %
NEUTROPHILS # BLD AUTO: 6.5 K/UL
NEUTROPHILS NFR BLD: 59.5 %
NRBC BLD-RTO: 0 /100 WBC
PLATELET # BLD AUTO: 225 K/UL
PMV BLD AUTO: 11.2 FL
POTASSIUM SERPL-SCNC: 4.6 MMOL/L
PROT SERPL-MCNC: 7.1 G/DL
RBC # BLD AUTO: 4.77 M/UL
SODIUM SERPL-SCNC: 142 MMOL/L
TSH SERPL DL<=0.005 MIU/L-ACNC: 1.67 UIU/ML
WBC # BLD AUTO: 10.97 K/UL

## 2018-11-07 PROCEDURE — 84443 ASSAY THYROID STIM HORMONE: CPT | Mod: HCWC

## 2018-11-07 PROCEDURE — 84153 ASSAY OF PSA TOTAL: CPT | Mod: HCWC

## 2018-11-07 PROCEDURE — 1101F PT FALLS ASSESS-DOCD LE1/YR: CPT | Mod: CPTII,HCWC,S$GLB, | Performed by: PHYSICIAN ASSISTANT

## 2018-11-07 PROCEDURE — 99214 OFFICE O/P EST MOD 30 MIN: CPT | Mod: HCWC,S$GLB,, | Performed by: PHYSICIAN ASSISTANT

## 2018-11-07 PROCEDURE — 99999 PR PBB SHADOW E&M-EST. PATIENT-LVL IV: CPT | Mod: PBBFAC,HCWC,, | Performed by: PHYSICIAN ASSISTANT

## 2018-11-07 PROCEDURE — 85025 COMPLETE CBC W/AUTO DIFF WBC: CPT | Mod: HCWC

## 2018-11-07 PROCEDURE — 80053 COMPREHEN METABOLIC PANEL: CPT | Mod: HCWC

## 2018-11-07 PROCEDURE — 36415 COLL VENOUS BLD VENIPUNCTURE: CPT | Mod: HCWC,PO

## 2018-11-07 RX ORDER — ALPRAZOLAM 0.5 MG/1
1 TABLET, ORALLY DISINTEGRATING ORAL ONCE AS NEEDED
Status: CANCELLED | OUTPATIENT
Start: 2018-12-10 | End: 2018-12-10

## 2018-11-07 NOTE — PROGRESS NOTES
CC: Back pain    HPI: The patient is a 73-year-old man with a history of GERD, COPD, presents in referral from Dr. Eldridge for bilateral foot pain, low back pain.  He is status post caudal epidural steroid injection on 10/04/2017 with almost 100% relief lasting about 9 months.  He states that about 3 months ago he had some mild back pain but this became significantly worse about 2 weeks ago.  He describes burning pain in left low back and left buttock.  He had also has tingling in this region.  The pain is worse if he sits too long.  He also reports soreness in his calf.  He has intermittent left greater than right foot numbness.  He reports having some bladder incontinence issues secondary to his prostate.  He is currently on antibiotics for prostatitis.  He denies weakness or bowel incontinence.    Pain intervention history: He has seen Dr. You for metatarsalgia with Redmond's neuroma, is considering surgical options.  Had some injections in the feet without any major relief.  He tried gabapentin but felt cognitively impaired with this. Left L4 transforaminal epidural steroid injection on 12/17/12 with 85% pain to back, 60% to left side of low back and bilateral foot pain about 10%. He returns in followup today he is status post left L4/5 transforaminal injection on 11/12/13 With about 6 months of relief.  He is status post L4/5 interlaminar epidural steroid injection on 5/19/14 with greater than 50% relief.  He is status post left L3 and L4 transforaminal epidural steroid injection on 3/9/16 with 40% relief.  He is status post left L3 and L4 transforaminal epidural steroid injection on 4/8/16 with 60% relief.  He is status post left GTB injection on 5/2/16 with moderate relief lasting a few weeks.  He is status post left L2, 3, 4 and 5 medial branch radiofrequency ablation on 7/8/16 with 100% relief of his left sided pain.  He is status post right L2, 3, 4 and 5 medial branch radiofrequency ablation on 9/28/16 with  almost complete relief of his right low back pain.  He is status post left L2, 3, 4 and 5 medial branch radiofrequency ablation on 8/9/17 with 100% relief of his arthritic pain.  He is status post left L2, 3, 4 and 5 medial branch radiofrequency ablation on 8/9/17 with 100% relief of his arthritic pain.  He is status post caudal epidural steroid injection on 10/04/2017 with almost 100% relief lasting about 9 months.     ROS:He reports joint pain, back pain.  Balance of review of systems negative.    Medical, surgical, family and social history reviewed elsewhere in record.    Medications/Allergies: See med card      Vitals:    11/07/18 0820   BP: (!) 156/86   Pulse: 90   Resp: 18   Temp: 97 °F (36.1 °C)   TempSrc: Oral   SpO2: 97%   Weight: 87.1 kg (192 lb 0.3 oz)   PainSc:   4   PainLoc: Hip         Physical exam:  Gen: A and O x3, pleasant, well-groomed  Skin: No rashes or obvious lesions  HEENT: PERRLA  CVS: Regular rate and rhythm, normal S1 and S2, no murmurs.  Resp: Clear to auscultation bilaterally, no wheezes or rales.  Musculoskeletal: Able to heel walk, toe walk. No antalgic gait.     Neuro:  Lower extremities: 5/5 strength bilaterally  Reflexes: Patellar 2+, Achilles 2+.  Sensory:  Intact and symmetrical to light touch and pinprick in L2-S1 dermatomes bilaterally.    Lumbar spine:  Lumbar spine: ROM is full with flexion and extension with increased left buttock pain during extension and returning to neutral from flexion.  Jimmy's test is negative bilaterally.  Supine straight leg raise is negative bilaterally.    Internal and external rotation of the hip is negative bilaterally.  Myofascial exam: No tenderness to palpation to the lumbar paraspinous muscles.       Imaging:  MRI Lumbar Spine 11/8/2012  L1-L2: There is minimal posterior disc bulge extending less than 2 mm into the spinal canal. There is minimal thecal sac compression and no foraminal compromise.  L2-L3: There is minimal posterior disc  bulge extending less than 2 mm into the spinal canal. There is minimal thecal sac compression and no foraminal compromise.  L3-L4: There is a diffuse 3-mm posterior disc bulge. There is mild degenerative facet arthrosis with ligamentum flavum thickening. A combination of disc bulge and facet arthrosis results in moderate spinal canal stenosis with concentric compression of the thecal sac to an AP diameter of 8 mm and moderate bilateral foraminal stenosis.  L4-L5: There is a tear of the posterior disc annulus associated with a diffuse 3-mm posterior disc bulge. There is mild degenerative facet arthrosis with ligamentum flavum thickening. A combination of disc bulge and facet arthrosis results in moderate spinal canal stenosis with concentric compression of the thecal sac to an AP diameter of 8 mm in moderate bilateral foraminal stenosis.  L5-S1: There is minimal posterior disc bulge extending less than 2 mm into the spinal canal. There is minimal thecal sac compression and there is no foraminal compromise.    MRI L-spine 11/13/2008: At L1-2 mild broad-based disc bulge asymmetric to the left may contact exiting nerve root.  At L2/3 mild broad-based disc bulge more lateral to the left exiting nerve root on the left comes near the disc bulge.  At L3/4 broad-based disc bulge asymmetric to the left with facet hypertrophy and ligamentous hypertrophy causing mild central canal narrowing.  Moderate left foraminal stenosis.  At L4/5 there is facet arthropathy, broad-based disc bulge causing moderate bilateral foraminal stenosis and mild central canal narrowing.  Possible annular tear at that level.  This most comes near the exiting nerve root at that level.  At L5/S1 no significant central canal stenosis or disc bulging.    MRI lumbar spine 1/5/15  The lumbar vertebral bodies show normal height and signal intensity without evidence of acute compression fracture or pathologic marrow replacement process. Very few scattered  incidentally observed vertebral body hemangiomas present throughout the lumbar spine. There is a grade I retrolisthesis of L3 on L4. There is degenerative disc desiccation at every lumbar level with relative sparing of the L5-S1 intervertebral disc. The conus medullaris terminates at L1-L2. The visualized lower thoracic spinal cord is normal in signal intensity. The incidentally observed retroperitoneal soft tissues are unremarkable.  T12-L1: There is ligamentum flavum thickening. No central canal or neuroforaminal stenosis. No disc protrusion or extrusion.  L1-L2: There is an annular fissure present within the right paracentral portion of the intervertebral disc. No central canal or neuroforaminal stenosis. No disc protrusion or extrusion.  L2-L3: There is a broad disc bulge with a central annular tear. There is mild bilateral hypertrophic facet arthropathy. No central canal or neuroforaminal stenosis. No disc protrusion or extrusion.  L3-L4: There is a broad disc bulge, ligamentum flavum thickening, and hypertrophic facet arthropathy. There is an annular fissure present within the central intervertebral disc. There is mild-moderate central canal stenosis. There is moderate proximalleft neuroforaminal stenosis. There is mild right neuroforaminal stenosis.  L4-L5: There is a broad disc bulge with a central annular fissure/tear. There is ligamentum flavum thickening and hypertrophic facet arthropathy. There is, at most, mild overall central canal stenosis. No neuroforaminal stenosis.  L5-S1: There is bilateral facet arthropathy.    MRI lumbar spine 2/16/16  Vertebral body heights appear well-preserved. Vertebral body alignment appears adequate. Decreased T2 signal is noted at all lumbar levels consistent with degenerative disk desiccation. Postsurgical scarring is noted at L4 level with postsurgical partial laminectomy suspected at L4-L5 level. Spurring is noted at the SI joints bilaterally.  No STIR signal  abnormality is noted to suggest an aggressive osseous process.  At the T12-L1 level, no significant disk bulge, central canal stenosis, or foraminal stenosis noted  At the L1-L2 level, mild broad-based bulging is noted towards the left neuroforamen more noticeable than right of approximately 3 mm. Mild left neural foraminal narrowing is noted with no significant right-sided neural foraminal narrowing or central canal narrowing.  At L2-L3 level, facet arthropathy and ligamentous hypertrophy is noted. Broad-based bulging is noted towards the left neuroforamen greater than right of 3 mm with increased T2 signal suggestive of an annular tear . Mild left neural foraminal narrowing greater than right neural foraminal narrowing is noted. No convincing detrimental change is noted since the prior. Minimal central canal narrowing is noted  At the L3-L4 level, mild ligamentous hypertrophy appears to be present. Broad-based bulging appears to be present towards each neuroforamen with moderate neural foraminal stenosis on the left greater than right. This may be slightly progressed on the right since the prior, less central canal narrowing appears to be present on the prior  At L4-L5 level, evidence of laminectomy is noted. Mild broad-based bulging is noted towards the paracentral left in particular of 3-4 mm. Scar tissue surrounds the right nerve foramen. Mild to moderate left neural foraminal narrowing is suspected mild right neural foraminal narrowing. Less central canal narrowing is noted than on the prior with minimal central canal narrowing suspected. The neural foraminal narrowing on the left may be slightly progressed since the prior  At L5-S1 level, no significant disk bulge, central canal stenosis, or nerve foraminal stenosis noted.      Assessment:  The patient is a 73-year-old man with a history of GERD, COPD, presents in referral from Dr. Paz for bilateral foot pain, low back pain.      1. Lumbar radiculopathy     2. DDD (degenerative disc disease), lumbar    3. Lumbar spondylosis        Plan:  1.  I will schedule patient for a caudal VANESSA.  He had excellent relief with this in the past.  If he does not have relief, we will consider repeating radiofrequency ablation.  We will do his procedure 2 weeks after he completes antibiotics for prostatitis.  2.  Follow-up in 4 weeks postprocedure or sooner as needed.    Greater than 50% of this 25 minute visit was spent on counseling the patient.

## 2018-11-08 ENCOUNTER — TELEPHONE (OUTPATIENT)
Dept: FAMILY MEDICINE | Facility: CLINIC | Age: 73
End: 2018-11-08

## 2018-11-09 NOTE — TELEPHONE ENCOUNTER
Spoke with patient and gave him the results per Ross XAVIER. Patient confirmed his understanding without further questions.

## 2018-11-21 ENCOUNTER — TELEPHONE (OUTPATIENT)
Dept: FAMILY MEDICINE | Facility: CLINIC | Age: 73
End: 2018-11-21

## 2018-11-21 DIAGNOSIS — B96.89 BACTERIAL SINUSITIS: Primary | ICD-10-CM

## 2018-11-21 DIAGNOSIS — J32.9 BACTERIAL SINUSITIS: Primary | ICD-10-CM

## 2018-11-21 RX ORDER — CEFDINIR 300 MG/1
300 CAPSULE ORAL 2 TIMES DAILY
Qty: 20 CAPSULE | Refills: 0 | Status: SHIPPED | OUTPATIENT
Start: 2018-11-21 | End: 2018-12-01

## 2018-11-21 NOTE — TELEPHONE ENCOUNTER
----- Message from Boris Hernandez sent at 11/21/2018 11:59 AM CST -----  Type: Needs Medical Advice    Who Called: patient   Best Call Back Number: [phone]  Additional Information: patient is saying that the medication the office prescribed for the sinus infection is not working and would like another medication called in. Please call patient to advise.

## 2018-11-21 NOTE — TELEPHONE ENCOUNTER
"Spoke with patient and he said that the medication that was prescribed for him "is not working". He also stated that he was "sick in head", that he has a "bad cough" and what he is coughing up is "dark in color and it has dry blood in it". Patient states he just does not feel well. Please advise.   "

## 2018-11-23 NOTE — TELEPHONE ENCOUNTER
Spoke with patient and gave him the message per LIZZY Hawkins. Patient confirmed his understanding without further questions.

## 2018-11-26 ENCOUNTER — TELEPHONE (OUTPATIENT)
Dept: PAIN MEDICINE | Facility: CLINIC | Age: 73
End: 2018-11-26

## 2018-11-26 NOTE — TELEPHONE ENCOUNTER
Spoke with patient. Caudal VANESSA has been changed from 12-10 to 12-19. He was placed on new antibiotics.

## 2018-12-14 ENCOUNTER — TELEPHONE (OUTPATIENT)
Dept: SURGERY | Facility: HOSPITAL | Age: 73
End: 2018-12-14

## 2018-12-14 DIAGNOSIS — M51.36 DDD (DEGENERATIVE DISC DISEASE), LUMBAR: Primary | ICD-10-CM

## 2018-12-14 NOTE — TELEPHONE ENCOUNTER
Patient called pre op center and stated that his appointment was for December 19, not December 17. He stated that is the only day that he can do this because his wife is off of work on that day in order to be able to take him home. Please call patient to reschedule. Thank you

## 2018-12-19 ENCOUNTER — HOSPITAL ENCOUNTER (OUTPATIENT)
Dept: RADIOLOGY | Facility: HOSPITAL | Age: 73
Discharge: HOME | End: 2018-12-19
Attending: ANESTHESIOLOGY | Admitting: ANESTHESIOLOGY
Payer: MEDICARE

## 2018-12-19 ENCOUNTER — HOSPITAL ENCOUNTER (OUTPATIENT)
Facility: HOSPITAL | Age: 73
Discharge: HOME | End: 2018-12-19
Attending: ANESTHESIOLOGY | Admitting: ANESTHESIOLOGY
Payer: MEDICARE

## 2018-12-19 VITALS
HEIGHT: 68 IN | SYSTOLIC BLOOD PRESSURE: 143 MMHG | OXYGEN SATURATION: 98 % | DIASTOLIC BLOOD PRESSURE: 75 MMHG | WEIGHT: 180 LBS | RESPIRATION RATE: 15 BRPM | TEMPERATURE: 98 F | BODY MASS INDEX: 27.28 KG/M2 | HEART RATE: 73 BPM

## 2018-12-19 DIAGNOSIS — M54.16 LUMBAR RADICULOPATHY: Primary | ICD-10-CM

## 2018-12-19 DIAGNOSIS — M51.36 DDD (DEGENERATIVE DISC DISEASE), LUMBAR: ICD-10-CM

## 2018-12-19 PROCEDURE — 25500020 PHARM REV CODE 255: Mod: PO | Performed by: ANESTHESIOLOGY

## 2018-12-19 PROCEDURE — A4216 STERILE WATER/SALINE, 10 ML: HCPCS | Mod: PO | Performed by: ANESTHESIOLOGY

## 2018-12-19 PROCEDURE — 99152 MOD SED SAME PHYS/QHP 5/>YRS: CPT | Mod: ,,, | Performed by: ANESTHESIOLOGY

## 2018-12-19 PROCEDURE — 76000 FLUOROSCOPY <1 HR PHYS/QHP: CPT | Mod: TC,PO

## 2018-12-19 PROCEDURE — 25000003 PHARM REV CODE 250: Mod: PO | Performed by: ANESTHESIOLOGY

## 2018-12-19 PROCEDURE — 62323 NJX INTERLAMINAR LMBR/SAC: CPT | Mod: ,,, | Performed by: ANESTHESIOLOGY

## 2018-12-19 PROCEDURE — 62323 NJX INTERLAMINAR LMBR/SAC: CPT | Mod: PO | Performed by: ANESTHESIOLOGY

## 2018-12-19 PROCEDURE — 63600175 PHARM REV CODE 636 W HCPCS: Mod: PO | Performed by: ANESTHESIOLOGY

## 2018-12-19 RX ORDER — METHYLPREDNISOLONE ACETATE 80 MG/ML
INJECTION, SUSPENSION INTRA-ARTICULAR; INTRALESIONAL; INTRAMUSCULAR; SOFT TISSUE
Status: DISCONTINUED | OUTPATIENT
Start: 2018-12-19 | End: 2018-12-19 | Stop reason: HOSPADM

## 2018-12-19 RX ORDER — SODIUM CHLORIDE, SODIUM LACTATE, POTASSIUM CHLORIDE, CALCIUM CHLORIDE 600; 310; 30; 20 MG/100ML; MG/100ML; MG/100ML; MG/100ML
INJECTION, SOLUTION INTRAVENOUS CONTINUOUS
Status: DISCONTINUED | OUTPATIENT
Start: 2018-12-19 | End: 2018-12-19 | Stop reason: HOSPADM

## 2018-12-19 RX ORDER — LIDOCAINE HYDROCHLORIDE 10 MG/ML
INJECTION, SOLUTION EPIDURAL; INFILTRATION; INTRACAUDAL; PERINEURAL
Status: DISCONTINUED | OUTPATIENT
Start: 2018-12-19 | End: 2018-12-19 | Stop reason: HOSPADM

## 2018-12-19 RX ORDER — SODIUM CHLORIDE 9 MG/ML
INJECTION, SOLUTION INTRAMUSCULAR; INTRAVENOUS; SUBCUTANEOUS
Status: DISCONTINUED | OUTPATIENT
Start: 2018-12-19 | End: 2018-12-19 | Stop reason: HOSPADM

## 2018-12-19 RX ORDER — ALPRAZOLAM 0.5 MG/1
1 TABLET, ORALLY DISINTEGRATING ORAL ONCE AS NEEDED
Status: DISCONTINUED | OUTPATIENT
Start: 2018-12-19 | End: 2018-12-19

## 2018-12-19 RX ORDER — LIDOCAINE HYDROCHLORIDE 10 MG/ML
1 INJECTION INFILTRATION; PERINEURAL ONCE
Status: COMPLETED | OUTPATIENT
Start: 2018-12-19 | End: 2018-12-19

## 2018-12-19 RX ORDER — MIDAZOLAM HYDROCHLORIDE 5 MG/ML
INJECTION INTRAMUSCULAR; INTRAVENOUS
Status: DISCONTINUED | OUTPATIENT
Start: 2018-12-19 | End: 2018-12-19 | Stop reason: HOSPADM

## 2018-12-19 RX ADMIN — LIDOCAINE HYDROCHLORIDE 0.5 ML: 10 INJECTION, SOLUTION EPIDURAL; INFILTRATION; INTRACAUDAL; PERINEURAL at 01:12

## 2018-12-19 RX ADMIN — SODIUM CHLORIDE, SODIUM LACTATE, POTASSIUM CHLORIDE, AND CALCIUM CHLORIDE: .6; .31; .03; .02 INJECTION, SOLUTION INTRAVENOUS at 01:12

## 2018-12-19 NOTE — DISCHARGE INSTRUCTIONS
Home care instructions  Apply ice pack to the injection site for 20 minutes periods for the first 24 hrs for soreness/discomfort at injection site DO NOT USE HEAT FOR 24 HOURS  Keep site clean and dry for 24 hours, remove bandaid when desired  Do not drive until tomorrow  Take care when walking after a lumbar injection  Avoid strenuous activities for 2 days  Make take 2 weeks to feel the full effects   Resume home medication as prescribed today  Resume Aspirin, Plavix, or Coumadin the day after the procedure unless otherwise instructed.    SEE IMMEDIATE MEDICAL HELP FOR:  Severe increase in your usual pain or appearance of new pain  Prolonged or increasing weakness or numbness in the legs or arms  Drainage, redness, active bleeding, or increased swelling at the injection site  Temperature over 100.0 degrees F.  Headache that increases when your head is upright and decreases when you lie flat    CALL 911 OR GO DIRECTLY TO EMERGENCY DEPARTMENT FOR:  Shortness of breath, chest pain, or problems breathing  Recovery After Procedural Sedation (Adult)  You have been given medicine by vein to make you sleep during your surgery. This may have included both a pain medicine and sleeping medicine. Most of the effects have worn off. But you may still have some drowsiness for the next 6 to 8 hours.  Home care  Follow these guidelines when you get home:  · For the next 8 hours, you should be watched by a responsible adult. This person should make sure your condition is not getting worse.  · Don't drink any alcohol for the next 24 hours.  · Don't drive, operate dangerous machinery, or make important business or personal decisions during the next 24 hours.  Note: Your healthcare provider may tell you not to take any medicine by mouth for pain or sleep in the next 4 hours. These medicines may react with the medicines you were given in the hospital. This could cause a much stronger response than usual.  Follow-up care  Follow up with  your healthcare provider if you are not alert and back to your usual level of activity within 12 hours.  When to seek medical advice  Call your healthcare provider right away if any of these occur:  · Drowsiness gets worse  · Weakness or dizziness gets worse  · Repeated vomiting  · You can't be awakened   Date Last Reviewed: 10/18/2016  © 6721-4084 [employer]. [address] [place] [address], [place] [address]. All rights reserved. This information is not intended as a substitute for professional medical care. Always follow your healthcare professional's instructions.

## 2018-12-19 NOTE — DISCHARGE SUMMARY
[facility]  Discharge Note  Short Stay    Admit Date: 12/19/2018    Discharge Date: 12/19/2018    Attending Physician: Josafat Kahn MD     Discharge Provider: Josafat Kahn    Diagnoses:  Active Hospital Problems    Diagnosis  POA    *Lumbar radiculopathy [M54.16]  Yes      Resolved Hospital Problems   No resolved problems to display.       Discharged Condition: good    Final Diagnoses: Lumbar radiculopathy [M54.16]    Disposition: Home or Self Care    Hospital Course: no complications, uneventful    Outcome of Hospitalization, Treatment, Procedure, or Surgery:  Patient was admitted for outpatient procedure. The patient underwent procedure without complications and are discharged home    Follow up/Patient Instructions:  Follow up as scheduled in Pain Management clinic in 3-4 weeks/Patient has received instructions and follow up date and time    Medications:  Continue previous medications    Discharge Procedure Orders   Call MD for:  temperature >100.4     Call MD for:  severe uncontrolled pain     Call MD for:  redness, tenderness, or signs of infection (pain, swelling, redness, odor or green/yellow discharge around incision site)     Call MD for:  severe persistent headache     No dressing needed         Discharge Procedure Orders (must include Diet, Follow-up, Activity):   Discharge Procedure Orders (must include Diet, Follow-up, Activity)   Call MD for:  temperature >100.4     Call MD for:  severe uncontrolled pain     Call MD for:  redness, tenderness, or signs of infection (pain, swelling, redness, odor or green/yellow discharge around incision site)     Call MD for:  severe persistent headache     No dressing needed

## 2018-12-19 NOTE — OP NOTE
PROCEDURE DATE: 12/19/2018    PROCEDURE:  Caudal epidural steroid injection under fluoroscopy.    Diagnosis: Lumbar radiculopathy    Post Op diagnosis: Same    PHYSICIAN: Josafat Kahn M.D.    MEDICATIONS INJECTED:  80 mg of methylprednisone and 4 ml of sterile, preservative-free NaCl.    LOCAL ANESTHETIC GIVEN:  Lidocaine 1%, 4 ml total    SEDATION MEDICATIONS: 4mg versed    ESTIMATED BLOOD LOSS:  none    COMPLICATIONS:  none    TECHNIQUE:   After the patient was placed in prone position, the patient was prepped and draped in the usual sterile fashion using ChloraPrep and sterile towels.  Appropriate anatomic landmarks were determined by identifying the sacral hiatus in the lateral fluoroscopic view.  Local anesthetic was given via a 25g 1.5 inch needle by raising a wheal and infiltrating down to the periosteum.  A 3.5 inch 22 gauge needle was introduced thru the sacral hiatus.  Omnipaque was injected to confirm placement in the appropriate area and that there was no vascular uptake.  The medication was then injected slowly.  The patient tolerated the procedure well.    The patient was monitored after the procedure.  Patient was given post procedure and discharge instructions to follow at home.  The patient was discharged in a stable condition

## 2018-12-19 NOTE — PLAN OF CARE
Patient tolerating oral liquids without difficulty. No apparent s&s of distress noted at this time, no complaints voiced at this time. Injection site free from redness and drainage.  Discharge instructions reviewed with patient/family/friend with good verbal feedback received. Patient ready for discharge

## 2018-12-19 NOTE — H&P
CC: Back pain    HPI: The patient is a 74yo man with a history of lumbar radiculopathy here for caudal VANESSA. There are no major changes in history and physical from 11/7/18.    Past Medical History:   Diagnosis Date    Arthritis     Cancer     Left arm, skin    Chronic lower back pain     radiates to both legs and feet, numbness and tingling, feet and calves    Chronic sinusitis     DDD (degenerative disc disease), lumbar     DJD (degenerative joint disease) of knee     GERD (gastroesophageal reflux disease)     Headache(784.0)     HEARING LOSS     both sides, no hearing aides    Open fracture of right tibia 1992    Posterior capsular opacification visually significant of left eye 2/10/2016    OU done       Past Surgical History:   Procedure Laterality Date    ARTHROPLASTY-KNEE Right 3/8/2017    Performed [facility] OR    BACK SURGERY  04/2015    discectomy/ lumbar     BLOCK-NERVE-MEDIAL BRANCH-LUMBAR L2,3,4,5 Left 7/5/2016    Performed by [facility]at Kahn [facility] at [facility] OR    BLOCK-NERVE-MEDIAL BRANCH-LUMBAR L2,L3,L4,L5 Right 9/7/2016    [facility] by [facility] at [facility] OR    CARPAL TUNNEL RELEASE Left     CATARACT EXTRACTION      OU DONE    CHOLECYSTECTOMY      COLONOSCOPY      COLONOSCOPY  8/6/2009  [place]    Normal colon and TI.    DISKECTOMY-LAMINECTOMY-LUMBAR: Laminotomies, Foraminotomies and Discectomies as needed;  03301-53, 10958 + 38910. Bilateral 4/7/2015    Performed by Mikey Mora Jr., [facility] at [facility] OR    Epidural Steroid Injection      Pain management    VANESSA-TRANSFORAMINAL Left 11/12/2013    Per[facility]ahn [facility] at [facility] OR    VANESSA-TRANSFORAMINAL Left 12/17/2012    Performed by [facility]ahn [facility] at [facility] OR    VANESSA-TRANSFORAMINAL L3 and L4 Left 4/8/2016    [facility] at [facility] OR    VANESSA-TRANSFORAMINAL L3 and L4 Left 3/9/2016    Per[facility] at [facility] OR    ESOPHAGOGASTRODUODENOSCOPY (EGD)  N/A 9/15/2015    Performed by Ivan Dent Jr., MD at [facility] ENDO    ESOPHAGOSCOPY W/ DILATION  12/3/2008  [place]    Grade lV reflux esophagitis, strictured.  Dilated 48 Fr.  Normal stomach and duodenum.    EXCISION-ARAYA'S NEUROMA Bilateral 12/2/2015    Performed by Allan Mcgowan DPM at [facility] OR    EYE SURGERY Bilateral     PHACO bilateral    FOOT SURGERY      both feet/ nerve damage    INJECTION, FACET JOINT Left 2/4/2013    Performed by [facility]at Kahn [facility] at [facility] OR    INJECTION-STEROID-EPIDURAL-CAUDAL N/A 10/4/2017    Performed by [facility]at Kahn [facility] at [facility] OR    INJECTION-STEROID-EPIDURAL-LUMBAR N/A 2/6/2015    Performed by [facility]at Kahn [facility] at [facility] OR    INJECTION-STEROID-EPIDURAL-LUMBAR N/A 5/19/2014    Performed by [facility]at Kahn [facility] at [facility] OR    KNEE CARTILAGE SURGERY Right     about 30 years ago    LAYERED WOUND CLOSURE Left     severe wound laceration left lateral thigh    RADIOFREQUENCY THERMOCOAGULATION (RFTC)-NERVE-MEDIAN BRANCH-LUMBAR L2,3,4,5 Right 9/28/2016    Performed by [facility] Kahn [facility] at [facility] OR    RADIOFREQUENCY THERMOCOAGULATION (RFTC)-NERVE-MEDIAN BRANCH-LUMBAR L2,3,4,5 Left 7/8/2016    Performed by Jos[facility]at Kahn MD at [facility] OR    RADIOFREQUENCY THERMOCOAGULATION (RFTC)-SYMPATHETIC-LUMBAR- L2,L3,L4,L5 Left 8/9/2017    Performed by Jos[facility]at Kahn [facility] at [facility] OR    TONSILLECTOMY      TOTAL KNEE ARTHROPLASTY Right     yag cap Bilateral     ou done//       Family History   Problem Relation Age of Onset    Heart disease Father     Aneurysm Father 82        liver    Stroke Mother     No Known Problems Sister     Amblyopia Neg Hx     Blindness Neg Hx     Cancer Neg Hx     Cataracts Neg Hx     Diabetes Neg Hx     Glaucoma Neg Hx     Hypertension Neg Hx     Macular degeneration Neg Hx     Retinal detachment Neg Hx     Strabismus Neg Hx     Thyroid disease Neg Hx        Social History     Socioeconomic History    Marital  "status:      Spouse name: Not on file    Number of children: Not on file    Years of education: Not on file    Highest education level: Not on file   Social Needs    Financial resource strain: Not on file    Food insecurity - worry: Not on file    Food insecurity - inability: Not on file    Transportation needs - medical: Not on file    Transportation needs - non-medical: Not on file   Occupational History    Not on file   Tobacco Use    Smoking status: Former Smoker     Packs/day: 1.00     Years: 23.00     Pack years: 23.00     Start date: 1961     Last attempt to quit: 1984     Years since quittin.8    Smokeless tobacco: Never Used   Substance and Sexual Activity    Alcohol use: Yes     Alcohol/week: 1.2 oz     Types: 2 Glasses of wine per week     Comment: half a shot of liquor a night    Drug use: No    Sexual activity: Not on file   Other Topics Concern    Not on file   Social History Narrative    Not on file       No current facility-administered medications for this encounter.        Review of patient's allergies indicates:   Allergen Reactions    Codeine Other (See Comments)     Severe stomach spasms    Norco [hydrocodone-acetaminophen] Other (See Comments)     Severe stomach pain    Percocet [oxycodone-acetaminophen]        Vitals:    18 1305   BP: (!) 189/93   Pulse: 69   Resp: 15   Temp: 98 °F (36.7 °C)   TempSrc: Skin   SpO2: 98%   Weight: 81.6 kg (180 lb)   Height: 5' 8" (1.727 m)       REVIEW OF SYSTEMS:     GENERAL: No weight loss, malaise or fevers.  HEENT:  No recent changes in vision or hearing  NECK: Negative for lumps, no difficulty with swallowing.  RESPIRATORY: Negative for cough, wheezing or shortness of breath, patient denies any recent URI.  CARDIOVASCULAR: Negative for chest pain, leg swelling or palpitations.  GI: Negative for abdominal discomfort, blood in stools or black stools or change in bowel habits.  MUSCULOSKELETAL: See HPI.  SKIN: " Negative for lesions, rash, and itching.  PSYCH: No suicidal or homicidal ideations, no current mood disturbances.  HEMATOLOGY/LYMPHOLOGY: Negative for prolonged bleeding, bruising easily or swollen nodes. Patient is not currently taking any anti-coagulants  ENDO: No history of diabetes or thyroid dysfunction  NEURO: No history of syncope, paralysis, seizures or tremors.All other reviewed and negative other than HPI.    Physical exam:  Gen: A and O x3, pleasant, well-groomed  Skin: No rashes or obvious lesions  HEENT: PERRLA, no obvious deformities on ears or in canals. No thyroid masses, trachea midline, no palpable lymph nodes in neck, axilla.  CVS: Regular rate and rhythm, normal S1 and S2, no murmurs.  Resp: Clear to auscultation bilaterally.  Abdomen: Soft, NT/ND, normal bowel sounds present.  Musculoskeletal/Neuro: Moving all extremities    Assessment:  Lumbar radiculopathy  -     Case Request Operating Room: Injection-steroid-epidural-caudal  -     Place in Outpatient; Standing  -     Diet NPO; Standing  -     Discontinue: alprazolam ODT dissolvable tablet 1 mg  -     Notify physician ; Standing  -     Notify physician ; Standing  -     Notify physician (specify); Standing  -     Verify informed consent; Standing  -     Vital signs; Standing    Other orders  -     lactated ringers infusion  -     Insert peripheral IV; Standing  -     lidocaine HCL 10 mg/ml (1%) injection 1 mL

## 2019-01-07 ENCOUNTER — OFFICE VISIT (OUTPATIENT)
Dept: FAMILY MEDICINE | Facility: CLINIC | Age: 74
End: 2019-01-07
Payer: MEDICARE

## 2019-01-07 VITALS
BODY MASS INDEX: 28.74 KG/M2 | SYSTOLIC BLOOD PRESSURE: 138 MMHG | HEART RATE: 78 BPM | WEIGHT: 189.63 LBS | TEMPERATURE: 98 F | DIASTOLIC BLOOD PRESSURE: 74 MMHG | OXYGEN SATURATION: 97 % | RESPIRATION RATE: 16 BRPM | HEIGHT: 68 IN

## 2019-01-07 DIAGNOSIS — M79.672 PAIN IN BOTH FEET: Primary | ICD-10-CM

## 2019-01-07 DIAGNOSIS — M72.2 PLANTAR FASCIITIS: ICD-10-CM

## 2019-01-07 DIAGNOSIS — M79.671 HEEL PAIN, BILATERAL: ICD-10-CM

## 2019-01-07 DIAGNOSIS — M79.672 HEEL PAIN, BILATERAL: ICD-10-CM

## 2019-01-07 DIAGNOSIS — M79.671 PAIN IN BOTH FEET: Primary | ICD-10-CM

## 2019-01-07 PROCEDURE — 99213 PR OFFICE/OUTPT VISIT, EST, LEVL III, 20-29 MIN: ICD-10-PCS | Mod: S$GLB,,, | Performed by: PHYSICIAN ASSISTANT

## 2019-01-07 PROCEDURE — 99213 OFFICE O/P EST LOW 20 MIN: CPT | Mod: S$GLB,,, | Performed by: PHYSICIAN ASSISTANT

## 2019-01-07 PROCEDURE — 99999 PR PBB SHADOW E&M-EST. PATIENT-LVL III: ICD-10-PCS | Mod: PBBFAC,,, | Performed by: PHYSICIAN ASSISTANT

## 2019-01-07 PROCEDURE — 1101F PT FALLS ASSESS-DOCD LE1/YR: CPT | Mod: CPTII,S$GLB,, | Performed by: PHYSICIAN ASSISTANT

## 2019-01-07 PROCEDURE — 1101F PR PT FALLS ASSESS DOC 0-1 FALLS W/OUT INJ PAST YR: ICD-10-PCS | Mod: CPTII,S$GLB,, | Performed by: PHYSICIAN ASSISTANT

## 2019-01-07 PROCEDURE — 99999 PR PBB SHADOW E&M-EST. PATIENT-LVL III: CPT | Mod: PBBFAC,,, | Performed by: PHYSICIAN ASSISTANT

## 2019-01-07 NOTE — PROGRESS NOTES
Subjective:       Patient ID: Luke Duff is a 73 y.o. male.    Chief Complaint: Foot Pain (Patient reports symptoms for about two weeks)    HPI   bilat heel pain x 2 wks  No hx of trauma  New recent footwear  Sx worse in AM  Review of Systems   Constitutional: Positive for activity change. Negative for appetite change, chills, diaphoresis, fatigue, fever and unexpected weight change.   HENT: Negative.    Eyes: Negative.    Respiratory: Negative.    Cardiovascular: Negative.  Negative for chest pain and leg swelling.   Gastrointestinal: Negative.    Endocrine: Negative.    Genitourinary: Negative.    Musculoskeletal: Positive for arthralgias and gait problem.   Skin: Negative.  Negative for rash.       Objective:      Physical Exam   Constitutional: He appears well-developed and well-nourished. No distress.   HENT:   Head: Normocephalic and atraumatic.   Eyes: Conjunctivae are normal. No scleral icterus.   Neck: Normal range of motion. Neck supple. No tracheal deviation present. No thyromegaly present.   Musculoskeletal: Normal range of motion. He exhibits tenderness. He exhibits no edema.   Focal tenderness both heels ant. Plantar calcaneous areas    Lymphadenopathy:     He has no cervical adenopathy.   Skin: Skin is warm and dry. No rash noted.   Vitals reviewed.      Assessment:       1. Pain in both feet    2. Heel pain, bilateral    3. Plantar fasciitis        Plan:       Pain in both feet    Heel pain, bilateral    Plantar fasciitis    discussed footwear  Heel spur pads  Discussed otc's  Discussed home PT  Podiatry f/u if persists

## 2019-01-25 ENCOUNTER — OFFICE VISIT (OUTPATIENT)
Dept: PODIATRY | Facility: CLINIC | Age: 74
End: 2019-01-25
Payer: MEDICARE

## 2019-01-25 VITALS
DIASTOLIC BLOOD PRESSURE: 94 MMHG | HEIGHT: 68 IN | BODY MASS INDEX: 28.95 KG/M2 | SYSTOLIC BLOOD PRESSURE: 178 MMHG | WEIGHT: 191 LBS | HEART RATE: 75 BPM

## 2019-01-25 DIAGNOSIS — M76.62 TENDONITIS, ACHILLES, LEFT: Primary | ICD-10-CM

## 2019-01-25 DIAGNOSIS — M76.61 TENDONITIS, ACHILLES, RIGHT: ICD-10-CM

## 2019-01-25 DIAGNOSIS — M89.8X7 RETROCALCANEAL EXOSTOSIS: ICD-10-CM

## 2019-01-25 PROCEDURE — 99213 OFFICE O/P EST LOW 20 MIN: CPT | Mod: HCNC,S$GLB,, | Performed by: PODIATRIST

## 2019-01-25 PROCEDURE — 99999 PR PBB SHADOW E&M-EST. PATIENT-LVL III: ICD-10-PCS | Mod: PBBFAC,HCNC,, | Performed by: PODIATRIST

## 2019-01-25 PROCEDURE — 1101F PR PT FALLS ASSESS DOC 0-1 FALLS W/OUT INJ PAST YR: ICD-10-PCS | Mod: HCNC,CPTII,S$GLB, | Performed by: PODIATRIST

## 2019-01-25 PROCEDURE — 99213 PR OFFICE/OUTPT VISIT, EST, LEVL III, 20-29 MIN: ICD-10-PCS | Mod: HCNC,S$GLB,, | Performed by: PODIATRIST

## 2019-01-25 PROCEDURE — 1101F PT FALLS ASSESS-DOCD LE1/YR: CPT | Mod: HCNC,CPTII,S$GLB, | Performed by: PODIATRIST

## 2019-01-25 PROCEDURE — 99999 PR PBB SHADOW E&M-EST. PATIENT-LVL III: CPT | Mod: PBBFAC,HCNC,, | Performed by: PODIATRIST

## 2019-01-25 RX ORDER — CHOLECALCIFEROL (VITAMIN D3) 125 MCG
CAPSULE ORAL
Status: ON HOLD | COMMUNITY
End: 2020-07-07 | Stop reason: CLARIF

## 2019-01-25 NOTE — PROGRESS NOTES
Subjective:      Patient ID: Luke Duff is a 73 y.o. male.    Chief Complaint: Foot Problem (Ole bone spurs   PCP  Allan Mejia (DUC)  11/1/18)    Luke is a 73 y.o. male who presents to the podiatry clinic  with complaint of  bilateral foot pain posterior heels left worse than right. Onset of the symptoms was a few years ago. Precipitating event: none known. Current symptoms include: ability to bear weight, but with some pain and worsening symptoms after a period of activity. Aggravating factors: walking and certain shoes. Symptoms have gradually worsened.  Evaluation to date: plain films: posterior calcaneal spurs at the achilles tendon attachment. Treatment to date: stretching, heel lifts and anti inflammatories which have helped some. Patients rates pain 8/10 on pain scale.    Review of Systems   Constitution: Negative for chills and fever.   Cardiovascular: Negative for claudication and leg swelling.   Respiratory: Negative for shortness of breath.    Skin: Negative for itching, nail changes and rash.   Musculoskeletal: Positive for arthritis. Negative for muscle cramps, muscle weakness and myalgias.        Posterior heel pain bilateral   Gastrointestinal: Negative for nausea and vomiting.   Neurological: Negative for focal weakness, loss of balance, numbness and paresthesias.           Objective:      Physical Exam   Constitutional: He is oriented to person, place, and time. He appears well-developed and well-nourished. No distress.   Cardiovascular:   Pulses:       Dorsalis pedis pulses are 2+ on the right side, and 2+ on the left side.        Posterior tibial pulses are 2+ on the right side, and 2+ on the left side.   < 3 sec capillary refill time to toes 1-5 bilateral. Toes and feet are warm to touch proximally with normal distal cooling b/l. There is some hair growth on the feet and toes b/l. There is no edema b/l. No spider veins or varicosities present b/l.      Musculoskeletal:    Posterior heel palpable exostosis noted at the achilles tendon attachment. Pain with palpation more to the left than the right.    Equinus noted b/l ankles with < 5 deg DF noted. MMT 5/5 in DF/PF/Inv/Ev resistance with no reproduction of pain in any direction. Passive range of motion of ankle and pedal joints is painless b/l.     Neurological: He is alert and oriented to person, place, and time. He has normal strength. He displays no atrophy and no tremor. No sensory deficit. He exhibits normal muscle tone.   Negative tinel sign bilateral.   Skin: Skin is warm, dry and intact. No abrasion, no bruising, no burn, no ecchymosis, no laceration, no lesion, no petechiae and no rash noted. He is not diaphoretic. No cyanosis or erythema. No pallor. Nails show no clubbing.   Skin temperature, texture and turgor within normal limits.   Psychiatric: He has a normal mood and affect. His behavior is normal.             Assessment:       Encounter Diagnoses   Name Primary?    Tendonitis, Achilles, left Yes    Tendonitis, Achilles, right     Retrocalcaneal exostosis          Plan:       Luke was seen today for foot problem.    Diagnoses and all orders for this visit:    Tendonitis, Achilles, left  -     Ambulatory consult to Physical Therapy    Tendonitis, Achilles, right  -     Ambulatory consult to Physical Therapy    Retrocalcaneal exostosis  -     Ambulatory consult to Physical Therapy      I counseled the patient on his conditions, their implications and medical management.    Patient will continue to stretch the tendo achilles complex three times daily as demonstrated in the office.  Literature was dispensed illustrating proper stretching technique.    Continue with the heel lifts    Physical therapy consult    Possible orthopedic boot if needed    Discussed surgical resection of the spur if conservative treatment fails, discussed that the recovery is lengthy and may be difficult, will exhaust conservative treatments  before considering surgical resection    Return 6 weeks for follow up    Say Caceres DPM

## 2019-03-11 ENCOUNTER — OFFICE VISIT (OUTPATIENT)
Dept: PODIATRY | Facility: CLINIC | Age: 74
End: 2019-03-11
Payer: MEDICARE

## 2019-03-11 VITALS
BODY MASS INDEX: 28.97 KG/M2 | SYSTOLIC BLOOD PRESSURE: 134 MMHG | HEART RATE: 71 BPM | DIASTOLIC BLOOD PRESSURE: 77 MMHG | HEIGHT: 68 IN | WEIGHT: 191.13 LBS

## 2019-03-11 DIAGNOSIS — M21.6X1 ACQUIRED EQUINUS DEFORMITY OF BOTH FEET: ICD-10-CM

## 2019-03-11 DIAGNOSIS — M89.8X7 RETROCALCANEAL EXOSTOSIS: ICD-10-CM

## 2019-03-11 DIAGNOSIS — M21.6X2 ACQUIRED EQUINUS DEFORMITY OF BOTH FEET: ICD-10-CM

## 2019-03-11 DIAGNOSIS — M76.61 TENDONITIS, ACHILLES, RIGHT: ICD-10-CM

## 2019-03-11 DIAGNOSIS — M76.62 TENDONITIS, ACHILLES, LEFT: Primary | ICD-10-CM

## 2019-03-11 DIAGNOSIS — M72.2 PLANTAR FASCIITIS OF LEFT FOOT: ICD-10-CM

## 2019-03-11 PROCEDURE — 1101F PT FALLS ASSESS-DOCD LE1/YR: CPT | Mod: HCNC,CPTII,S$GLB, | Performed by: PODIATRIST

## 2019-03-11 PROCEDURE — 99999 PR PBB SHADOW E&M-EST. PATIENT-LVL III: CPT | Mod: PBBFAC,HCNC,, | Performed by: PODIATRIST

## 2019-03-11 PROCEDURE — 1101F PR PT FALLS ASSESS DOC 0-1 FALLS W/OUT INJ PAST YR: ICD-10-PCS | Mod: HCNC,CPTII,S$GLB, | Performed by: PODIATRIST

## 2019-03-11 PROCEDURE — 99213 PR OFFICE/OUTPT VISIT, EST, LEVL III, 20-29 MIN: ICD-10-PCS | Mod: HCNC,S$GLB,, | Performed by: PODIATRIST

## 2019-03-11 PROCEDURE — 99999 PR PBB SHADOW E&M-EST. PATIENT-LVL III: ICD-10-PCS | Mod: PBBFAC,HCNC,, | Performed by: PODIATRIST

## 2019-03-11 PROCEDURE — 99213 OFFICE O/P EST LOW 20 MIN: CPT | Mod: HCNC,S$GLB,, | Performed by: PODIATRIST

## 2019-03-11 NOTE — PROGRESS NOTES
Subjective:      Patient ID: Luke Duff is a 73 y.o. male.    Chief Complaint: Follow-up (6 week f/u bilateral spurs, PCP-(LIZZY Burnham)-01/07/2019)    Luke is a 73 y.o. male who presents to the podiatry clinic  with complaint of  bilateral foot pain posterior heels left worse than right. Onset of the symptoms was a few years ago. Precipitating event: none known. Current symptoms include: ability to bear weight, but with some pain and worsening symptoms after a period of activity. Aggravating factors: walking and certain shoes. Symptoms have gradually worsened.  Evaluation to date: plain films: posterior calcaneal spurs at the achilles tendon attachment. Treatment to date: stretching, heel lifts and anti inflammatories which have helped some. Patients rates pain 8/10 on pain scale.    3/11/19: Patient returns for follow up bilateral posterior heel pain, the PT has helped, the pain is more on the left at this point and is now plantar, the posterior pain is mostly better. Wearing new shoes and soft inserts, stretching daily. Relates pain is with weight bearing, and worst after rest for the first few steps.    Review of Systems   Constitution: Negative for chills and fever.   Cardiovascular: Negative for claudication and leg swelling.   Respiratory: Negative for shortness of breath.    Skin: Negative for itching, nail changes and rash.   Musculoskeletal: Positive for arthritis. Negative for muscle cramps, muscle weakness and myalgias.        Posterior heel pain bilateral   Gastrointestinal: Negative for nausea and vomiting.   Neurological: Negative for focal weakness, loss of balance, numbness and paresthesias.           Objective:      Physical Exam   Constitutional: He is oriented to person, place, and time. He appears well-developed and well-nourished. No distress.   Cardiovascular:   Pulses:       Dorsalis pedis pulses are 2+ on the right side, and 2+ on the left side.        Posterior tibial pulses  are 2+ on the right side, and 2+ on the left side.   < 3 sec capillary refill time to toes 1-5 bilateral. Toes and feet are warm to touch proximally with normal distal cooling b/l. There is some hair growth on the feet and toes b/l. There is no edema b/l. No spider veins or varicosities present b/l.      Musculoskeletal:   Posterior heel palpable exostosis noted at the achilles tendon attachment. Minimal tenderness with palpation more to the left than the right.    Pain on palpation plantar central heel more to the posterior aspect but plantar. No pain with ROM or MMT. No pain with medial and lateral compression of heel.    Equinus noted b/l ankles with < 5 deg DF noted. MMT 5/5 in DF/PF/Inv/Ev resistance with no reproduction of pain in any direction. Passive range of motion of ankle and pedal joints is painless b/l.     Neurological: He is alert and oriented to person, place, and time. He has normal strength. He displays no atrophy and no tremor. No sensory deficit. He exhibits normal muscle tone.   Negative tinel sign bilateral.   Skin: Skin is warm, dry and intact. No abrasion, no bruising, no burn, no ecchymosis, no laceration, no lesion, no petechiae and no rash noted. He is not diaphoretic. No cyanosis or erythema. No pallor. Nails show no clubbing.   Skin temperature, texture and turgor within normal limits.   Psychiatric: He has a normal mood and affect. His behavior is normal.             Assessment:       Encounter Diagnoses   Name Primary?    Tendonitis, Achilles, left Yes    Tendonitis, Achilles, right     Retrocalcaneal exostosis     Acquired equinus deformity of both feet          Plan:       Luke was seen today for follow-up.    Diagnoses and all orders for this visit:    Tendonitis, Achilles, left    Tendonitis, Achilles, right    Retrocalcaneal exostosis    Acquired equinus deformity of both feet      I counseled the patient on his conditions, their implications and medical  management.    Patient will continue to stretch the tendo achilles complex three times daily as demonstrated in the office.  Literature was dispensed illustrating proper stretching technique.    Dispensed, fitted and gait trained with orthopedic boot left foot    Discussed wearing better arch supports when he comes out of the boot, [facility].    Return in 4 weeks, sooner PRN    Say Caceres DPM

## 2019-03-11 NOTE — PATIENT INSTRUCTIONS
1. Stretch calf and plantar fascia at least 3x per day for 30 sec and before getting out of bed.    2. Supportive shoes at all times (athletic shoe including jason, new balance, asics, HOKA or casual shoes like Dansko, Conchita, Naot, Vionoic, Fit flop  clog or wedge with extra heel padding and arch support. For house slippers would recommend Fitflop or Spenco found on amazon.com, Never walk barefoot or in flats.    (Varsity sports, Phidippides, [place] running company, Masseys, Goodfeet, Cantilever, Feet First, Foot Solutions, Therapeutic shoes, SAS, [facility] pro shop) http://www.Bubbleball/    3. Orthotic (recommend the following brands: Superfeet, Spenco, Powerstep, Sof Sole Fit Series)              4. NSAID's for 2-3 weeks if no GI or Kidney problems (example: ibuprofen 600 mg 2 x per day)    5. ICE massage with frozen water bottle 2x per day for 30 minutes.    6. Consider night splint, custom orthotics, therapy and/or steroid injection    What Is Plantar Fasciitis?   The plantar fascia is a ligament-like band running from your heel to the ball of your foot. This band pulls on the heel bone, raising the arch of your foot as it pushes off the ground. But if your foot moves incorrectly, the plantar fascia may become strained. The fascia may swell and its tiny fibers may begin to fray, causing plantar fasciitis.  Causes  Plantar fasciitis is often caused by poor foot mechanics. If your foot flattens too much, the fascia may overstretch and swell. If your foot flattens too little, the fascia may ache from being pulled too tight.    The plantar fascia is a thick, fibrous layer of tissue that covers the bones on the bottom of your foot. It holds the foot bones in an arched position. Plantar fasciitis is a painful swelling of the plantar fascia.  A heel spur is an overgrowth of bone where the plantar fascia attaches to the heel bone. The heel spur itself usually doesnt cause pain. However, the heel  spur might be a sign of plantar fasciitis which may cause your foot pain. There is no specific treatment for heel spurs.   Plantar fasciitis can develop slowly or suddenly. It usually affects one foot at a time. Heel pain can feel sharp, like a knife sticking into the bottom of your foot. You may feel pain after exercising, long-distance jogging, stair climbing, long periods of standing, or after standing up.  Risk factors for plantar fasciitis include: arthritis, diabetes, obesity or recent weight gain, flat foot, and having high arches. Wearing high heels, loose shoes, or shoes with poor arch support adds to the risk.    Foot pain is usually worse in the morning. But it improves with walking. By the end of the day there may be a dull aching. Treatment includes short-term rest and controlling inflammation. It may take up to 9 months before all symptoms go away. In rare cases, a steroid injection in the foot, or surgery, may be needed.  Home care  · If you are overweight, lose weight to help healing.  · Choose supportive shoes with good arch support and shock absorbency. Replace athletic shoes when they become worn out. Dont walk or run barefoot.  · Premade or custom-fitted shoe inserts may be helpful. Inserts made of silicone seem to be the most effective. Custom-made inserts can be provided by a podiatrist or foot specialist, physical therapist, or orthopedist.  · Premade or custom-made night splints keep the heel stretched out while you sleep. They may prevent morning pain.  · Avoid activities that stress the feet: jogging, prolonged standing or walking, contact sports, etc.  · First thing in the morning and before sports, stretch the bottom of your foot. Gently flex your ankle so the toes move toward your knee.  · Icing may help control heel pain. Apply an ice pack to the heel for 10-20 minutes as a preventive. Or ice your heel after a severe flare-up of symptoms. You may repeat this every 1-2 hours as  needed.  · You may use over-the-counter pain medicine to control pain, unless another medicine was prescribed. Anti-inflammatory pain medicines, such as ibuprofen or naproxen, may work better than acetaminophen. If you have chronic liver or kidney disease or ever had a stomach ulcer or GI bleeding, talk with your healthcare provider before using these medicines.  · Shoe inserts, a night splint, or a special boot may be needed. Use these as directed by your healthcare provider.      Treating Plantar Fasciitis    First, your doctor relieves pain. Then, the cause of your problem may be found and corrected. If your pain is due to poor foot mechanics, custom-made shoe inserts (orthoses) may help.        Reduce Symptoms:  · To relieve mild symptoms, try aspirin, ibuprofen, or other medications as directed. Rubbing ice on the affected area may also help.  · To reduce severe pain and swelling, your doctor may prescribe pills or injections or a walking cast in some instances. Physical therapy, such as ultrasound or a daily stretching program, may also be recommended. Surgery is rarely required.  · To reduce symptoms caused by poor foot mechanics, your foot may be taped. This supports the arch and temporarily controls movement. Night splints may also help by stretching the fascia.    Control Movement  If taping helps, your doctor may prescribe orthoses. Built from plaster casts of your feet, these inserts control the way your foot moves. As a result, your symptoms should go away.  If Surgery Is Needed  Your doctor may consider surgery if other types of treatment don't control your pain. During surgery, the plantar fascia is partially cut to release tension. As you heal, fibrous tissue fills the space between the heel bone and the plantar fascia.   Reduce Overuse  Every time your foot strikes the ground, the plantar fascia is stretched. You can reduce the strain on the plantar fascia and the possibility of overuse by following  these suggestions:  · Lose any excess weight.  · Avoid running on hard or uneven ground.  · Use orthoses at all times in your shoes and house slippers.  © 6847-1406 [employer]. [address] [place] [address]. All rights reserved. This information is not intended as a substitute for professional medical care. Always follow your healthcare professional's instructions.            _                Lower Body Exercises: Calf Stretch    This exercise both stretches and strengthens your lower body to help your back. Do the exercise as often as suggested by your health care provider. As you work out, dont rush or strain. Use an exercise mat, pillow, or folded towel to protect your knees and other sensitive areas.  · Face a wall 2 feet away. Step toward the wall with one foot.  · Place both palms on the wall and bend your front knee.  · Lean forward, keeping the back leg straight and the heel on the floor.  · Hold for 20 seconds. Switch legs.  © 9064-7144 [employer]. [address] [place] [address]. All rights reserved. This information is not intended as a substitute for professional medical care. Always follow your healthcare professional's instructions.    These instructions are for your right foot. Switch sides for your left foot.  1. Sit in a chair. Rest your right ankle on your left knee.  2. Hold your toes with your right hand. Gently bend the toes backward. Feel a stretch in the undersides of the toes and ball of the foot. Hold for 30 to 60 seconds.  3. Then gently bend the toes in the other direction. Gently press on them until your foot is pointed. Hold for 30 to 60 seconds.  4. Repeat 5 times, or as instructed.  Date Last Reviewed: 5/1/2016  © 7790-6919 [employer]. [address] [place] [address]. All rights reserved. This information is not intended as a substitute for professional medical care. Always follow your healthcare  professional's instructions.

## 2019-03-26 ENCOUNTER — TELEPHONE (OUTPATIENT)
Dept: FAMILY MEDICINE | Facility: CLINIC | Age: 74
End: 2019-03-26

## 2019-06-11 ENCOUNTER — NURSE TRIAGE (OUTPATIENT)
Dept: ADMINISTRATIVE | Facility: CLINIC | Age: 74
End: 2019-06-11

## 2019-06-11 NOTE — TELEPHONE ENCOUNTER
"    Reason for Disposition   EXPOSURE to a body fluid and from needlestick or a wound from a sharp object   DIRTY puncture (dirty skin or needle) AND EXPOSED person NOT fully immunized against tetanus (Examples: received less than 3 tetanus shots; more than 5 years since last booster)    Protocols used: BLOOD AND BODY FLUID EXPOSURE-A-OH, [id number]-C-BS    Luke states his son recently moved into his home with him in [place].  He was emptying the household trash and felt a sharp stick, and found a small syringe with needle intact stuck into his finger.  He states he is concerned for exposure to disease.  He said he is not aware that his son used needles for any medical purpose, but is concerned he may be self - injecting drugs.  Per [facility] triage protocol, recommended ED now for evaluation, site cleansing, tetanus booster (states he has not had one in a "very long time"), and testing.  He would rather an appointment with Dwaine Mejia PA-C, in clinic.  Appt made tomorrow morning at 0800 with Mr Mejia.  He states he will go to the ED if he changes his mind before tomorrow morning.   "

## 2019-06-12 ENCOUNTER — OFFICE VISIT (OUTPATIENT)
Dept: FAMILY MEDICINE | Facility: CLINIC | Age: 74
End: 2019-06-12
Payer: MEDICARE

## 2019-06-12 VITALS
BODY MASS INDEX: 28.87 KG/M2 | OXYGEN SATURATION: 97 % | WEIGHT: 190.5 LBS | HEART RATE: 78 BPM | DIASTOLIC BLOOD PRESSURE: 78 MMHG | HEIGHT: 68 IN | TEMPERATURE: 98 F | SYSTOLIC BLOOD PRESSURE: 120 MMHG

## 2019-06-12 DIAGNOSIS — M77.01 MEDIAL EPICONDYLITIS OF RIGHT ELBOW: ICD-10-CM

## 2019-06-12 DIAGNOSIS — K21.00 GASTROESOPHAGEAL REFLUX DISEASE WITH ESOPHAGITIS: Primary | ICD-10-CM

## 2019-06-12 DIAGNOSIS — N40.1 BENIGN PROSTATIC HYPERPLASIA WITH LOWER URINARY TRACT SYMPTOMS, SYMPTOM DETAILS UNSPECIFIED: ICD-10-CM

## 2019-06-12 PROCEDURE — 1101F PR PT FALLS ASSESS DOC 0-1 FALLS W/OUT INJ PAST YR: ICD-10-PCS | Mod: HCNC,CPTII,S$GLB, | Performed by: PHYSICIAN ASSISTANT

## 2019-06-12 PROCEDURE — 99499 UNLISTED E&M SERVICE: CPT | Mod: HCNC,S$GLB,, | Performed by: PHYSICIAN ASSISTANT

## 2019-06-12 PROCEDURE — 99214 PR OFFICE/OUTPT VISIT, EST, LEVL IV, 30-39 MIN: ICD-10-PCS | Mod: HCNC,S$GLB,, | Performed by: PHYSICIAN ASSISTANT

## 2019-06-12 PROCEDURE — 1101F PT FALLS ASSESS-DOCD LE1/YR: CPT | Mod: HCNC,CPTII,S$GLB, | Performed by: PHYSICIAN ASSISTANT

## 2019-06-12 PROCEDURE — 99999 PR PBB SHADOW E&M-EST. PATIENT-LVL III: ICD-10-PCS | Mod: PBBFAC,HCNC,, | Performed by: PHYSICIAN ASSISTANT

## 2019-06-12 PROCEDURE — 99214 OFFICE O/P EST MOD 30 MIN: CPT | Mod: HCNC,S$GLB,, | Performed by: PHYSICIAN ASSISTANT

## 2019-06-12 PROCEDURE — 99499 RISK ADDL DX/OHS AUDIT: ICD-10-PCS | Mod: HCNC,S$GLB,, | Performed by: PHYSICIAN ASSISTANT

## 2019-06-12 PROCEDURE — 99999 PR PBB SHADOW E&M-EST. PATIENT-LVL III: CPT | Mod: PBBFAC,HCNC,, | Performed by: PHYSICIAN ASSISTANT

## 2019-06-12 RX ORDER — OMEPRAZOLE 40 MG/1
40 CAPSULE, DELAYED RELEASE ORAL DAILY
Qty: 30 CAPSULE | Refills: 11 | Status: SHIPPED | OUTPATIENT
Start: 2019-06-12 | End: 2019-12-17 | Stop reason: SDUPTHER

## 2019-06-12 RX ORDER — TAMSULOSIN HYDROCHLORIDE 0.4 MG/1
0.4 CAPSULE ORAL DAILY
Qty: 30 CAPSULE | Refills: 5 | Status: ON HOLD | OUTPATIENT
Start: 2019-06-12 | End: 2020-07-07 | Stop reason: CLARIF

## 2019-06-12 NOTE — PROGRESS NOTES
Subjective:       Patient ID: Luke Duff is a 73 y.o. male.    Chief Complaint: f/u [facility] (needle stick, took place yesterday)    HPI   Pt was stuck by use needle by accident   Seen in ER where tetanus was given and site cleaned  Blood tests were drawn for HIV and Hepatitis  Used needle was from family member  Pt also needs refills on meds  He is doing well   Pt has also had R elbow pain  Review of Systems   Constitutional: Negative.  Negative for activity change, appetite change, chills, diaphoresis, fatigue, fever and unexpected weight change.   HENT: Negative.    Eyes: Negative.    Respiratory: Negative.    Cardiovascular: Negative.  Negative for chest pain and leg swelling.   Gastrointestinal: Negative.    Endocrine: Negative.    Genitourinary: Negative.    Musculoskeletal: Positive for arthralgias.   Skin: Positive for wound.       Objective:      Physical Exam   Constitutional: He appears well-developed and well-nourished. No distress.   HENT:   Head: Normocephalic and atraumatic.   Eyes: Conjunctivae are normal. No scleral icterus.   Neck: Normal range of motion. Neck supple. No tracheal deviation present. No thyromegaly present.   Musculoskeletal: He exhibits tenderness. He exhibits no edema or deformity.   FROM R elbow without discomfort  Focal tenderness over medial epicondyle    Lymphadenopathy:     He has no cervical adenopathy.   Skin: Skin is warm and dry. No rash noted.   Healing puncture site distal 3 rd digit R hand  No sign of infection    Vitals reviewed.      Assessment:       1. Gastroesophageal reflux disease with esophagitis    2. Benign prostatic hyperplasia with lower urinary tract symptoms, symptom details unspecified    3. Needle stick injury of finger of left hand, subsequent encounter    4. Medial epicondylitis of right elbow        Plan:       Gastroesophageal reflux disease with esophagitis  -     omeprazole (PRILOSEC) 40 MG capsule; Take 1 capsule (40 mg total) by mouth  once daily.  Dispense: 30 capsule; Refill: 11    Benign prostatic hyperplasia with lower urinary tract symptoms, symptom details unspecified  -     tamsulosin (FLOMAX) 0.4 mg Cap; Take 1 capsule (0.4 mg total) by mouth once daily.  Dispense: 30 capsule; Refill: 5    Needle stick injury of finger of left hand, subsequent encounter    Medial epicondylitis of right elbow    recommend pt. Has individual who's needle did stick pt. Checked for HIV and Hepatitis if possible  Recheck lab tests in 6 mos.  Discussed otc's  Discussed home PT

## 2019-08-23 ENCOUNTER — OFFICE VISIT (OUTPATIENT)
Dept: FAMILY MEDICINE | Facility: CLINIC | Age: 74
End: 2019-08-23
Payer: MEDICARE

## 2019-08-23 ENCOUNTER — LAB VISIT (OUTPATIENT)
Dept: LAB | Facility: HOSPITAL | Age: 74
End: 2019-08-23
Attending: OTOLARYNGOLOGY
Payer: MEDICARE

## 2019-08-23 VITALS
SYSTOLIC BLOOD PRESSURE: 140 MMHG | DIASTOLIC BLOOD PRESSURE: 70 MMHG | HEART RATE: 85 BPM | WEIGHT: 192.88 LBS | OXYGEN SATURATION: 96 % | HEIGHT: 68 IN | BODY MASS INDEX: 29.23 KG/M2

## 2019-08-23 DIAGNOSIS — Z12.11 ENCOUNTER FOR SCREENING COLONOSCOPY: ICD-10-CM

## 2019-08-23 DIAGNOSIS — K64.0 GRADE I HEMORRHOIDS: ICD-10-CM

## 2019-08-23 DIAGNOSIS — N41.1 PROSTATITIS, CHRONIC: Primary | ICD-10-CM

## 2019-08-23 DIAGNOSIS — K29.70 GASTRITIS WITHOUT BLEEDING, UNSPECIFIED CHRONICITY, UNSPECIFIED GASTRITIS TYPE: ICD-10-CM

## 2019-08-23 DIAGNOSIS — N41.1 PROSTATITIS, CHRONIC: ICD-10-CM

## 2019-08-23 DIAGNOSIS — K21.00 GASTROESOPHAGEAL REFLUX DISEASE WITH ESOPHAGITIS: ICD-10-CM

## 2019-08-23 DIAGNOSIS — N41.0 ACUTE PROSTATITIS: ICD-10-CM

## 2019-08-23 DIAGNOSIS — R53.83 FATIGUE, UNSPECIFIED TYPE: ICD-10-CM

## 2019-08-23 LAB
BILIRUB UR QL STRIP: NEGATIVE
CLARITY UR: CLEAR
COLOR UR: YELLOW
GLUCOSE UR QL STRIP: NEGATIVE
HGB UR QL STRIP: NEGATIVE
KETONES UR QL STRIP: NEGATIVE
LEUKOCYTE ESTERASE UR QL STRIP: NEGATIVE
NITRITE UR QL STRIP: NEGATIVE
PH UR STRIP: 7 [PH] (ref 5–8)
PROT UR QL STRIP: NEGATIVE
SP GR UR STRIP: 1.02 (ref 1–1.03)
URN SPEC COLLECT METH UR: NORMAL

## 2019-08-23 PROCEDURE — 1101F PR PT FALLS ASSESS DOC 0-1 FALLS W/OUT INJ PAST YR: ICD-10-PCS | Mod: HCNC,CPTII,S$GLB, | Performed by: PHYSICIAN ASSISTANT

## 2019-08-23 PROCEDURE — 81003 URINALYSIS AUTO W/O SCOPE: CPT | Mod: HCNC,PO

## 2019-08-23 PROCEDURE — 99999 PR PBB SHADOW E&M-EST. PATIENT-LVL IV: CPT | Mod: PBBFAC,HCNC,, | Performed by: PHYSICIAN ASSISTANT

## 2019-08-23 PROCEDURE — 99214 PR OFFICE/OUTPT VISIT, EST, LEVL IV, 30-39 MIN: ICD-10-PCS | Mod: HCNC,S$GLB,, | Performed by: PHYSICIAN ASSISTANT

## 2019-08-23 PROCEDURE — 99214 OFFICE O/P EST MOD 30 MIN: CPT | Mod: HCNC,S$GLB,, | Performed by: PHYSICIAN ASSISTANT

## 2019-08-23 PROCEDURE — 99999 PR PBB SHADOW E&M-EST. PATIENT-LVL IV: ICD-10-PCS | Mod: PBBFAC,HCNC,, | Performed by: PHYSICIAN ASSISTANT

## 2019-08-23 PROCEDURE — 1101F PT FALLS ASSESS-DOCD LE1/YR: CPT | Mod: HCNC,CPTII,S$GLB, | Performed by: PHYSICIAN ASSISTANT

## 2019-08-23 RX ORDER — DOXYCYCLINE 100 MG/1
100 CAPSULE ORAL 2 TIMES DAILY
Qty: 40 CAPSULE | Refills: 0 | Status: SHIPPED | OUTPATIENT
Start: 2019-08-23 | End: 2019-09-12

## 2019-08-23 NOTE — PROGRESS NOTES
Subjective:       Patient ID: Luke Duff is a 74 y.o. male.    Chief Complaint: Prostate Problem    HPI   Tender anal bump  Loss of appetite  Gas and bloating x 3 or 4 wks  Urinary frequency  Hx prostate inf  Last colonoscopy 10 yrs ago  Review of Systems   Constitutional: Positive for activity change and fatigue. Negative for appetite change, chills, diaphoresis, fever and unexpected weight change.   HENT: Negative.    Eyes: Negative.    Respiratory: Negative.  Negative for cough and shortness of breath.    Cardiovascular: Negative.  Negative for chest pain and leg swelling.   Gastrointestinal: Positive for abdominal pain, nausea and rectal pain. Negative for abdominal distention, anal bleeding, blood in stool, constipation, diarrhea and vomiting.   Endocrine: Negative.    Genitourinary: Positive for frequency and urgency. Negative for dysuria, flank pain and hematuria.   Musculoskeletal: Negative.    Skin: Negative.  Negative for rash.   Neurological: Negative.        Objective:      Physical Exam   Constitutional: He is oriented to person, place, and time. He appears well-developed and well-nourished. No distress.   HENT:   Head: Normocephalic and atraumatic.   Right Ear: External ear normal.   Left Ear: External ear normal.   Nose: Nose normal.   Mouth/Throat: Oropharynx is clear and moist. No oropharyngeal exudate.   Eyes: Conjunctivae are normal. No scleral icterus.   Neck: Normal range of motion. Neck supple. No tracheal deviation present. No thyromegaly present.   Cardiovascular: Normal rate, regular rhythm, normal heart sounds and intact distal pulses. Exam reveals no gallop and no friction rub.   No murmur heard.  Pulmonary/Chest: Effort normal and breath sounds normal. No stridor. No respiratory distress. He has no wheezes. He has no rales.   Abdominal: Soft. Bowel sounds are normal. He exhibits no distension and no mass. There is tenderness. There is no rebound and no guarding.   Mild mid epigastric  tenderness   Genitourinary:   Genitourinary Comments: On recral exam a single 1 cm tender inflamed ext hemorrhoid is noted at 3 o'clock  Prostate is tender , boggy, and enlarged  No nodules    Musculoskeletal: He exhibits no edema.   Lymphadenopathy:     He has no cervical adenopathy.   Neurological: He is alert and oriented to person, place, and time.   Skin: Skin is warm and dry. No rash noted.   Vitals reviewed.      Assessment:       1. Prostatitis, chronic    2. Gastroesophageal reflux disease with esophagitis    3. Grade I hemorrhoids    4. Gastritis without bleeding, unspecified chronicity, unspecified gastritis type    5. Encounter for screening colonoscopy    6. Fatigue, unspecified type    7. Acute prostatitis        Plan:       Luke was seen today for prostate problem.    Diagnoses and all orders for this visit:    Prostatitis, chronic  -     Urine culture  -     Urinalysis; Future    Gastroesophageal reflux disease with esophagitis  -     Ambulatory consult to Gastroenterology    Grade I hemorrhoids    Gastritis without bleeding, unspecified chronicity, unspecified gastritis type  -     Ambulatory consult to Gastroenterology    Encounter for screening colonoscopy  -     Ambulatory consult to Gastroenterology    Fatigue, unspecified type    Acute prostatitis  -     doxycycline (MONODOX) 100 MG capsule; Take 1 capsule (100 mg total) by mouth 2 (two) times daily. for 20 days  -     Urine culture  -     Urinalysis; Future    DC NSAIDS   Discussed otc's  Discussed supp.  Sitz baths

## 2019-08-26 ENCOUNTER — TELEPHONE (OUTPATIENT)
Dept: FAMILY MEDICINE | Facility: CLINIC | Age: 74
End: 2019-08-26

## 2019-08-26 NOTE — TELEPHONE ENCOUNTER
"----- Message from Sachin HUANG Frisard sent at 8/26/2019  7:08 AM CDT -----  Contact: same  Patient called in and stated he was seen on Friday 8/23/19 and was told by Dwaine to please call if anything changed in his condition.  Patient stated he came in for prostate issues & is now bleeding some.  Patient would like a call back at [phone]    Patient was asked if it was severe enough to get an [facility] nurse on the phone & it stated, "no, not at all".  "

## 2019-08-26 NOTE — TELEPHONE ENCOUNTER
"----- Message from Sachin HUANG Frisard sent at 8/26/2019  7:08 AM CDT -----  Contact: same  Patient called in and stated he was seen on Friday 8/23/19 and was told by Dwaine to please call if anything changed in his condition.  Patient stated he came in for prostate issues & is now bleeding some.  Patient would like a call back at [phone]    Patient was asked if it was severe enough to get an [facility] nurse on the phone & it stated, "no, not at all".  "

## 2019-08-26 NOTE — TELEPHONE ENCOUNTER
Spoke to patient her stated that he was experiencing  bleeding over the weekend it was less as of today

## 2019-10-30 ENCOUNTER — TELEPHONE (OUTPATIENT)
Dept: FAMILY MEDICINE | Facility: CLINIC | Age: 74
End: 2019-10-30

## 2019-10-30 NOTE — TELEPHONE ENCOUNTER
Mr. Duff was contacted and scheduled for his AWV appt New appt slip mailed. Pt voiced understanding of the new date and time of his AWV appt.

## 2019-10-30 NOTE — TELEPHONE ENCOUNTER
----- Message from Frances Mike sent at 10/30/2019  8:11 AM CDT -----  Contact: pt  Type: Needs Medical Advice    Who Called:  pt  Best Call Back Number:   Additional Information: Requesting a call back from Nurse regarding pt is canceling appt Today due to weather ,please call back with advice for another appt for next week

## 2019-11-04 ENCOUNTER — TELEPHONE (OUTPATIENT)
Dept: PAIN MEDICINE | Facility: CLINIC | Age: 74
End: 2019-11-04

## 2019-11-04 NOTE — TELEPHONE ENCOUNTER
----- Message from Princess ABIGAIL Rowland sent at 11/1/2019 10:49 AM CDT -----  Contact: Patient  Patient  Requesting a call back in regards to scheduling an appt for injection for the back. Patient states he is having back problems.  Call back number

## 2019-11-29 ENCOUNTER — PATIENT OUTREACH (OUTPATIENT)
Dept: ADMINISTRATIVE | Facility: HOSPITAL | Age: 74
End: 2019-11-29

## 2019-12-17 ENCOUNTER — OFFICE VISIT (OUTPATIENT)
Dept: FAMILY MEDICINE | Facility: CLINIC | Age: 74
End: 2019-12-17
Payer: MEDICARE

## 2019-12-17 ENCOUNTER — LAB VISIT (OUTPATIENT)
Dept: LAB | Facility: HOSPITAL | Age: 74
End: 2019-12-17
Attending: ALLERGY & IMMUNOLOGY
Payer: MEDICARE

## 2019-12-17 DIAGNOSIS — Z86.79 HISTORY OF ATRIAL FIBRILLATION: ICD-10-CM

## 2019-12-17 DIAGNOSIS — L98.9 SKIN LESION: ICD-10-CM

## 2019-12-17 DIAGNOSIS — E78.1 HYPERTRIGLYCERIDEMIA: ICD-10-CM

## 2019-12-17 DIAGNOSIS — K21.00 GASTROESOPHAGEAL REFLUX DISEASE WITH ESOPHAGITIS: ICD-10-CM

## 2019-12-17 DIAGNOSIS — Z86.79 HISTORY OF ATRIAL FLUTTER: ICD-10-CM

## 2019-12-17 DIAGNOSIS — Z00.00 ENCOUNTER FOR PREVENTIVE HEALTH EXAMINATION: Primary | ICD-10-CM

## 2019-12-17 LAB
CHOLEST SERPL-MCNC: 225 MG/DL (ref 120–199)
CHOLEST/HDLC SERPL: 5.5 {RATIO} (ref 2–5)
HDLC SERPL-MCNC: 41 MG/DL (ref 40–75)
HDLC SERPL: 18.2 % (ref 20–50)
LDLC SERPL CALC-MCNC: 144.6 MG/DL (ref 63–159)
NONHDLC SERPL-MCNC: 184 MG/DL
TRIGL SERPL-MCNC: 197 MG/DL (ref 30–150)

## 2019-12-17 PROCEDURE — 99999 PR PBB SHADOW E&M-EST. PATIENT-LVL V: ICD-10-PCS | Mod: PBBFAC,HCNC,, | Performed by: NURSE PRACTITIONER

## 2019-12-17 PROCEDURE — 36415 COLL VENOUS BLD VENIPUNCTURE: CPT | Mod: HCNC,PO

## 2019-12-17 PROCEDURE — 80061 LIPID PANEL: CPT | Mod: HCNC

## 2019-12-17 PROCEDURE — 99999 PR PBB SHADOW E&M-EST. PATIENT-LVL V: CPT | Mod: PBBFAC,HCNC,, | Performed by: NURSE PRACTITIONER

## 2019-12-17 PROCEDURE — G0439 PPPS, SUBSEQ VISIT: HCPCS | Mod: HCNC,S$GLB,, | Performed by: NURSE PRACTITIONER

## 2019-12-17 PROCEDURE — G0439 PR MEDICARE ANNUAL WELLNESS SUBSEQUENT VISIT: ICD-10-PCS | Mod: HCNC,S$GLB,, | Performed by: NURSE PRACTITIONER

## 2019-12-17 RX ORDER — OMEPRAZOLE 40 MG/1
40 CAPSULE, DELAYED RELEASE ORAL DAILY
Qty: 90 CAPSULE | Refills: 3 | Status: SHIPPED | OUTPATIENT
Start: 2019-12-17 | End: 2020-08-04 | Stop reason: SDUPTHER

## 2019-12-17 NOTE — PROGRESS NOTES
"Luke Duff presented for a  Medicare AWV and comprehensive Health Risk Assessment today. The following components were reviewed and updated:    · Medical history  · Family History  · Social history  · Allergies and Current Medications  · Health Risk Assessment  · Health Maintenance  · Care Team     ** See Completed Assessments for Annual Wellness Visit within the encounter summary.**       The following assessments were completed:  · Living Situation  · CAGE  · Depression Screening  · Timed Get Up and Go  · Whisper Test  · Cognitive Function Screening          · Nutrition Screening  · ADL Screening  · PAQ Screening    Vitals:    12/17/19 0846   BP: 138/82   BP Location: Left arm   Patient Position: Sitting   BP Method: Medium (Manual)   Pulse: 78   SpO2: 98%   Weight: 89.4 kg (197 lb 1.5 oz)   Height: 5' 8" (1.727 m)     Body mass index is 29.97 kg/m².  Physical Exam   Constitutional: He is oriented to person, place, and time. He appears well-nourished. No distress.   HENT:   Head: Normocephalic.   Eyes: Conjunctivae are normal.   Cardiovascular: Normal rate, regular rhythm and normal heart sounds.   No murmur heard.  Pulmonary/Chest: Effort normal and breath sounds normal. No respiratory distress.   Musculoskeletal: He exhibits no edema.   Neurological: He is alert and oriented to person, place, and time. No cranial nerve deficit.   Skin: Skin is warm.   Psychiatric: He has a normal mood and affect.   Vitals reviewed.        Diagnoses and health risks identified today and associated recommendations/orders:    1. Encounter for preventive health examination  Reviewed health maintenance and provided recommendations   Declines vaccines at this time    2. Gastroesophageal reflux disease with esophagitis  Continue to monitor  Followed by Allan Eldridge MD .    - omeprazole (PRILOSEC) 40 MG capsule; Take 1 capsule (40 mg total) by mouth once daily.  Dispense: 90 capsule; Refill: 3    3. Hypertriglyceridemia  Continue " to monitor  Followed by Allan Eldridge MD .    - Lipid panel; Future    4. History of atrial fibrillation  Continue to monitor  Has not be evaluated since hospitalization  - Ambulatory referral to Cardiology    5. History of atrial flutter  Continue to monitor  Did not follow up as recommended during hospitalization  - Ambulatory referral to Cardiology    6. Skin lesion    - Ambulatory referral to Dermatology      Provided Luke with a 5-10 year written screening schedule and personal prevention plan. Recommendations were developed using the USPSTF age appropriate recommendations. Education, counseling, and referrals were provided as needed. After Visit Summary printed and given to patient which includes a list of additional screenings\tests needed.    Follow up in about 1 year (around 12/17/2020).    Janis Guzman NP  I offered to discuss end of life issues, including information on how to make advance directives that the patient could use to name someone who would make medical decisions on their behalf if they became too ill to make themselves.    ___Patient declined  _X_Patient is interested, I provided paper work and offered to discuss.

## 2019-12-17 NOTE — PATIENT INSTRUCTIONS
Counseling and Referral of Other Preventative  (Italic type indicates deductible and co-insurance are waived)    Patient Name: Luke Duff  Today's Date: 12/17/2019    Health Maintenance       Date Due Completion Date    High Dose Statin 07[birth year]/1965 ---    Lipid Panel 12/12/2018 12/12/2017    Influenza Vaccine (1) 12/14/2020 (Originally 9/1/2019) ---    Pneumococcal Vaccine (65+ Low/Medium Risk) (1 of 2 - PCV13) 12/17/2020 (Originally 7/12/2009) ---    Shingles Vaccine (1 of 2) 12/17/2020 (Originally 7/12/1994) ---    Fecal Occult Blood Test (FOBT)/FitKit 11/12/2020 11/12/2019    TETANUS VACCINE 12/12/2027 12/12/2017 (Declined)    Override on 12/12/2017: Declined        No orders of the defined types were placed in this encounter.    The following information is provided to all patients.  This information is to help you find resources for any of the problems found today that may be affecting your health:                Living healthy guide: www.[facility].[place].gov      Understanding Diabetes: www.diabetes.org      Eating healthy: www.cdc.gov/healthyweight      CDC home safety checklist: www.cdc.gov/steadi/patient.html      Agency on Aging: www.goea.[place].gov      Alcoholics anonymous (AA): www.aa.org      Physical Activity: www.shahbaz.nih.gov/fb6eqgb      Tobacco use: www.quitwithusla.org

## 2019-12-18 ENCOUNTER — INITIAL CONSULT (OUTPATIENT)
Dept: DERMATOLOGY | Facility: CLINIC | Age: 74
End: 2019-12-18
Payer: MEDICARE

## 2019-12-18 DIAGNOSIS — L57.0 AK (ACTINIC KERATOSIS): Primary | ICD-10-CM

## 2019-12-18 DIAGNOSIS — L56.4 POLYMORPHIC LIGHT ERUPTION: ICD-10-CM

## 2019-12-18 DIAGNOSIS — L81.4 LENTIGINES: ICD-10-CM

## 2019-12-18 DIAGNOSIS — D48.9 NEOPLASM OF UNCERTAIN BEHAVIOR: ICD-10-CM

## 2019-12-18 DIAGNOSIS — L82.1 SEBORRHEIC KERATOSES: ICD-10-CM

## 2019-12-18 DIAGNOSIS — D18.01 ANGIOMA OF SKIN: ICD-10-CM

## 2019-12-18 DIAGNOSIS — L70.2 ACNE NECROTICA: ICD-10-CM

## 2019-12-18 DIAGNOSIS — Z12.83 SCREENING EXAM FOR SKIN CANCER: ICD-10-CM

## 2019-12-18 PROCEDURE — 99202 OFFICE O/P NEW SF 15 MIN: CPT | Mod: 25,HCNC,S$GLB, | Performed by: DERMATOLOGY

## 2019-12-18 PROCEDURE — 11102 PR TANGENTIAL BIOPSY, SKIN, SINGLE LESION: ICD-10-PCS | Mod: 59,HCNC,S$GLB, | Performed by: DERMATOLOGY

## 2019-12-18 PROCEDURE — 1159F MED LIST DOCD IN RCRD: CPT | Mod: HCNC,S$GLB,, | Performed by: DERMATOLOGY

## 2019-12-18 PROCEDURE — 88305 TISSUE EXAM BY PATHOLOGIST: CPT | Mod: 26,HCNC,, | Performed by: DERMATOLOGY

## 2019-12-18 PROCEDURE — 99999 PR PBB SHADOW E&M-EST. PATIENT-LVL II: CPT | Mod: PBBFAC,HCNC,, | Performed by: DERMATOLOGY

## 2019-12-18 PROCEDURE — 88305 TISSUE EXAM BY PATHOLOGIST: CPT | Mod: HCNC | Performed by: DERMATOLOGY

## 2019-12-18 PROCEDURE — 1101F PT FALLS ASSESS-DOCD LE1/YR: CPT | Mod: HCNC,CPTII,S$GLB, | Performed by: DERMATOLOGY

## 2019-12-18 PROCEDURE — 99999 PR PBB SHADOW E&M-EST. PATIENT-LVL II: ICD-10-PCS | Mod: PBBFAC,HCNC,, | Performed by: DERMATOLOGY

## 2019-12-18 PROCEDURE — 1159F PR MEDICATION LIST DOCUMENTED IN MEDICAL RECORD: ICD-10-PCS | Mod: HCNC,S$GLB,, | Performed by: DERMATOLOGY

## 2019-12-18 PROCEDURE — 99202 PR OFFICE/OUTPT VISIT, NEW, LEVL II, 15-29 MIN: ICD-10-PCS | Mod: 25,HCNC,S$GLB, | Performed by: DERMATOLOGY

## 2019-12-18 PROCEDURE — 1101F PR PT FALLS ASSESS DOC 0-1 FALLS W/OUT INJ PAST YR: ICD-10-PCS | Mod: HCNC,CPTII,S$GLB, | Performed by: DERMATOLOGY

## 2019-12-18 PROCEDURE — 17004 PR DESTRUCTION, PREMALIGNANT LESIONS; 15 OR MORE LESIONS: ICD-10-PCS | Mod: HCNC,S$GLB,, | Performed by: DERMATOLOGY

## 2019-12-18 PROCEDURE — 11102 TANGNTL BX SKIN SINGLE LES: CPT | Mod: 59,HCNC,S$GLB, | Performed by: DERMATOLOGY

## 2019-12-18 PROCEDURE — 88305 TISSUE EXAM BY PATHOLOGIST: ICD-10-PCS | Mod: 26,HCNC,, | Performed by: DERMATOLOGY

## 2019-12-18 PROCEDURE — 17004 DESTROY PREMAL LESIONS 15/>: CPT | Mod: HCNC,S$GLB,, | Performed by: DERMATOLOGY

## 2019-12-18 RX ORDER — TRIAMCINOLONE ACETONIDE 1 MG/G
CREAM TOPICAL 2 TIMES DAILY
Qty: 80 G | Refills: 3 | Status: SHIPPED | OUTPATIENT
Start: 2019-12-18 | End: 2020-12-23

## 2019-12-18 RX ORDER — FLUOROURACIL 50 MG/G
CREAM TOPICAL
Qty: 40 G | Refills: 1 | Status: ON HOLD | OUTPATIENT
Start: 2019-12-18 | End: 2020-07-07 | Stop reason: CLARIF

## 2019-12-18 RX ORDER — CLINDAMYCIN PHOSPHATE 11.9 MG/ML
SOLUTION TOPICAL 2 TIMES DAILY
Qty: 60 ML | Refills: 3 | Status: ON HOLD | OUTPATIENT
Start: 2019-12-18 | End: 2020-07-07 | Stop reason: CLARIF

## 2019-12-18 NOTE — PROGRESS NOTES
Subjective:       Patient ID:  Luke Duff is a 74 y.o. male who presents for   Chief Complaint   Patient presents with    Skin Check    Lesion     Patient present for initial visit, UBSC, Lesions.     C/o lesions to Under R chin, Forehead, Hands, and arms x years. All bleed on occasion with manipulation, lesions are crusty.    yes Phx of NMSC on left arm (unsure what type or date)  no Fhx of melanoma.    Past Medical History:  No date: Allergy  No date: Arthritis  No date: Asthma  No date: Cancer      Comment:  Left arm, skin  No date: Chronic lower back pain      Comment:  radiates to both legs and feet, numbness and tingling,                feet and calves  No date: Chronic sinusitis  No date: DDD (degenerative disc disease), lumbar  No date: DJD (degenerative joint disease) of knee  No date: GERD (gastroesophageal reflux disease)  No date: Headache(784.0)  No date: HEARING LOSS      Comment:  both sides, no hearing aides  1992: Open fracture of right tibia  2/10/2016: Posterior capsular opacification visually significant of   left eye      Comment:  OU done        Review of Systems   Skin: Positive for activity-related sunscreen use. Negative for daily sunscreen use and recent sunburn.   Hematologic/Lymphatic: Bruises/bleeds easily.        Objective:    Physical Exam   Constitutional: He appears well-developed and well-nourished. No distress.   Neurological: He is alert and oriented to person, place, and time. He is not disoriented.   Psychiatric: He has a normal mood and affect.   Skin:   Areas Examined (abnormalities noted in diagram):   Scalp / Hair Palpated and Inspected  Head / Face Inspection Performed  Neck Inspection Performed  Chest / Axilla Inspection Performed  Abdomen Inspection Performed  Back Inspection Performed  RUE Inspected  LUE Inspection Performed                           Diagram Legend     Erythematous scaling macule/papule c/w actinic keratosis       Vascular papule c/w angioma       Pigmented verrucoid papule/plaque c/w seborrheic keratosis      Yellow umbilicated papule c/w sebaceous hyperplasia      Irregularly shaped tan macule c/w lentigo     1-2 mm smooth white papules consistent with Milia      Movable subcutaneous cyst with punctum c/w epidermal inclusion cyst      Subcutaneous movable cyst c/w pilar cyst      Firm pink to brown papule c/w dermatofibroma      Pedunculated fleshy papule(s) c/w skin tag(s)      Evenly pigmented macule c/w junctional nevus     Mildly variegated pigmented, slightly irregular-bordered macule c/w mildly atypical nevus      Flesh colored to evenly pigmented papule c/w intradermal nevus       Pink pearly papule/plaque c/w basal cell carcinoma      Erythematous hyperkeratotic cursted plaque c/w SCC      Surgical scar with no sign of skin cancer recurrence      Open and closed comedones      Inflammatory papules and pustules      Verrucoid papule consistent consistent with wart     Erythematous eczematous patches and plaques     Dystrophic onycholytic nail with subungual debris c/w onychomycosis     Umbilicated papule    Erythematous-base heme-crusted tan verrucoid plaque consistent with inflamed seborrheic keratosis     Erythematous Silvery Scaling Plaque c/w Psoriasis     See annotation          Assessment / Plan:      Pathology Orders:     Normal Orders This Visit    Specimen to Pathology, Dermatology     Questions:    Procedure Type:  Dermatology and skin neoplasms    Number of Specimens:  1    ------------------------:  -------------------------    Spec 1 Procedure:  Biopsy    Spec 1 Clinical Impression:  IDN vs sebaceous adenoma r/o NMSC    Spec 1 Source:  Right temporal scalp        AK (actinic keratosis)  -     fluorouracil (EFUDEX) 5 % cream; AAA bid x 2 weeks  Dispense: 40 g; Refill: 1  -     triamcinolone acetonide 0.1% (KENALOG) 0.1 % cream; Apply topically 2 (two) times daily. Apply for 2 weeks, then take a break for 2 weeks. Repeat as needed.   Dispense: 80 g; Refill: 3    Premalignant nature discussed     Cryosurgery Procedure Note    Verbal consent from the patient is obtained including, but not limited to, risk of hypopigmentation/hyperpigmentation, scar, recurrence of lesion. The patient is aware of the precancerous quality and need for treatment of these lesions. Liquid nitrogen cryosurgery is applied to the 30 actinic keratoses, as detailed in the physical exam, to produce a freeze injury. The patient is aware that blisters may form and is instructed on wound care with gentle cleansing and use of vaseline ointment to keep moist until healed. The patient is supplied a handout on cryosurgery and is instructed to call if lesions do not completely resolve.    - I prescribed the patient Efudex cream to apply to localized areas on the scalp twice daily for 2-3 weeks.  - I discussed that this medication will cause significant irritation   - After application for 2-3 weeks, he was given a prescription for triamcinolone 0.1% cream to apply twice daily for two weeks to calm down inflammation.        Neoplasm of uncertain behavior  -     Specimen to Pathology, Dermatology    Shave biopsy procedure note:    Shave biopsy performed after verbal consent including risk of infection, scar, recurrence, need for additional treatment of site. Area prepped with alcohol, anesthetized with approximately 1.0cc of 1% lidocaine with epinephrine. Lesional tissue shaved with razor blade. Hemostasis achieved with application of aluminum chloride followed by hyfrecation. No complications. Dressing applied. Wound care explained.        Polymorphic light eruption  -     triamcinolone acetonide 0.1% (KENALOG) 0.1 % cream; Apply topically 2 (two) times daily. Apply for 2 weeks, then take a break for 2 weeks. Repeat as needed.  Dispense: 80 g; Refill: 3    - This condition often affects females between the ages of 20 and 40 although it can occasionally affect males.  - It has been  proposed that this condition is caused by an immune reaction to a compound in the skin which is altered by UV radiation (UVA or UVB).  - This condition has an onset within a few hours after sun exposure and may takes a few days to clear.  - We discussed that this condition tends to be seasonal, although most often, this occurs in the early spring.     - Recommend using a daily sunscreen with SPF > 45 and advised patient to practice sun protection and sun avoidance.  - I prescribed triamcinolone 0.1% cream to be applied twice daily to affected areas for two weeks and then tapered to weekends only.   - Side effects of topical steroids were reviewed with the patient including skin atrophy, striae, telangectasias and tachyphylaxis.   - In addition, I also recommended scheduled antihistamines, such as Fexofenadine 180 mg or Zyrtec 10 mg in the morning and evening. Side effects reviewed with the patient.   - Patient may consider spending a few minutes of exposure in the sun during the spring, slowly increasing the duration of time, which can gradually harden the skin.      Acne necrotica  -     clindamycin (CLEOCIN T) 1 % external solution; Apply topically 2 (two) times daily.  Dispense: 60 mL; Refill: 3    Acne necrotica, mildly active in the scalp  - The patient was counseled on the chronic waxing and waning nature for this condition.  - Initiate topical clindamycin lotion 1% at least twice daily up to six times per day.  - May alternate with Head & Shoulders, Selsun Blue, or Neutrogena T-Gel shampoo as desired.    Seborrheic keratoses scattered over the trunk and extremities  - The benign nature of these lesions was discussed with the patient and that no treatment is indicated today.     Cherry angioma(s)  - The benign nature of these lesions was discussed with the patient and that no treatment is indicated today.    Lentigines  - These are benign growths. Reassurance given to patient. No treatment is necessary.   -  The signs and symptoms of skin cancer were reviewed and the patient was advised to practice sun protection and sun avoidance, use daily sunscreen, and perform regular self skin exams    Screening exam for skin cancer (possible history of NMSC)  - The signs and symptoms of skin cancer were reviewed and the patient was advised to practice sun protection and sun avoidance, use daily sunscreen, and perform regular self skin exams.   Upper body skin examination performed today including at least 6 points as noted in physical examination. Suspicious lesions noted.                   Follow up in about 6 months (around 6/18/2020) for skin check.     ADDENDUM:  Skin, right temporal scalp, biopsy:   -INTRADERMAL MELANOCYTIC NEVUS, IRRITATED     Comment: Interpreted by: Akanksha Nichols M.D., Signed on 12/30/2019 at 09:35     Benign. No further treatment is needed.

## 2019-12-19 ENCOUNTER — TELEPHONE (OUTPATIENT)
Dept: DERMATOLOGY | Facility: CLINIC | Age: 74
End: 2019-12-19

## 2019-12-19 NOTE — TELEPHONE ENCOUNTER
----- Message from Sachin Cruz sent at 12/19/2019 12:35 PM CST -----  Contact: same  Patient called in and stated he was seen yesterday 12/18/19 & may have lost his checkbook while in office?  If anyone finds it please call patient at [phone]

## 2019-12-19 NOTE — TELEPHONE ENCOUNTER
Tried to reach pt. No answer, will try again later and I left a message on answering machine.    Contacting to inform pt that checkbook has not been found.

## 2019-12-24 VITALS
HEART RATE: 78 BPM | SYSTOLIC BLOOD PRESSURE: 138 MMHG | WEIGHT: 197.06 LBS | HEIGHT: 68 IN | BODY MASS INDEX: 29.86 KG/M2 | DIASTOLIC BLOOD PRESSURE: 82 MMHG | OXYGEN SATURATION: 98 %

## 2019-12-30 LAB
FINAL PATHOLOGIC DIAGNOSIS: NORMAL
GROSS: NORMAL
MICROSCOPIC EXAM: NORMAL

## 2020-01-16 ENCOUNTER — OFFICE VISIT (OUTPATIENT)
Dept: PAIN MEDICINE | Facility: CLINIC | Age: 75
End: 2020-01-16
Payer: MEDICARE

## 2020-01-16 ENCOUNTER — OFFICE VISIT (OUTPATIENT)
Dept: CARDIOLOGY | Facility: CLINIC | Age: 75
End: 2020-01-16
Payer: MEDICARE

## 2020-01-16 VITALS
SYSTOLIC BLOOD PRESSURE: 132 MMHG | WEIGHT: 191.25 LBS | BODY MASS INDEX: 29.08 KG/M2 | DIASTOLIC BLOOD PRESSURE: 77 MMHG | HEART RATE: 67 BPM | RESPIRATION RATE: 18 BRPM | TEMPERATURE: 99 F

## 2020-01-16 VITALS
HEART RATE: 72 BPM | SYSTOLIC BLOOD PRESSURE: 149 MMHG | HEIGHT: 68 IN | WEIGHT: 193.81 LBS | BODY MASS INDEX: 29.37 KG/M2 | DIASTOLIC BLOOD PRESSURE: 93 MMHG

## 2020-01-16 DIAGNOSIS — I70.0 ATHEROSCLEROSIS OF AORTA: ICD-10-CM

## 2020-01-16 DIAGNOSIS — M54.16 LUMBAR RADICULOPATHY: Primary | ICD-10-CM

## 2020-01-16 DIAGNOSIS — E78.2 MIXED HYPERLIPIDEMIA: ICD-10-CM

## 2020-01-16 DIAGNOSIS — I77.1 TORTUOUS AORTA: ICD-10-CM

## 2020-01-16 DIAGNOSIS — Z86.79 HISTORY OF ATRIAL FIBRILLATION: Primary | ICD-10-CM

## 2020-01-16 DIAGNOSIS — I48.91 ATRIAL FIBRILLATION, UNSPECIFIED TYPE: Primary | ICD-10-CM

## 2020-01-16 DIAGNOSIS — E78.2 MIXED HYPERLIPIDEMIA: Primary | ICD-10-CM

## 2020-01-16 DIAGNOSIS — Z86.79 HISTORY OF ATRIAL FIBRILLATION: ICD-10-CM

## 2020-01-16 DIAGNOSIS — I48.91 ATRIAL FIBRILLATION, UNSPECIFIED TYPE: ICD-10-CM

## 2020-01-16 DIAGNOSIS — Z86.79 HISTORY OF ATRIAL FLUTTER: ICD-10-CM

## 2020-01-16 DIAGNOSIS — M51.36 DDD (DEGENERATIVE DISC DISEASE), LUMBAR: ICD-10-CM

## 2020-01-16 PROCEDURE — 99213 OFFICE O/P EST LOW 20 MIN: CPT | Mod: HCNC,S$GLB,, | Performed by: PHYSICIAN ASSISTANT

## 2020-01-16 PROCEDURE — 1159F PR MEDICATION LIST DOCUMENTED IN MEDICAL RECORD: ICD-10-PCS | Mod: HCNC,S$GLB,, | Performed by: INTERNAL MEDICINE

## 2020-01-16 PROCEDURE — 1126F PR PAIN SEVERITY QUANTIFIED, NO PAIN PRESENT: ICD-10-PCS | Mod: HCNC,S$GLB,, | Performed by: INTERNAL MEDICINE

## 2020-01-16 PROCEDURE — 1101F PT FALLS ASSESS-DOCD LE1/YR: CPT | Mod: HCNC,CPTII,S$GLB, | Performed by: INTERNAL MEDICINE

## 2020-01-16 PROCEDURE — 1101F PR PT FALLS ASSESS DOC 0-1 FALLS W/OUT INJ PAST YR: ICD-10-PCS | Mod: HCNC,CPTII,S$GLB, | Performed by: PHYSICIAN ASSISTANT

## 2020-01-16 PROCEDURE — 1101F PR PT FALLS ASSESS DOC 0-1 FALLS W/OUT INJ PAST YR: ICD-10-PCS | Mod: HCNC,CPTII,S$GLB, | Performed by: INTERNAL MEDICINE

## 2020-01-16 PROCEDURE — 1126F AMNT PAIN NOTED NONE PRSNT: CPT | Mod: HCNC,S$GLB,, | Performed by: INTERNAL MEDICINE

## 2020-01-16 PROCEDURE — 99999 PR PBB SHADOW E&M-EST. PATIENT-LVL III: CPT | Mod: PBBFAC,HCNC,, | Performed by: PHYSICIAN ASSISTANT

## 2020-01-16 PROCEDURE — 99499 RISK ADDL DX/OHS AUDIT: ICD-10-PCS | Mod: HCNC,S$GLB,, | Performed by: INTERNAL MEDICINE

## 2020-01-16 PROCEDURE — 1159F MED LIST DOCD IN RCRD: CPT | Mod: HCNC,S$GLB,, | Performed by: PHYSICIAN ASSISTANT

## 2020-01-16 PROCEDURE — 1125F PR PAIN SEVERITY QUANTIFIED, PAIN PRESENT: ICD-10-PCS | Mod: HCNC,S$GLB,, | Performed by: PHYSICIAN ASSISTANT

## 2020-01-16 PROCEDURE — 99999 PR PBB SHADOW E&M-EST. PATIENT-LVL III: ICD-10-PCS | Mod: PBBFAC,HCNC,, | Performed by: INTERNAL MEDICINE

## 2020-01-16 PROCEDURE — 99204 OFFICE O/P NEW MOD 45 MIN: CPT | Mod: HCNC,S$GLB,, | Performed by: INTERNAL MEDICINE

## 2020-01-16 PROCEDURE — 99999 PR PBB SHADOW E&M-EST. PATIENT-LVL III: CPT | Mod: PBBFAC,HCNC,, | Performed by: INTERNAL MEDICINE

## 2020-01-16 PROCEDURE — 93000 EKG 12-LEAD: ICD-10-PCS | Mod: HCNC,S$GLB,, | Performed by: INTERNAL MEDICINE

## 2020-01-16 PROCEDURE — 99204 PR OFFICE/OUTPT VISIT, NEW, LEVL IV, 45-59 MIN: ICD-10-PCS | Mod: HCNC,S$GLB,, | Performed by: INTERNAL MEDICINE

## 2020-01-16 PROCEDURE — 1125F AMNT PAIN NOTED PAIN PRSNT: CPT | Mod: HCNC,S$GLB,, | Performed by: PHYSICIAN ASSISTANT

## 2020-01-16 PROCEDURE — 99499 UNLISTED E&M SERVICE: CPT | Mod: HCNC,S$GLB,, | Performed by: INTERNAL MEDICINE

## 2020-01-16 PROCEDURE — 1101F PT FALLS ASSESS-DOCD LE1/YR: CPT | Mod: HCNC,CPTII,S$GLB, | Performed by: PHYSICIAN ASSISTANT

## 2020-01-16 PROCEDURE — 99999 PR PBB SHADOW E&M-EST. PATIENT-LVL III: ICD-10-PCS | Mod: PBBFAC,HCNC,, | Performed by: PHYSICIAN ASSISTANT

## 2020-01-16 PROCEDURE — 93000 ELECTROCARDIOGRAM COMPLETE: CPT | Mod: HCNC,S$GLB,, | Performed by: INTERNAL MEDICINE

## 2020-01-16 PROCEDURE — 99213 PR OFFICE/OUTPT VISIT, EST, LEVL III, 20-29 MIN: ICD-10-PCS | Mod: HCNC,S$GLB,, | Performed by: PHYSICIAN ASSISTANT

## 2020-01-16 PROCEDURE — 1159F MED LIST DOCD IN RCRD: CPT | Mod: HCNC,S$GLB,, | Performed by: INTERNAL MEDICINE

## 2020-01-16 PROCEDURE — 1159F PR MEDICATION LIST DOCUMENTED IN MEDICAL RECORD: ICD-10-PCS | Mod: HCNC,S$GLB,, | Performed by: PHYSICIAN ASSISTANT

## 2020-01-16 RX ORDER — ATORVASTATIN CALCIUM 40 MG/1
40 TABLET, FILM COATED ORAL DAILY
Qty: 90 TABLET | Refills: 3 | Status: ON HOLD | OUTPATIENT
Start: 2020-01-16 | End: 2020-07-07 | Stop reason: CLARIF

## 2020-01-16 RX ORDER — SODIUM CHLORIDE, SODIUM LACTATE, POTASSIUM CHLORIDE, CALCIUM CHLORIDE 600; 310; 30; 20 MG/100ML; MG/100ML; MG/100ML; MG/100ML
INJECTION, SOLUTION INTRAVENOUS CONTINUOUS
Status: CANCELLED | OUTPATIENT
Start: 2020-01-27

## 2020-01-16 NOTE — LETTER
January 16, 2020      Janis Guzman, ERIK  [address] [facility] [address] [place] [address]           [facility]  [address] [place] [address]  Phone: [phone]          Patient: Luke Duff   MR Number: [id number]   YOB: 1945   Date of Visit: 1/16/2020       Dear Janis Guzman:    Thank you for referring Luke Duff to me for evaluation. Attached you will find relevant portions of my assessment and plan of care.    If you have questions, please do not hesitate to call me. I look forward to following Luke Duff along with you.    Sincerely,    John Valle MD    Enclosure  CC:  No Recipients    If you would like to receive this communication electronically, please contact [email or web address] or [phone] to request more information on [facility] access.    For providers and/or their staff who would like to refer a patient to [facility], please contact us through our one-stop-shop provider referral line, [facility], [place], at [phone].    If you feel you have received this communication in error or would no longer like to receive these types of communications, please e-mail [email or web address]

## 2020-01-16 NOTE — PROGRESS NOTES
Subjective:    Patient ID:  Luke Duff is a 74 y.o. male who presents for evaluation of Consult (Ref by Janis Guzman NP ); Atrial Fibrillation (First diagnosed 3-4 years ago post Rt knee replacement. ); Atrial Flutter; Fatigue; and Shortness of Breath      Problem List Items Addressed This Visit        Cardiac/Vascular    History of atrial fibrillation - Primary    History of atrial flutter    Mixed hyperlipidemia      Other Visit Diagnoses     Atrial fibrillation, unspecified type              HPI    The patient states that roughly 4 years ago he had a knee replacement, and that evening he was walking with rehab and he converted to atrial fibrillation. Saw a Cardiologist in the hospital. Could not tolerate whatever medications they gave him. Never was shocked.     Was at [facility] and records reviewed. Was in aFib from 3/8/19 - 3/11/19. Was very symptomatic when in aFib.  Does not think he has gone back into aFib since that time.    Does not take BP routinely at home    Personal history of heart attack or stroke - None that he is aware of  Family history of heart disease - Father with CABG, uncle with MI as well    Past Medical History:   Diagnosis Date    Allergy     Arthritis     Asthma     Cancer     Left arm, skin    Chronic lower back pain     radiates to both legs and feet, numbness and tingling, feet and calves    Chronic sinusitis     DDD (degenerative disc disease), lumbar     DJD (degenerative joint disease) of knee     GERD (gastroesophageal reflux disease)     Headache(784.0)     HEARING LOSS     both sides, no hearing aides    Open fracture of right tibia 1992    Posterior capsular opacification visually significant of left eye 2/10/2016    OU done       Past Surgical History:   Procedure Laterality Date    BACK SURGERY  04/2015    discectomy/ lumbar     CARPAL TUNNEL RELEASE Left     CATARACT EXTRACTION      OU DONE    CAUDAL EPIDURAL STEROID INJECTION N/A 12/19/2018     Procedure: Injection-steroid-epidural-caudal;  Surgeon: Josafat Kahn MD;  Location: [facility] OR;  Service: Pain Management;  Laterality: N/A;    CHOLECYSTECTOMY      COLONOSCOPY      COLONOSCOPY  2009  [facility]    Normal colon and TI.    Epidural Steroid Injection      Pain management    ESOPHAGOSCOPY W/ DILATION  12/3/2008  [place]    Grade lV reflux esophagitis, strictured.  Dilated 48 Fr.  Normal stomach and duodenum.    EYE SURGERY Bilateral     PHACO bilateral    FOOT SURGERY      both feet/ nerve damage    KNEE CARTILAGE SURGERY Right     about 30 years ago    LAYERED WOUND CLOSURE Left     severe wound laceration left lateral thigh    TONSILLECTOMY      TOTAL KNEE ARTHROPLASTY Right     yag cap Bilateral     ou done//       Family History   Problem Relation Age of Onset    Heart disease Father     Aneurysm Father 82        liver    Stroke Mother     No Known Problems Sister     Amblyopia Neg Hx     Blindness Neg Hx     Cancer Neg Hx     Cataracts Neg Hx     Diabetes Neg Hx     Glaucoma Neg Hx     Hypertension Neg Hx     Macular degeneration Neg Hx     Retinal detachment Neg Hx     Strabismus Neg Hx     Thyroid disease Neg Hx        Social History     Socioeconomic History    Marital status:      Spouse name: Not on file    Number of children: Not on file    Years of education: Not on file    Highest education level: Not on file   Occupational History    Not on file   Social Needs    Financial resource strain: Not on file    Food insecurity:     Worry: Not on file     Inability: Not on file    Transportation needs:     Medical: Not on file     Non-medical: Not on file   Tobacco Use    Smoking status: Former Smoker     Packs/day: 1.00     Years: 23.00     Pack years: 23.00     Start date: 1961     Last attempt to quit: 1984     Years since quittin.9    Smokeless tobacco: Never Used   Substance and Sexual Activity    Alcohol use: Yes      Alcohol/week: 2.0 standard drinks     Types: 2 Glasses of wine per week     Comment: half a shot of liquor a night    Drug use: No    Sexual activity: Not on file   Lifestyle    Physical activity:     Days per week: Not on file     Minutes per session: Not on file    Stress: Not on file   Relationships    Social connections:     Talks on phone: Not on file     Gets together: Not on file     Attends Latter day service: Not on file     Active member of club or organization: Not on file     Attends meetings of clubs or organizations: Not on file     Relationship status: Not on file   Other Topics Concern    Not on file   Social History Narrative    Not on file       Review of patient's allergies indicates:   Allergen Reactions    Codeine Other (See Comments)     Severe stomach spasms  Other reaction(s): Stomach upset    Ciprofloxacin hcl      Other reaction(s): agitation    Norco [hydrocodone-acetaminophen] Other (See Comments)     Severe stomach pain    Percocet [oxycodone-acetaminophen]        Review of Systems   Constitution: Negative for decreased appetite, fever and malaise/fatigue.   Eyes: Negative for blurred vision.   Cardiovascular: Negative for chest pain, dyspnea on exertion, irregular heartbeat and leg swelling.   Respiratory: Negative for cough, hemoptysis, shortness of breath and wheezing.    Endocrine: Negative for cold intolerance and heat intolerance.   Hematologic/Lymphatic: Negative for bleeding problem.   Musculoskeletal: Negative for muscle weakness and myalgias.   Gastrointestinal: Negative for abdominal pain, constipation and diarrhea.   Genitourinary: Negative for bladder incontinence.   Neurological: Negative for dizziness and weakness.   Psychiatric/Behavioral: Negative for depression.        Objective:     Vitals:    01/16/20 0948   BP: (!) 149/93   BP Location: Right arm   Patient Position: Sitting   BP Method: Medium (Automatic)   Pulse: 72   Weight: 87.9 kg (193 lb 12.6 oz)  "  Height: 5' 8" (1.727 m)        Physical Exam   Constitutional: He is oriented to person, place, and time. He appears well-developed and well-nourished.   HENT:   Head: Normocephalic and atraumatic.   Neck: Normal range of motion. Neck supple. No JVD present.   Cardiovascular: Normal rate, regular rhythm and normal heart sounds. Exam reveals no gallop and no friction rub.   No murmur heard.  Pulmonary/Chest: Effort normal and breath sounds normal. No respiratory distress. He has no wheezes. He has no rales.   Abdominal: Soft. Bowel sounds are normal. There is no tenderness. There is no rebound and no guarding.   Musculoskeletal: He exhibits no edema.   Neurological: He is alert and oriented to person, place, and time.   Skin: Skin is warm and dry.   Psychiatric: His behavior is normal.           Current Outpatient Medications on File Prior to Visit   Medication Sig    ASPIRIN/SALICYLAMIDE/CAFFEINE (BC HEADACHE POWDER ORAL) Take by mouth.    butalbital-acetaminophen-caffeine -40 mg (FIORICET, ESGIC) -40 mg per tablet Take 1 tablet by mouth every 6 (six) hours as needed.     clindamycin (CLEOCIN T) 1 % external solution Apply topically 2 (two) times daily.    diclofenac sodium (VOLTAREN) 1 % Gel Apply 2 g topically 2 (two) times daily.    fluorouracil (EFUDEX) 5 % cream AAA bid x 2 weeks    naproxen sodium (ALEVE) 220 mg Cap Take by mouth.    omeprazole (PRILOSEC) 40 MG capsule Take 1 capsule (40 mg total) by mouth once daily.    triamcinolone acetonide 0.1% (KENALOG) 0.1 % cream Apply topically 2 (two) times daily. Apply for 2 weeks, then take a break for 2 weeks. Repeat as needed.    tamsulosin (FLOMAX) 0.4 mg Cap Take 1 capsule (0.4 mg total) by mouth once daily. (Patient not taking: Reported on 12/17/2019)     No current facility-administered medications on file prior to visit.        Lipid Panel:   Lab Results   Component Value Date    CHOL 225 (H) 12/17/2019    HDL 41 12/17/2019    LDLCALC " 144.6 12/17/2019    TRIG 197 (H) 12/17/2019    CHOLHDL 18.2 (L) 12/17/2019         The 10-year ASCVD risk score (Vasquez LINDSEY Jr., et al., 2013) is: 34%    Values used to calculate the score:      Age: 74 years      Sex: Male      Is Non- : No      Diabetic: No      Tobacco smoker: No      Systolic Blood Pressure: 149 mmHg      Is BP treated: No      HDL Cholesterol: 41 mg/dL      Total Cholesterol: 225 mg/dL    All pertinent labs, imaging, and EKGs reviewed.    Assessment:       1. History of atrial fibrillation    2. History of atrial flutter    3. Mixed hyperlipidemia    4. Atrial fibrillation, unspecified type      Plan:     Symptoms OK at this time  BP borderline high today    Home BP log   Start atorvastatin 40 mg PO Daily secondary to elevated ASCVD risk score - Educated on risks/benefits of medication   Echocardiogram   Educated on pulse checks extensively and he feels comfortable monitoring his aFib this way understanding the risk of stroke, discussed other options including loop recorder or monitor    Continue other cardiac medications  Mediterranean Diet/Cardiovascular Exercise Program    Patient queried and all questions were answered.    F/u in 9 months to reassess      Signed:    John Valle MD  1/16/2020 9:05 AM

## 2020-01-19 NOTE — PROGRESS NOTES
CC: Back pain    HPI: The patient is a 74-year-old man with a history of GERD, COPD, presents in referral from Dr. Eldridge for bilateral foot pain, low back pain. He is status post caudal epidural steroid injection on 12/19/18 with 100% relief lasting about 10 months.  His pain returned a couple months ago and is located in the left low back, left buttock, left groin and some soreness in the anterior thighs.  He describes his left buttock pain is tingling, burning and biting.  His pain is worse with activity and improved with rest.  He reports weakness in his legs and numbness in his feet.  He reports some bladder incontinence due to his prostate.  He denies bowel incontinence.    Pain intervention history: He has seen Dr. You for metatarsalgia with Redmond's neuroma, is considering surgical options.  Had some injections in the feet without any major relief.  He tried gabapentin but felt cognitively impaired with this. Left L4 transforaminal epidural steroid injection on 12/17/12 with 85% pain to back, 60% to left side of low back and bilateral foot pain about 10%. He returns in followup today he is status post left L4/5 transforaminal injection on 11/12/13 With about 6 months of relief.  He is status post L4/5 interlaminar epidural steroid injection on 5/19/14 with greater than 50% relief.  He is status post left L3 and L4 transforaminal epidural steroid injection on 3/9/16 with 40% relief.  He is status post left L3 and L4 transforaminal epidural steroid injection on 4/8/16 with 60% relief.  He is status post left GTB injection on 5/2/16 with moderate relief lasting a few weeks.  He is status post left L2, 3, 4 and 5 medial branch radiofrequency ablation on 7/8/16 with 100% relief of his left sided pain.  He is status post right L2, 3, 4 and 5 medial branch radiofrequency ablation on 9/28/16 with almost complete relief of his right low back pain.  He is status post left L2, 3, 4 and 5 medial branch radiofrequency  ablation on 8/9/17 with 100% relief of his arthritic pain.  He is status post left L2, 3, 4 and 5 medial branch radiofrequency ablation on 8/9/17 with 100% relief of his arthritic pain.  He is status post caudal epidural steroid injection on 10/04/2017 with almost 100% relief lasting about 9 months.  He is status post caudal epidural steroid injection on 12/19/18 with 100% relief lasting about 10 months.      ROS:He reports joint pain, back pain.  Balance of review of systems negative.    Medical, surgical, family and social history reviewed elsewhere in record.    Medications/Allergies: See med card      Vitals:    01/16/20 1045   BP: 132/77   Pulse: 67   Resp: 18   Temp: 98.7 °F (37.1 °C)   TempSrc: Oral   Weight: 86.7 kg (191 lb 4 oz)   PainSc:   6   PainLoc: Back         Physical exam:  Gen: A and O x3, pleasant, well-groomed  Skin: No rashes or obvious lesions  HEENT: PERRLA  CVS: Regular rate and rhythm, normal S1 and S2, no murmurs.  Resp: Clear to auscultation bilaterally, no wheezes or rales.  Musculoskeletal: Able to heel walk, toe walk. No antalgic gait.     Neuro:  Lower extremities: 5/5 strength bilaterally  Reflexes: Patellar 2+, Achilles 2+.  Sensory:  Intact and symmetrical to light touch and pinprick in L2-S1 dermatomes bilaterally.    Lumbar spine:  Lumbar spine: ROM is full with flexion and extension with increased left buttock pain during each maneuver.  Jimym's test is negative bilaterally.  Supine straight leg raise is negative bilaterally.    Internal and external rotation of the hip is negative bilaterally.  Myofascial exam: No tenderness to palpation to the lumbar paraspinous muscles.       Imaging:  MRI Lumbar Spine 11/8/2012  L1-L2: There is minimal posterior disc bulge extending less than 2 mm into the spinal canal. There is minimal thecal sac compression and no foraminal compromise.  L2-L3: There is minimal posterior disc bulge extending less than 2 mm into the spinal canal. There is  minimal thecal sac compression and no foraminal compromise.  L3-L4: There is a diffuse 3-mm posterior disc bulge. There is mild degenerative facet arthrosis with ligamentum flavum thickening. A combination of disc bulge and facet arthrosis results in moderate spinal canal stenosis with concentric compression of the thecal sac to an AP diameter of 8 mm and moderate bilateral foraminal stenosis.  L4-L5: There is a tear of the posterior disc annulus associated with a diffuse 3-mm posterior disc bulge. There is mild degenerative facet arthrosis with ligamentum flavum thickening. A combination of disc bulge and facet arthrosis results in moderate spinal canal stenosis with concentric compression of the thecal sac to an AP diameter of 8 mm in moderate bilateral foraminal stenosis.  L5-S1: There is minimal posterior disc bulge extending less than 2 mm into the spinal canal. There is minimal thecal sac compression and there is no foraminal compromise.    MRI L-spine 11/13/2008: At L1-2 mild broad-based disc bulge asymmetric to the left may contact exiting nerve root.  At L2/3 mild broad-based disc bulge more lateral to the left exiting nerve root on the left comes near the disc bulge.  At L3/4 broad-based disc bulge asymmetric to the left with facet hypertrophy and ligamentous hypertrophy causing mild central canal narrowing.  Moderate left foraminal stenosis.  At L4/5 there is facet arthropathy, broad-based disc bulge causing moderate bilateral foraminal stenosis and mild central canal narrowing.  Possible annular tear at that level.  This most comes near the exiting nerve root at that level.  At L5/S1 no significant central canal stenosis or disc bulging.    MRI lumbar spine 1/5/15  The lumbar vertebral bodies show normal height and signal intensity without evidence of acute compression fracture or pathologic marrow replacement process. Very few scattered incidentally observed vertebral body hemangiomas present throughout  the lumbar spine. There is a grade I retrolisthesis of L3 on L4. There is degenerative disc desiccation at every lumbar level with relative sparing of the L5-S1 intervertebral disc. The conus medullaris terminates at L1-L2. The visualized lower thoracic spinal cord is normal in signal intensity. The incidentally observed retroperitoneal soft tissues are unremarkable.  T12-L1: There is ligamentum flavum thickening. No central canal or neuroforaminal stenosis. No disc protrusion or extrusion.  L1-L2: There is an annular fissure present within the right paracentral portion of the intervertebral disc. No central canal or neuroforaminal stenosis. No disc protrusion or extrusion.  L2-L3: There is a broad disc bulge with a central annular tear. There is mild bilateral hypertrophic facet arthropathy. No central canal or neuroforaminal stenosis. No disc protrusion or extrusion.  L3-L4: There is a broad disc bulge, ligamentum flavum thickening, and hypertrophic facet arthropathy. There is an annular fissure present within the central intervertebral disc. There is mild-moderate central canal stenosis. There is moderate proximalleft neuroforaminal stenosis. There is mild right neuroforaminal stenosis.  L4-L5: There is a broad disc bulge with a central annular fissure/tear. There is ligamentum flavum thickening and hypertrophic facet arthropathy. There is, at most, mild overall central canal stenosis. No neuroforaminal stenosis.  L5-S1: There is bilateral facet arthropathy.    MRI lumbar spine 2/16/16  Vertebral body heights appear well-preserved. Vertebral body alignment appears adequate. Decreased T2 signal is noted at all lumbar levels consistent with degenerative disk desiccation. Postsurgical scarring is noted at L4 level with postsurgical partial laminectomy suspected at L4-L5 level. Spurring is noted at the SI joints bilaterally.  No STIR signal abnormality is noted to suggest an aggressive osseous process.  At the T12-L1  level, no significant disk bulge, central canal stenosis, or foraminal stenosis noted  At the L1-L2 level, mild broad-based bulging is noted towards the left neuroforamen more noticeable than right of approximately 3 mm. Mild left neural foraminal narrowing is noted with no significant right-sided neural foraminal narrowing or central canal narrowing.  At L2-L3 level, facet arthropathy and ligamentous hypertrophy is noted. Broad-based bulging is noted towards the left neuroforamen greater than right of 3 mm with increased T2 signal suggestive of an annular tear . Mild left neural foraminal narrowing greater than right neural foraminal narrowing is noted. No convincing detrimental change is noted since the prior. Minimal central canal narrowing is noted  At the L3-L4 level, mild ligamentous hypertrophy appears to be present. Broad-based bulging appears to be present towards each neuroforamen with moderate neural foraminal stenosis on the left greater than right. This may be slightly progressed on the right since the prior, less central canal narrowing appears to be present on the prior  At L4-L5 level, evidence of laminectomy is noted. Mild broad-based bulging is noted towards the paracentral left in particular of 3-4 mm. Scar tissue surrounds the right nerve foramen. Mild to moderate left neural foraminal narrowing is suspected mild right neural foraminal narrowing. Less central canal narrowing is noted than on the prior with minimal central canal narrowing suspected. The neural foraminal narrowing on the left may be slightly progressed since the prior  At L5-S1 level, no significant disk bulge, central canal stenosis, or nerve foraminal stenosis noted.      Assessment:  The patient is a 74-year-old man with a history of GERD, COPD, presents in referral from Dr. Paz for bilateral foot pain, low back pain.      1. Lumbar radiculopathy    2. DDD (degenerative disc disease), lumbar        Plan:  1.  I will schedule  the patient for a caudal epidural steroid injection. He has done very well with this in the past.  2.  Follow-up in 4 weeks postprocedure or sooner as needed.

## 2020-01-19 NOTE — H&P (VIEW-ONLY)
CC: Back pain    HPI: The patient is a 74-year-old man with a history of GERD, COPD, presents in referral from Dr. Eldridge for bilateral foot pain, low back pain. He is status post caudal epidural steroid injection on 12/19/18 with 100% relief lasting about 10 months.  His pain returned a couple months ago and is located in the left low back, left buttock, left groin and some soreness in the anterior thighs.  He describes his left buttock pain is tingling, burning and biting.  His pain is worse with activity and improved with rest.  He reports weakness in his legs and numbness in his feet.  He reports some bladder incontinence due to his prostate.  He denies bowel incontinence.    Pain intervention history: He has seen Dr. You for metatarsalgia with Redmond's neuroma, is considering surgical options.  Had some injections in the feet without any major relief.  He tried gabapentin but felt cognitively impaired with this. Left L4 transforaminal epidural steroid injection on 12/17/12 with 85% pain to back, 60% to left side of low back and bilateral foot pain about 10%. He returns in followup today he is status post left L4/5 transforaminal injection on 11/12/13 With about 6 months of relief.  He is status post L4/5 interlaminar epidural steroid injection on 5/19/14 with greater than 50% relief.  He is status post left L3 and L4 transforaminal epidural steroid injection on 3/9/16 with 40% relief.  He is status post left L3 and L4 transforaminal epidural steroid injection on 4/8/16 with 60% relief.  He is status post left GTB injection on 5/2/16 with moderate relief lasting a few weeks.  He is status post left L2, 3, 4 and 5 medial branch radiofrequency ablation on 7/8/16 with 100% relief of his left sided pain.  He is status post right L2, 3, 4 and 5 medial branch radiofrequency ablation on 9/28/16 with almost complete relief of his right low back pain.  He is status post left L2, 3, 4 and 5 medial branch radiofrequency  ablation on 8/9/17 with 100% relief of his arthritic pain.  He is status post left L2, 3, 4 and 5 medial branch radiofrequency ablation on 8/9/17 with 100% relief of his arthritic pain.  He is status post caudal epidural steroid injection on 10/04/2017 with almost 100% relief lasting about 9 months.  He is status post caudal epidural steroid injection on 12/19/18 with 100% relief lasting about 10 months.      ROS:He reports joint pain, back pain.  Balance of review of systems negative.    Medical, surgical, family and social history reviewed elsewhere in record.    Medications/Allergies: See med card      Vitals:    01/16/20 1045   BP: 132/77   Pulse: 67   Resp: 18   Temp: 98.7 °F (37.1 °C)   TempSrc: Oral   Weight: 86.7 kg (191 lb 4 oz)   PainSc:   6   PainLoc: Back         Physical exam:  Gen: A and O x3, pleasant, well-groomed  Skin: No rashes or obvious lesions  HEENT: PERRLA  CVS: Regular rate and rhythm, normal S1 and S2, no murmurs.  Resp: Clear to auscultation bilaterally, no wheezes or rales.  Musculoskeletal: Able to heel walk, toe walk. No antalgic gait.     Neuro:  Lower extremities: 5/5 strength bilaterally  Reflexes: Patellar 2+, Achilles 2+.  Sensory:  Intact and symmetrical to light touch and pinprick in L2-S1 dermatomes bilaterally.    Lumbar spine:  Lumbar spine: ROM is full with flexion and extension with increased left buttock pain during each maneuver.  Jimmy's test is negative bilaterally.  Supine straight leg raise is negative bilaterally.    Internal and external rotation of the hip is negative bilaterally.  Myofascial exam: No tenderness to palpation to the lumbar paraspinous muscles.       Imaging:  MRI Lumbar Spine 11/8/2012  L1-L2: There is minimal posterior disc bulge extending less than 2 mm into the spinal canal. There is minimal thecal sac compression and no foraminal compromise.  L2-L3: There is minimal posterior disc bulge extending less than 2 mm into the spinal canal. There is  minimal thecal sac compression and no foraminal compromise.  L3-L4: There is a diffuse 3-mm posterior disc bulge. There is mild degenerative facet arthrosis with ligamentum flavum thickening. A combination of disc bulge and facet arthrosis results in moderate spinal canal stenosis with concentric compression of the thecal sac to an AP diameter of 8 mm and moderate bilateral foraminal stenosis.  L4-L5: There is a tear of the posterior disc annulus associated with a diffuse 3-mm posterior disc bulge. There is mild degenerative facet arthrosis with ligamentum flavum thickening. A combination of disc bulge and facet arthrosis results in moderate spinal canal stenosis with concentric compression of the thecal sac to an AP diameter of 8 mm in moderate bilateral foraminal stenosis.  L5-S1: There is minimal posterior disc bulge extending less than 2 mm into the spinal canal. There is minimal thecal sac compression and there is no foraminal compromise.    MRI L-spine 11/13/2008: At L1-2 mild broad-based disc bulge asymmetric to the left may contact exiting nerve root.  At L2/3 mild broad-based disc bulge more lateral to the left exiting nerve root on the left comes near the disc bulge.  At L3/4 broad-based disc bulge asymmetric to the left with facet hypertrophy and ligamentous hypertrophy causing mild central canal narrowing.  Moderate left foraminal stenosis.  At L4/5 there is facet arthropathy, broad-based disc bulge causing moderate bilateral foraminal stenosis and mild central canal narrowing.  Possible annular tear at that level.  This most comes near the exiting nerve root at that level.  At L5/S1 no significant central canal stenosis or disc bulging.    MRI lumbar spine 1/5/15  The lumbar vertebral bodies show normal height and signal intensity without evidence of acute compression fracture or pathologic marrow replacement process. Very few scattered incidentally observed vertebral body hemangiomas present throughout  the lumbar spine. There is a grade I retrolisthesis of L3 on L4. There is degenerative disc desiccation at every lumbar level with relative sparing of the L5-S1 intervertebral disc. The conus medullaris terminates at L1-L2. The visualized lower thoracic spinal cord is normal in signal intensity. The incidentally observed retroperitoneal soft tissues are unremarkable.  T12-L1: There is ligamentum flavum thickening. No central canal or neuroforaminal stenosis. No disc protrusion or extrusion.  L1-L2: There is an annular fissure present within the right paracentral portion of the intervertebral disc. No central canal or neuroforaminal stenosis. No disc protrusion or extrusion.  L2-L3: There is a broad disc bulge with a central annular tear. There is mild bilateral hypertrophic facet arthropathy. No central canal or neuroforaminal stenosis. No disc protrusion or extrusion.  L3-L4: There is a broad disc bulge, ligamentum flavum thickening, and hypertrophic facet arthropathy. There is an annular fissure present within the central intervertebral disc. There is mild-moderate central canal stenosis. There is moderate proximalleft neuroforaminal stenosis. There is mild right neuroforaminal stenosis.  L4-L5: There is a broad disc bulge with a central annular fissure/tear. There is ligamentum flavum thickening and hypertrophic facet arthropathy. There is, at most, mild overall central canal stenosis. No neuroforaminal stenosis.  L5-S1: There is bilateral facet arthropathy.    MRI lumbar spine 2/16/16  Vertebral body heights appear well-preserved. Vertebral body alignment appears adequate. Decreased T2 signal is noted at all lumbar levels consistent with degenerative disk desiccation. Postsurgical scarring is noted at L4 level with postsurgical partial laminectomy suspected at L4-L5 level. Spurring is noted at the SI joints bilaterally.  No STIR signal abnormality is noted to suggest an aggressive osseous process.  At the T12-L1  level, no significant disk bulge, central canal stenosis, or foraminal stenosis noted  At the L1-L2 level, mild broad-based bulging is noted towards the left neuroforamen more noticeable than right of approximately 3 mm. Mild left neural foraminal narrowing is noted with no significant right-sided neural foraminal narrowing or central canal narrowing.  At L2-L3 level, facet arthropathy and ligamentous hypertrophy is noted. Broad-based bulging is noted towards the left neuroforamen greater than right of 3 mm with increased T2 signal suggestive of an annular tear . Mild left neural foraminal narrowing greater than right neural foraminal narrowing is noted. No convincing detrimental change is noted since the prior. Minimal central canal narrowing is noted  At the L3-L4 level, mild ligamentous hypertrophy appears to be present. Broad-based bulging appears to be present towards each neuroforamen with moderate neural foraminal stenosis on the left greater than right. This may be slightly progressed on the right since the prior, less central canal narrowing appears to be present on the prior  At L4-L5 level, evidence of laminectomy is noted. Mild broad-based bulging is noted towards the paracentral left in particular of 3-4 mm. Scar tissue surrounds the right nerve foramen. Mild to moderate left neural foraminal narrowing is suspected mild right neural foraminal narrowing. Less central canal narrowing is noted than on the prior with minimal central canal narrowing suspected. The neural foraminal narrowing on the left may be slightly progressed since the prior  At L5-S1 level, no significant disk bulge, central canal stenosis, or nerve foraminal stenosis noted.      Assessment:  The patient is a 74-year-old man with a history of GERD, COPD, presents in referral from Dr. Paz for bilateral foot pain, low back pain.      1. Lumbar radiculopathy    2. DDD (degenerative disc disease), lumbar        Plan:  1.  I will schedule  the patient for a caudal epidural steroid injection. He has done very well with this in the past.  2.  Follow-up in 4 weeks postprocedure or sooner as needed.

## 2020-01-22 ENCOUNTER — TELEPHONE (OUTPATIENT)
Dept: DERMATOLOGY | Facility: CLINIC | Age: 75
End: 2020-01-22

## 2020-01-22 NOTE — TELEPHONE ENCOUNTER
----- Message from Mary Villalta MD sent at 12/30/2019  9:53 AM CST -----  Please call pt and inform him biopsy came back consistent with a benign but irritated mole. No further treatment is needed at this time.    Thanks  TLP

## 2020-01-27 ENCOUNTER — HOSPITAL ENCOUNTER (OUTPATIENT)
Dept: RADIOLOGY | Facility: HOSPITAL | Age: 75
Discharge: HOME | End: 2020-01-27
Attending: ANESTHESIOLOGY | Admitting: ANESTHESIOLOGY
Payer: MEDICARE

## 2020-01-27 ENCOUNTER — HOSPITAL ENCOUNTER (OUTPATIENT)
Facility: HOSPITAL | Age: 75
Discharge: HOME | End: 2020-01-27
Attending: ANESTHESIOLOGY | Admitting: ANESTHESIOLOGY
Payer: MEDICARE

## 2020-01-27 VITALS
HEART RATE: 68 BPM | SYSTOLIC BLOOD PRESSURE: 134 MMHG | RESPIRATION RATE: 16 BRPM | DIASTOLIC BLOOD PRESSURE: 74 MMHG | WEIGHT: 182 LBS | OXYGEN SATURATION: 100 % | TEMPERATURE: 98 F | HEIGHT: 68 IN | BODY MASS INDEX: 27.58 KG/M2

## 2020-01-27 DIAGNOSIS — M51.36 DDD (DEGENERATIVE DISC DISEASE), LUMBAR: ICD-10-CM

## 2020-01-27 DIAGNOSIS — M54.16 LUMBAR RADICULOPATHY: Primary | ICD-10-CM

## 2020-01-27 PROCEDURE — 25000003 PHARM REV CODE 250: Mod: HCNC,PO | Performed by: ANESTHESIOLOGY

## 2020-01-27 PROCEDURE — 62323 NJX INTERLAMINAR LMBR/SAC: CPT | Mod: HCNC,PO | Performed by: ANESTHESIOLOGY

## 2020-01-27 PROCEDURE — 62323 PR INJ LUMBAR/SACRAL, W/IMAGING GUIDANCE: ICD-10-PCS | Mod: HCNC,,, | Performed by: ANESTHESIOLOGY

## 2020-01-27 PROCEDURE — 76000 FLUOROSCOPY <1 HR PHYS/QHP: CPT | Mod: TC,HCNC,PO

## 2020-01-27 PROCEDURE — 63600175 PHARM REV CODE 636 W HCPCS: Mod: HCNC,PO | Performed by: ANESTHESIOLOGY

## 2020-01-27 PROCEDURE — 99152 PR MOD CONSCIOUS SEDATION, SAME PHYS, 5+ YRS, FIRST 15 MIN: ICD-10-PCS | Mod: HCNC,,, | Performed by: ANESTHESIOLOGY

## 2020-01-27 PROCEDURE — 62323 NJX INTERLAMINAR LMBR/SAC: CPT | Mod: HCNC,,, | Performed by: ANESTHESIOLOGY

## 2020-01-27 PROCEDURE — 62321 NJX INTERLAMINAR CRV/THRC: CPT | Mod: HCNC,PO | Performed by: ANESTHESIOLOGY

## 2020-01-27 PROCEDURE — 99152 MOD SED SAME PHYS/QHP 5/>YRS: CPT | Mod: HCNC,,, | Performed by: ANESTHESIOLOGY

## 2020-01-27 PROCEDURE — 25500020 PHARM REV CODE 255: Mod: HCNC,PO | Performed by: ANESTHESIOLOGY

## 2020-01-27 PROCEDURE — A4216 STERILE WATER/SALINE, 10 ML: HCPCS | Mod: HCNC,PO | Performed by: ANESTHESIOLOGY

## 2020-01-27 RX ORDER — METHYLPREDNISOLONE ACETATE 80 MG/ML
INJECTION, SUSPENSION INTRA-ARTICULAR; INTRALESIONAL; INTRAMUSCULAR; SOFT TISSUE
Status: DISCONTINUED | OUTPATIENT
Start: 2020-01-27 | End: 2020-01-27 | Stop reason: HOSPADM

## 2020-01-27 RX ORDER — SODIUM CHLORIDE, SODIUM LACTATE, POTASSIUM CHLORIDE, CALCIUM CHLORIDE 600; 310; 30; 20 MG/100ML; MG/100ML; MG/100ML; MG/100ML
INJECTION, SOLUTION INTRAVENOUS CONTINUOUS
Status: DISCONTINUED | OUTPATIENT
Start: 2020-01-27 | End: 2020-01-27 | Stop reason: HOSPADM

## 2020-01-27 RX ORDER — SODIUM CHLORIDE 9 MG/ML
INJECTION, SOLUTION INTRAMUSCULAR; INTRAVENOUS; SUBCUTANEOUS
Status: DISCONTINUED | OUTPATIENT
Start: 2020-01-27 | End: 2020-01-27 | Stop reason: HOSPADM

## 2020-01-27 RX ORDER — LIDOCAINE HYDROCHLORIDE 10 MG/ML
1 INJECTION INFILTRATION; PERINEURAL ONCE
Status: COMPLETED | OUTPATIENT
Start: 2020-01-27 | End: 2020-01-27

## 2020-01-27 RX ORDER — LIDOCAINE HYDROCHLORIDE 10 MG/ML
INJECTION, SOLUTION EPIDURAL; INFILTRATION; INTRACAUDAL; PERINEURAL
Status: DISCONTINUED | OUTPATIENT
Start: 2020-01-27 | End: 2020-01-27 | Stop reason: HOSPADM

## 2020-01-27 RX ORDER — MIDAZOLAM HYDROCHLORIDE 2 MG/2ML
INJECTION, SOLUTION INTRAMUSCULAR; INTRAVENOUS
Status: DISCONTINUED | OUTPATIENT
Start: 2020-01-27 | End: 2020-01-27 | Stop reason: HOSPADM

## 2020-01-27 RX ADMIN — SODIUM CHLORIDE, SODIUM LACTATE, POTASSIUM CHLORIDE, AND CALCIUM CHLORIDE: .6; .31; .03; .02 INJECTION, SOLUTION INTRAVENOUS at 02:01

## 2020-01-27 RX ADMIN — LIDOCAINE HYDROCHLORIDE: 10 INJECTION, SOLUTION EPIDURAL; INFILTRATION; INTRACAUDAL; PERINEURAL at 02:01

## 2020-01-27 NOTE — OP NOTE
PROCEDURE DATE: 1/27/2020    PROCEDURE:  Caudal epidural steroid injection under fluoroscopy.    Diagnosis: Lumbar radiculopathy    Post Op diagnosis: Same    PHYSICIAN: Josafat Kahn M.D.    MEDICATIONS INJECTED:  80 mg of methylprednisone and 4 ml of sterile, preservative-free NaCl.    LOCAL ANESTHETIC GIVEN:  Lidocaine 1%, 4 ml total    SEDATION MEDICATIONS: 4mg versed    ESTIMATED BLOOD LOSS:  none    COMPLICATIONS:  none    TECHNIQUE:   After the patient was placed in prone position, the patient was prepped and draped in the usual sterile fashion using ChloraPrep and sterile towels.  Appropriate anatomic landmarks were determined by identifying the sacral hiatus in the lateral fluoroscopic view.  Local anesthetic was given via a 25g 1.5 inch needle by raising a wheal and infiltrating down to the periosteum.  A 3.5 inch 22 gauge needle was introduced thru the sacral hiatus.  Omnipaque was injected to confirm placement in the appropriate area and that there was no vascular uptake.  The medication was then injected slowly.  The patient tolerated the procedure well.    The patient was monitored after the procedure.  Patient was given post procedure and discharge instructions to follow at home.  The patient was discharged in a stable condition    Event Time In Time Out   In Facility [id number]    In Pre-Procedure 1408    Physician Available     Anesthesia Available     Pre-Op: Bedside Procedure Start     Pre-Op: Bedside Procedure Stop     Pre-Procedure Complete 1432    Out of Pre-Procedure     Anesthesia Start     Anesthesia Start Data Collection     Setup Start     Setup Complete     In Room 1513    Prep Start     Procedure Prep Complete     Procedure Start 1533    Procedure Closing     Emergence     Procedure Finish 1537    Sedation Start 1520    Scope In     Extent Reached     Scope Out     Sedation End 1539    Out of Room 1539    Cleanup Start     Cleanup Complete     Cosmetic Start     Cosmetic Stop     Pain Mgmt  In Room     Pain Mgmt Out Room     In Recovery     Anesthesia Finish     Bedside Procedure Start     Bedside Procedure Stop     Recovery Care Complete     Out of Recovery     To Phase II     In Phase II     Pain Mgmt Recovery Start     Pain Mgmt Recovery Stop     Obs Rec Start     Obs Rec Stop     Phase II Care Complete     Out of Phase II     Procedural Care Complete     Discharge     Pain Follow Up Needed     Pain Follow Up Complete

## 2020-01-28 ENCOUNTER — CLINICAL SUPPORT (OUTPATIENT)
Dept: CARDIOLOGY | Facility: CLINIC | Age: 75
End: 2020-01-28
Attending: INTERNAL MEDICINE
Payer: MEDICARE

## 2020-01-28 VITALS — HEIGHT: 68 IN | HEART RATE: 75 BPM | WEIGHT: 182 LBS | BODY MASS INDEX: 27.58 KG/M2

## 2020-01-28 DIAGNOSIS — I70.0 ATHEROSCLEROSIS OF AORTA: ICD-10-CM

## 2020-01-28 DIAGNOSIS — E78.2 MIXED HYPERLIPIDEMIA: ICD-10-CM

## 2020-01-28 DIAGNOSIS — I77.1 TORTUOUS AORTA: ICD-10-CM

## 2020-01-28 DIAGNOSIS — Z86.79 HISTORY OF ATRIAL FLUTTER: ICD-10-CM

## 2020-01-28 DIAGNOSIS — Z86.79 HISTORY OF ATRIAL FIBRILLATION: ICD-10-CM

## 2020-01-28 LAB
ASCENDING AORTA: 2.86 CM
AV INDEX (PROSTH): 0.72
AV MEAN GRADIENT: 3 MMHG
AV PEAK GRADIENT: 6 MMHG
AV VALVE AREA: 2.97 CM2
AV VELOCITY RATIO: 0.78
BSA FOR ECHO PROCEDURE: 1.99 M2
CV ECHO LV RWT: 0.51 CM
DOP CALC AO PEAK VEL: 1.24 M/S
DOP CALC AO VTI: 29.41 CM
DOP CALC LVOT AREA: 4.1 CM2
DOP CALC LVOT DIAMETER: 2.29 CM
DOP CALC LVOT PEAK VEL: 0.97 M/S
DOP CALC LVOT STROKE VOLUME: 87.48 CM3
DOP CALCLVOT PEAK VEL VTI: 21.25 CM
E WAVE DECELERATION TIME: 192.87 MSEC
E/A RATIO: 0.59
E/E' RATIO: 11.4 M/S
ECHO LV POSTERIOR WALL: 1.13 CM (ref 0.6–1.1)
FRACTIONAL SHORTENING: 41 % (ref 28–44)
INTERVENTRICULAR SEPTUM: 1.14 CM (ref 0.6–1.1)
IVRT: 0.12 MSEC
LA MAJOR: 5.25 CM
LA MINOR: 4.59 CM
LA WIDTH: 2.83 CM
LEFT ATRIUM SIZE: 3.33 CM
LEFT ATRIUM VOLUME INDEX: 20 ML/M2
LEFT ATRIUM VOLUME: 39.23 CM3
LEFT INTERNAL DIMENSION IN SYSTOLE: 2.57 CM (ref 2.1–4)
LEFT VENTRICLE DIASTOLIC VOLUME INDEX: 44.4 ML/M2
LEFT VENTRICLE DIASTOLIC VOLUME: 87.18 ML
LEFT VENTRICLE MASS INDEX: 90 G/M2
LEFT VENTRICLE SYSTOLIC VOLUME INDEX: 12.2 ML/M2
LEFT VENTRICLE SYSTOLIC VOLUME: 23.92 ML
LEFT VENTRICULAR INTERNAL DIMENSION IN DIASTOLE: 4.39 CM (ref 3.5–6)
LEFT VENTRICULAR MASS: 175.99 G
LV LATERAL E/E' RATIO: 11.4 M/S
LV SEPTAL E/E' RATIO: 11.4 M/S
MV PEAK A VEL: 0.97 M/S
MV PEAK E VEL: 0.57 M/S
PISA TR MAX VEL: 2.54 M/S
PULM VEIN S/D RATIO: 1.51
PV PEAK D VEL: 0.35 M/S
PV PEAK S VEL: 0.53 M/S
RA MAJOR: 5.03 CM
RA PRESSURE: 3 MMHG
RA WIDTH: 4.78 CM
RIGHT VENTRICULAR END-DIASTOLIC DIMENSION: 2.87 CM
SINUS: 3.11 CM
STJ: 2.93 CM
TDI LATERAL: 0.05 M/S
TDI SEPTAL: 0.05 M/S
TDI: 0.05 M/S
TR MAX PG: 26 MMHG
TRICUSPID ANNULAR PLANE SYSTOLIC EXCURSION: 2.74 CM
TV REST PULMONARY ARTERY PRESSURE: 29 MMHG

## 2020-01-28 PROCEDURE — 99999 PR PBB SHADOW E&M-EST. PATIENT-LVL I: ICD-10-PCS | Mod: PBBFAC,HCNC,,

## 2020-01-28 PROCEDURE — 93306 TTE W/DOPPLER COMPLETE: CPT | Mod: HCNC,S$GLB,, | Performed by: INTERNAL MEDICINE

## 2020-01-28 PROCEDURE — 99999 PR PBB SHADOW E&M-EST. PATIENT-LVL I: CPT | Mod: PBBFAC,HCNC,,

## 2020-01-28 PROCEDURE — 93306 ECHO (CUPID ONLY): ICD-10-PCS | Mod: HCNC,S$GLB,, | Performed by: INTERNAL MEDICINE

## 2020-02-24 ENCOUNTER — OFFICE VISIT (OUTPATIENT)
Dept: PAIN MEDICINE | Facility: CLINIC | Age: 75
End: 2020-02-24
Payer: MEDICARE

## 2020-02-24 ENCOUNTER — LAB VISIT (OUTPATIENT)
Dept: LAB | Facility: HOSPITAL | Age: 75
End: 2020-02-24
Attending: NURSE PRACTITIONER
Payer: MEDICARE

## 2020-02-24 VITALS
DIASTOLIC BLOOD PRESSURE: 72 MMHG | TEMPERATURE: 98 F | HEART RATE: 82 BPM | SYSTOLIC BLOOD PRESSURE: 129 MMHG | RESPIRATION RATE: 18 BRPM | OXYGEN SATURATION: 97 % | WEIGHT: 197.19 LBS | BODY MASS INDEX: 29.98 KG/M2

## 2020-02-24 DIAGNOSIS — Z00.00 ROUTINE ADULT HEALTH MAINTENANCE: ICD-10-CM

## 2020-02-24 DIAGNOSIS — M54.16 LUMBAR RADICULITIS: ICD-10-CM

## 2020-02-24 DIAGNOSIS — G89.4 CHRONIC PAIN SYNDROME: Primary | ICD-10-CM

## 2020-02-24 DIAGNOSIS — M47.816 LUMBAR SPONDYLOSIS: ICD-10-CM

## 2020-02-24 DIAGNOSIS — M51.36 DDD (DEGENERATIVE DISC DISEASE), LUMBAR: ICD-10-CM

## 2020-02-24 LAB
CREAT SERPL-MCNC: 0.9 MG/DL (ref 0.5–1.4)
EST. GFR  (AFRICAN AMERICAN): >60 ML/MIN/1.73 M^2
EST. GFR  (NON AFRICAN AMERICAN): >60 ML/MIN/1.73 M^2

## 2020-02-24 PROCEDURE — 36415 COLL VENOUS BLD VENIPUNCTURE: CPT | Mod: HCNC,PO

## 2020-02-24 PROCEDURE — 1101F PT FALLS ASSESS-DOCD LE1/YR: CPT | Mod: HCNC,CPTII,S$GLB, | Performed by: PHYSICIAN ASSISTANT

## 2020-02-24 PROCEDURE — 82565 ASSAY OF CREATININE: CPT | Mod: HCNC

## 2020-02-24 PROCEDURE — 99999 PR PBB SHADOW E&M-EST. PATIENT-LVL IV: CPT | Mod: PBBFAC,HCNC,, | Performed by: PHYSICIAN ASSISTANT

## 2020-02-24 PROCEDURE — 99214 OFFICE O/P EST MOD 30 MIN: CPT | Mod: HCNC,S$GLB,, | Performed by: PHYSICIAN ASSISTANT

## 2020-02-24 PROCEDURE — 1101F PR PT FALLS ASSESS DOC 0-1 FALLS W/OUT INJ PAST YR: ICD-10-PCS | Mod: HCNC,CPTII,S$GLB, | Performed by: PHYSICIAN ASSISTANT

## 2020-02-24 PROCEDURE — 1125F PR PAIN SEVERITY QUANTIFIED, PAIN PRESENT: ICD-10-PCS | Mod: HCNC,S$GLB,, | Performed by: PHYSICIAN ASSISTANT

## 2020-02-24 PROCEDURE — 1159F MED LIST DOCD IN RCRD: CPT | Mod: HCNC,S$GLB,, | Performed by: PHYSICIAN ASSISTANT

## 2020-02-24 PROCEDURE — 1159F PR MEDICATION LIST DOCUMENTED IN MEDICAL RECORD: ICD-10-PCS | Mod: HCNC,S$GLB,, | Performed by: PHYSICIAN ASSISTANT

## 2020-02-24 PROCEDURE — 99214 PR OFFICE/OUTPT VISIT, EST, LEVL IV, 30-39 MIN: ICD-10-PCS | Mod: HCNC,S$GLB,, | Performed by: PHYSICIAN ASSISTANT

## 2020-02-24 PROCEDURE — 99999 PR PBB SHADOW E&M-EST. PATIENT-LVL IV: ICD-10-PCS | Mod: PBBFAC,HCNC,, | Performed by: PHYSICIAN ASSISTANT

## 2020-02-24 PROCEDURE — 1125F AMNT PAIN NOTED PAIN PRSNT: CPT | Mod: HCNC,S$GLB,, | Performed by: PHYSICIAN ASSISTANT

## 2020-02-25 ENCOUNTER — TELEPHONE (OUTPATIENT)
Dept: PAIN MEDICINE | Facility: CLINIC | Age: 75
End: 2020-02-25

## 2020-02-25 NOTE — TELEPHONE ENCOUNTER
Please let the patient know that his creatinine level is within normal limits and it is ok for him to get the MRI with contrast.

## 2020-02-25 NOTE — PROGRESS NOTES
CC: Back pain    HPI: The patient is a 74-year-old man with a history of GERD, COPD, presents in referral from Dr. Eldridge for bilateral foot pain, low back pain. He is status post caudal epidural steroid injection on 01/27/2020 with 90% relief of his back pain.  However, he continues to have fairly severe left lateral buttock pain and most significantly bilateral foot pain. He describes his foot pain is hot and burning.  It is worse with yard work and prolonged walking.  He experiences numbness in his feet with prolonged walking.  He denies weakness or incontinence.    Pain intervention history: He has seen Dr. You for metatarsalgia with Redmond's neuroma, is considering surgical options.  Had some injections in the feet without any major relief.  He tried gabapentin but felt cognitively impaired with this. Left L4 transforaminal epidural steroid injection on 12/17/12 with 85% pain to back, 60% to left side of low back and bilateral foot pain about 10%. He returns in followup today he is status post left L4/5 transforaminal injection on 11/12/13 With about 6 months of relief.  He is status post L4/5 interlaminar epidural steroid injection on 5/19/14 with greater than 50% relief.  He is status post left L3 and L4 transforaminal epidural steroid injection on 3/9/16 with 40% relief.  He is status post left L3 and L4 transforaminal epidural steroid injection on 4/8/16 with 60% relief.  He is status post left GTB injection on 5/2/16 with moderate relief lasting a few weeks.  He is status post left L2, 3, 4 and 5 medial branch radiofrequency ablation on 7/8/16 with 100% relief of his left sided pain.  He is status post right L2, 3, 4 and 5 medial branch radiofrequency ablation on 9/28/16 with almost complete relief of his right low back pain.  He is status post left L2, 3, 4 and 5 medial branch radiofrequency ablation on 8/9/17 with 100% relief of his arthritic pain.  He is status post left L2, 3, 4 and 5 medial branch  radiofrequency ablation on 8/9/17 with 100% relief of his arthritic pain.  He is status post caudal epidural steroid injection on 10/04/2017 with almost 100% relief lasting about 9 months.  He is status post caudal epidural steroid injection on 12/19/18 with 100% relief lasting about 10 months.   He is status post caudal epidural steroid injection on 01/27/2020 with 90% relief of his back pain.    ROS:He reports joint pain, back pain.  Balance of review of systems negative.    Medical, surgical, family and social history reviewed elsewhere in record.    Medications/Allergies: See med card      Vitals:    02/24/20 0854   BP: 129/72   Pulse: 82   Resp: 18   Temp: 97.6 °F (36.4 °C)   SpO2: 97%   Weight: 89.4 kg (197 lb 3.2 oz)   PainSc:   6   PainLoc: Leg         Physical exam:  Gen: A and O x3, pleasant, well-groomed  Skin: No rashes or obvious lesions  HEENT: PERRLA  CVS: Regular rate and rhythm, normal S1 and S2, no murmurs.  Resp: Clear to auscultation bilaterally, no wheezes or rales.  Musculoskeletal: Able to heel walk, toe walk. No antalgic gait.     Neuro:  Lower extremities: 5/5 strength bilaterally  Reflexes: Patellar 2+, Achilles 2+.  Sensory:  Intact and symmetrical to light touch and pinprick in L2-S1 dermatomes bilaterally.    Lumbar spine:  Lumbar spine: ROM is full with flexion and extension with increased left buttock pain during each maneuver.  Jimmy's test is negative bilaterally.  Supine straight leg raise is negative bilaterally.    Internal and external rotation of the hip is negative bilaterally.  Myofascial exam: No tenderness to palpation to the lumbar paraspinous muscles.       Imaging:  MRI Lumbar Spine 11/8/2012  L1-L2: There is minimal posterior disc bulge extending less than 2 mm into the spinal canal. There is minimal thecal sac compression and no foraminal compromise.  L2-L3: There is minimal posterior disc bulge extending less than 2 mm into the spinal canal. There is minimal thecal  sac compression and no foraminal compromise.  L3-L4: There is a diffuse 3-mm posterior disc bulge. There is mild degenerative facet arthrosis with ligamentum flavum thickening. A combination of disc bulge and facet arthrosis results in moderate spinal canal stenosis with concentric compression of the thecal sac to an AP diameter of 8 mm and moderate bilateral foraminal stenosis.  L4-L5: There is a tear of the posterior disc annulus associated with a diffuse 3-mm posterior disc bulge. There is mild degenerative facet arthrosis with ligamentum flavum thickening. A combination of disc bulge and facet arthrosis results in moderate spinal canal stenosis with concentric compression of the thecal sac to an AP diameter of 8 mm in moderate bilateral foraminal stenosis.  L5-S1: There is minimal posterior disc bulge extending less than 2 mm into the spinal canal. There is minimal thecal sac compression and there is no foraminal compromise.    MRI L-spine 11/13/2008: At L1-2 mild broad-based disc bulge asymmetric to the left may contact exiting nerve root.  At L2/3 mild broad-based disc bulge more lateral to the left exiting nerve root on the left comes near the disc bulge.  At L3/4 broad-based disc bulge asymmetric to the left with facet hypertrophy and ligamentous hypertrophy causing mild central canal narrowing.  Moderate left foraminal stenosis.  At L4/5 there is facet arthropathy, broad-based disc bulge causing moderate bilateral foraminal stenosis and mild central canal narrowing.  Possible annular tear at that level.  This most comes near the exiting nerve root at that level.  At L5/S1 no significant central canal stenosis or disc bulging.    MRI lumbar spine 1/5/15  The lumbar vertebral bodies show normal height and signal intensity without evidence of acute compression fracture or pathologic marrow replacement process. Very few scattered incidentally observed vertebral body hemangiomas present throughout the lumbar  spine. There is a grade I retrolisthesis of L3 on L4. There is degenerative disc desiccation at every lumbar level with relative sparing of the L5-S1 intervertebral disc. The conus medullaris terminates at L1-L2. The visualized lower thoracic spinal cord is normal in signal intensity. The incidentally observed retroperitoneal soft tissues are unremarkable.  T12-L1: There is ligamentum flavum thickening. No central canal or neuroforaminal stenosis. No disc protrusion or extrusion.  L1-L2: There is an annular fissure present within the right paracentral portion of the intervertebral disc. No central canal or neuroforaminal stenosis. No disc protrusion or extrusion.  L2-L3: There is a broad disc bulge with a central annular tear. There is mild bilateral hypertrophic facet arthropathy. No central canal or neuroforaminal stenosis. No disc protrusion or extrusion.  L3-L4: There is a broad disc bulge, ligamentum flavum thickening, and hypertrophic facet arthropathy. There is an annular fissure present within the central intervertebral disc. There is mild-moderate central canal stenosis. There is moderate proximalleft neuroforaminal stenosis. There is mild right neuroforaminal stenosis.  L4-L5: There is a broad disc bulge with a central annular fissure/tear. There is ligamentum flavum thickening and hypertrophic facet arthropathy. There is, at most, mild overall central canal stenosis. No neuroforaminal stenosis.  L5-S1: There is bilateral facet arthropathy.    MRI lumbar spine 2/16/16  Vertebral body heights appear well-preserved. Vertebral body alignment appears adequate. Decreased T2 signal is noted at all lumbar levels consistent with degenerative disk desiccation. Postsurgical scarring is noted at L4 level with postsurgical partial laminectomy suspected at L4-L5 level. Spurring is noted at the SI joints bilaterally.  No STIR signal abnormality is noted to suggest an aggressive osseous process.  At the T12-L1 level, no  significant disk bulge, central canal stenosis, or foraminal stenosis noted  At the L1-L2 level, mild broad-based bulging is noted towards the left neuroforamen more noticeable than right of approximately 3 mm. Mild left neural foraminal narrowing is noted with no significant right-sided neural foraminal narrowing or central canal narrowing.  At L2-L3 level, facet arthropathy and ligamentous hypertrophy is noted. Broad-based bulging is noted towards the left neuroforamen greater than right of 3 mm with increased T2 signal suggestive of an annular tear . Mild left neural foraminal narrowing greater than right neural foraminal narrowing is noted. No convincing detrimental change is noted since the prior. Minimal central canal narrowing is noted  At the L3-L4 level, mild ligamentous hypertrophy appears to be present. Broad-based bulging appears to be present towards each neuroforamen with moderate neural foraminal stenosis on the left greater than right. This may be slightly progressed on the right since the prior, less central canal narrowing appears to be present on the prior  At L4-L5 level, evidence of laminectomy is noted. Mild broad-based bulging is noted towards the paracentral left in particular of 3-4 mm. Scar tissue surrounds the right nerve foramen. Mild to moderate left neural foraminal narrowing is suspected mild right neural foraminal narrowing. Less central canal narrowing is noted than on the prior with minimal central canal narrowing suspected. The neural foraminal narrowing on the left may be slightly progressed since the prior  At L5-S1 level, no significant disk bulge, central canal stenosis, or nerve foraminal stenosis noted.      Assessment:  The patient is a 74-year-old man with a history of GERD, COPD, presents in referral from Dr. Paz for bilateral foot pain, low back pain.      1. Chronic pain syndrome    2. Lumbar radiculitis    3. DDD (degenerative disc disease), lumbar    4. Lumbar  spondylosis    5. Routine adult health maintenance        Plan:  1.  The patient had significant relief his back pain following the caudal VANESSA but continues to have fairly severe left lateral buttock pain and severe bilateral foot pain.  I am going to update his lumbar spine MRI for further evaluation.  If there is nothing surgical, we may consider spinal cord stimulation and I have given him information from [employer].  We will call him with the MRI results and with recommendations.  If we schedule a trial I will order a back brace with lateral support and we discussed all the risks involved.    Greater than 50% of this 30 minutes visit was spent counseling the patient.

## 2020-03-17 ENCOUNTER — TELEPHONE (OUTPATIENT)
Dept: FAMILY MEDICINE | Facility: CLINIC | Age: 75
End: 2020-03-17

## 2020-03-17 DIAGNOSIS — J32.9 BACTERIAL SINUSITIS: Primary | ICD-10-CM

## 2020-03-17 DIAGNOSIS — B96.89 BACTERIAL SINUSITIS: Primary | ICD-10-CM

## 2020-03-17 RX ORDER — AZITHROMYCIN 250 MG/1
TABLET, FILM COATED ORAL
Qty: 6 TABLET | Refills: 0 | Status: ON HOLD | OUTPATIENT
Start: 2020-03-17 | End: 2020-07-07 | Stop reason: CLARIF

## 2020-03-17 NOTE — TELEPHONE ENCOUNTER
Please advise.       ----- Message from Mary Rosales sent at 3/17/2020  2:44 PM CDT -----  Contact: Patient  Type: Needs Medical Advice    Who Called:  Patient  Symptoms (please be specific):  Sniffles, runny nose  How long has patient had these symptoms:  Month and a half  Pharmacy name and phone #:    [facility] [address] [place] [address] - [address] [facility]  [address] [facility] [address]  Phone: [phone] Fax: [phone]  Best Call Back Number: [phone]  Additional Information: Calling to find out if there is something he can get called in, he advised he would rather not come in to the office if he does not have to. Has had the sniffles and runny nose for over a month and over the counter medications are not helping.

## 2020-04-22 ENCOUNTER — TELEPHONE (OUTPATIENT)
Dept: FAMILY MEDICINE | Facility: CLINIC | Age: 75
End: 2020-04-22

## 2020-04-22 NOTE — TELEPHONE ENCOUNTER
----- Message from Jacqueline Archibald sent at 4/21/2020  2:53 PM CDT -----  Contact: patient  Type: Needs Medical Advice  Who Called:  patient  Best Call Back Number: [phone]  Additional Information: patient states he would like to discuss a medication that prevents COVID-19 .  Patient does not have virus just has questions.  Please call to advise.  Thanks!

## 2020-04-22 NOTE — TELEPHONE ENCOUNTER
"Spoke with pt, addressed concerns, pt reports "sore throat". Advised pt to self quarintine to reduce risk of exposure, wash hands frequently and report progressing symptoms ASAP, pt verbalized understand and MY Chart code sent to email  "

## 2020-05-13 ENCOUNTER — PES CALL (OUTPATIENT)
Dept: ADMINISTRATIVE | Facility: CLINIC | Age: 75
End: 2020-05-13

## 2020-05-21 ENCOUNTER — TELEPHONE (OUTPATIENT)
Dept: PAIN MEDICINE | Facility: CLINIC | Age: 75
End: 2020-05-21

## 2020-05-21 NOTE — TELEPHONE ENCOUNTER
----- Message from Alison Krause sent at 5/21/2020  4:33 PM CDT -----  Contact: pt  Type:  Patient Returning Call    Who Called:  pt  Who Left Message for Patient:  Not sure  Does the patient know what this is regarding?:  MRI  Best Call Back Number:  [phone] (home)     Additional Information:  na

## 2020-06-05 ENCOUNTER — HOSPITAL ENCOUNTER (OUTPATIENT)
Dept: RADIOLOGY | Facility: HOSPITAL | Age: 75
Discharge: HOME | End: 2020-06-05
Attending: PHYSICIAN ASSISTANT
Payer: MEDICARE

## 2020-06-05 DIAGNOSIS — M51.36 DDD (DEGENERATIVE DISC DISEASE), LUMBAR: ICD-10-CM

## 2020-06-05 PROCEDURE — 72158 MRI LUMBAR SPINE W/O & W/DYE: CPT | Mod: 26,HCNC,, | Performed by: RADIOLOGY

## 2020-06-05 PROCEDURE — 25500020 PHARM REV CODE 255: Mod: HCNC,PO | Performed by: PHYSICIAN ASSISTANT

## 2020-06-05 PROCEDURE — A9585 GADOBUTROL INJECTION: HCPCS | Mod: HCNC,PO | Performed by: PHYSICIAN ASSISTANT

## 2020-06-05 PROCEDURE — 72158 MRI LUMBAR SPINE W/O & W/DYE: CPT | Mod: TC,HCNC,PO

## 2020-06-05 PROCEDURE — 72158 MRI LUMBAR SPINE W WO CONTRAST: ICD-10-PCS | Mod: 26,HCNC,, | Performed by: RADIOLOGY

## 2020-06-05 RX ORDER — GADOBUTROL 604.72 MG/ML
10 INJECTION INTRAVENOUS
Status: COMPLETED | OUTPATIENT
Start: 2020-06-05 | End: 2020-06-05

## 2020-06-05 RX ADMIN — GADOBUTROL 10 ML: 604.72 INJECTION INTRAVENOUS at 03:06

## 2020-06-09 ENCOUNTER — TELEPHONE (OUTPATIENT)
Dept: PAIN MEDICINE | Facility: CLINIC | Age: 75
End: 2020-06-09

## 2020-06-09 DIAGNOSIS — M43.16 SPONDYLOLISTHESIS OF LUMBAR REGION: Primary | ICD-10-CM

## 2020-06-09 NOTE — TELEPHONE ENCOUNTER
Please let the patient know that I reviewed his lumbar spine MRI and he does have some progression of his spondylolisthesis of L4 on L5 which is a shift of the vertebra.  Before we proceed with spinal cord stimulation I would like to obtain flexion/extension x-rays to check for instability and have him see Neurosurgery for an opinion.

## 2020-06-15 ENCOUNTER — PATIENT OUTREACH (OUTPATIENT)
Dept: ADMINISTRATIVE | Facility: OTHER | Age: 75
End: 2020-06-15

## 2020-06-16 ENCOUNTER — HOSPITAL ENCOUNTER (OUTPATIENT)
Dept: RADIOLOGY | Facility: HOSPITAL | Age: 75
Discharge: HOME | End: 2020-06-16
Attending: PHYSICIAN ASSISTANT
Payer: MEDICARE

## 2020-06-16 ENCOUNTER — OFFICE VISIT (OUTPATIENT)
Dept: NEUROSURGERY | Facility: CLINIC | Age: 75
End: 2020-06-16
Payer: MEDICARE

## 2020-06-16 VITALS
HEART RATE: 79 BPM | BODY MASS INDEX: 30.21 KG/M2 | WEIGHT: 199.31 LBS | SYSTOLIC BLOOD PRESSURE: 131 MMHG | HEIGHT: 68 IN | DIASTOLIC BLOOD PRESSURE: 83 MMHG

## 2020-06-16 DIAGNOSIS — M43.16 SPONDYLOLISTHESIS OF LUMBAR REGION: ICD-10-CM

## 2020-06-16 PROCEDURE — 99999 PR PBB SHADOW E&M-EST. PATIENT-LVL III: CPT | Mod: PBBFAC,HCNC,, | Performed by: NEUROLOGICAL SURGERY

## 2020-06-16 PROCEDURE — 1125F PR PAIN SEVERITY QUANTIFIED, PAIN PRESENT: ICD-10-PCS | Mod: HCNC,S$GLB,, | Performed by: NEUROLOGICAL SURGERY

## 2020-06-16 PROCEDURE — 72110 XR LUMBAR SPINE 5 VIEW WITH FLEX AND EXT: ICD-10-PCS | Mod: 26,HCNC,, | Performed by: RADIOLOGY

## 2020-06-16 PROCEDURE — 99204 OFFICE O/P NEW MOD 45 MIN: CPT | Mod: HCNC,S$GLB,, | Performed by: NEUROLOGICAL SURGERY

## 2020-06-16 PROCEDURE — 72110 X-RAY EXAM L-2 SPINE 4/>VWS: CPT | Mod: 26,HCNC,, | Performed by: RADIOLOGY

## 2020-06-16 PROCEDURE — 1101F PR PT FALLS ASSESS DOC 0-1 FALLS W/OUT INJ PAST YR: ICD-10-PCS | Mod: HCNC,CPTII,S$GLB, | Performed by: NEUROLOGICAL SURGERY

## 2020-06-16 PROCEDURE — 1101F PT FALLS ASSESS-DOCD LE1/YR: CPT | Mod: HCNC,CPTII,S$GLB, | Performed by: NEUROLOGICAL SURGERY

## 2020-06-16 PROCEDURE — 99204 PR OFFICE/OUTPT VISIT, NEW, LEVL IV, 45-59 MIN: ICD-10-PCS | Mod: HCNC,S$GLB,, | Performed by: NEUROLOGICAL SURGERY

## 2020-06-16 PROCEDURE — 72110 X-RAY EXAM L-2 SPINE 4/>VWS: CPT | Mod: TC,HCNC,FY,PO

## 2020-06-16 PROCEDURE — 1125F AMNT PAIN NOTED PAIN PRSNT: CPT | Mod: HCNC,S$GLB,, | Performed by: NEUROLOGICAL SURGERY

## 2020-06-16 PROCEDURE — 1159F PR MEDICATION LIST DOCUMENTED IN MEDICAL RECORD: ICD-10-PCS | Mod: HCNC,S$GLB,, | Performed by: NEUROLOGICAL SURGERY

## 2020-06-16 PROCEDURE — 99999 PR PBB SHADOW E&M-EST. PATIENT-LVL III: ICD-10-PCS | Mod: PBBFAC,HCNC,, | Performed by: NEUROLOGICAL SURGERY

## 2020-06-16 PROCEDURE — 1159F MED LIST DOCD IN RCRD: CPT | Mod: HCNC,S$GLB,, | Performed by: NEUROLOGICAL SURGERY

## 2020-06-16 NOTE — H&P (VIEW-ONLY)
"  Neurosurgery History & Physical    Patient ID: Luke Duff is a 74 y.o. male.    Chief Complaint   Patient presents with    Lumbar Spine Pain (L-Spine)     lumbar spondyliosis, lower back pain that radiates to feet with numbness and tingling, pain today 6/10       HPI:  74 year old male with approximately 12 year history of low back pain.  He was mopping 12 years ago and while doing that had sudden onset of pain and had to go to the hospital.  This happened one subsequent time.  He states his "back" gave out at these times, not his leg.      In April of 2015 he was having pain down the posterior of the left leg.  His pain improved after that surgery.      Since that time he has developed low back pain in the right side of his low back as well as pain going down the posterior aspect of the right leg.     He also has chronic issues of bilateral feet burning/stinging.    Staying in one position seems to make the pain worse.  Standing, sitting, laying down.    The pain will be about a 7 after he has been on one position for too long.  Once he "gets going" the pain tends to improve.      The back pain is worse than the leg pain.      He reports his legs feel week occasionally but overall have not gotten weaker.  He gets "tingly" feelings over the posterior aspect of his legs from time to time.      He denies bowel/bladder incontinence but does have constipation.  He does have some urinary urgency for the last few years.          Review of Systems    Past Medical History:   Diagnosis Date    Allergy     Arthritis     Asthma     Cancer     Left arm, skin    Chronic lower back pain     radiates to both legs and feet, numbness and tingling, feet and calves    Chronic sinusitis     DDD (degenerative disc disease), lumbar     DJD (degenerative joint disease) of knee     GERD (gastroesophageal reflux disease)     Headache(784.0)     HEARING LOSS     both sides, no hearing aides    Open fracture of right tibia "     Posterior capsular opacification visually significant of left eye 2/10/2016    OU done     Social History     Socioeconomic History    Marital status:      Spouse name: Not on file    Number of children: Not on file    Years of education: Not on file    Highest education level: Not on file   Occupational History    Not on file   Social Needs    Financial resource strain: Not on file    Food insecurity     Worry: Not on file     Inability: Not on file    Transportation needs     Medical: Not on file     Non-medical: Not on file   Tobacco Use    Smoking status: Former Smoker     Packs/day: 1.00     Years: 23.00     Pack years: 23.00     Start date: 1961     Quit date: 1984     Years since quittin.3    Smokeless tobacco: Never Used   Substance and Sexual Activity    Alcohol use: Yes     Alcohol/week: 1.0 standard drinks     Types: 1 Glasses of wine per week     Comment: once or twice a week    Drug use: No    Sexual activity: Not on file   Lifestyle    Physical activity     Days per week: Not on file     Minutes per session: Not on file    Stress: Not on file   Relationships    Social connections     Talks on phone: Not on file     Gets together: Not on file     Attends Protestant service: Not on file     Active member of club or organization: Not on file     Attends meetings of clubs or organizations: Not on file     Relationship status: Not on file   Other Topics Concern    Not on file   Social History Narrative    Not on file     Family History   Problem Relation Age of Onset    Heart disease Father     Aneurysm Father 82        liver    Stroke Mother     No Known Problems Sister     Amblyopia Neg Hx     Blindness Neg Hx     Cancer Neg Hx     Cataracts Neg Hx     Diabetes Neg Hx     Glaucoma Neg Hx     Hypertension Neg Hx     Macular degeneration Neg Hx     Retinal detachment Neg Hx     Strabismus Neg Hx     Thyroid disease Neg Hx      Review of patient's  "allergies indicates:   Allergen Reactions    Codeine Other (See Comments)     Severe stomach spasms  Other reaction(s): Stomach upset    Ciprofloxacin hcl      Other reaction(s): agitation    Norco [hydrocodone-acetaminophen] Other (See Comments)     Severe stomach pain    Percocet [oxycodone-acetaminophen]        Current Outpatient Medications:     ASPIRIN/SALICYLAMIDE/CAFFEINE (BC HEADACHE POWDER ORAL), Take by mouth., Disp: , Rfl:     butalbital-acetaminophen-caffeine -40 mg (FIORICET, ESGIC) -40 mg per tablet, Take 1 tablet by mouth every 6 (six) hours as needed. , Disp: , Rfl:     diclofenac sodium (VOLTAREN) 1 % Gel, Apply 2 g topically 2 (two) times daily., Disp: 2 Tube, Rfl: 5    naproxen sodium (ALEVE) 220 mg Cap, Take by mouth., Disp: , Rfl:     omeprazole (PRILOSEC) 40 MG capsule, Take 1 capsule (40 mg total) by mouth once daily., Disp: 90 capsule, Rfl: 3    triamcinolone acetonide 0.1% (KENALOG) 0.1 % cream, Apply topically 2 (two) times daily. Apply for 2 weeks, then take a break for 2 weeks. Repeat as needed., Disp: 80 g, Rfl: 3    atorvastatin (LIPITOR) 40 MG tablet, Take 1 tablet (40 mg total) by mouth once daily. (Patient not taking: Reported on 6/16/2020), Disp: 90 tablet, Rfl: 3    azithromycin (Z-LORI) 250 MG tablet, 2 tabs day one then one tab daily for 4 days (Patient not taking: Reported on 6/16/2020), Disp: 6 tablet, Rfl: 0    clindamycin (CLEOCIN T) 1 % external solution, Apply topically 2 (two) times daily. (Patient not taking: Reported on 6/16/2020), Disp: 60 mL, Rfl: 3    fluorouracil (EFUDEX) 5 % cream, AAA bid x 2 weeks (Patient not taking: Reported on 6/16/2020), Disp: 40 g, Rfl: 1    tamsulosin (FLOMAX) 0.4 mg Cap, Take 1 capsule (0.4 mg total) by mouth once daily. (Patient not taking: Reported on 12/17/2019), Disp: 30 capsule, Rfl: 5  Blood pressure 131/83, pulse 79, height 5' 8" (1.727 m), weight 90.4 kg (199 lb 4.7 oz).      Neurologic Exam    Physical " Exam      Imaging:  MRI of the lumbar spine dated 06/05/2020 is personally reviewed and discussed with the patient in compared to flexion-extension x-ray from 06/16/2020.  His spondylolisthesis worsens on standing x-rays indicating some component of dynamic instability however there is no significant movement on flexion-extension.  On MRI there is significant facet degeneration at L4-5 and possible pars defects bilaterally.    Assessment/Plan:   Mr. Duff is a 74 year old male with Grade I spondylolisthesis at L4-5 and posterior right leg pain as well as low back pain that is worse when standing in 1 position for long period of time.  Of note his spondylolisthesis appears to be increased with upright x-rays when compared to his MRI in supine position.    I discussed with the patient possible intervention which would involve an L4-5 TLIF.  I would recommend physical therapy as well as facet injections.  The patient states that he is very active and has undergone physical therapy in the past which did not help and he would not like to do this however he is interested in following back up with Dr. Kahn for possible facet injections to see if this causes any relief in his back pain.    He will go forward with further pain management and will update us with the results.  If he gets temporary relief from facet injections there may be further facet procedures ectopy done through pain management however these do not work then stabilization with an L4-5 TLIF would be reasonable.    Total visit time 45 min with greater than 50% face-to-face counseling.

## 2020-06-16 NOTE — LETTER
June 24, 2020      LIZZY Hicks  [address] [facility] [address] [place] [address]           [facility]  [address] [place] [address]  Phone: [phone]  Fax: [phone]          Patient: Luke Duff   MR Number: [id number]   YOB: 1945   Date of Visit: 6/16/2020       Dear Adan Alejandro:    Thank you for referring Luke uDff to me for evaluation. Attached you will find relevant portions of my assessment and plan of care.    If you have questions, please do not hesitate to call me. I look forward to following Luke Duff along with you.    Sincerely,    Tejinder Bolanos MD    Enclosure  CC:  No Recipients    If you would like to receive this communication electronically, please contact [email or web address] or [phone] to request more information on [employer] Link access.    For providers and/or their staff who would like to refer a patient to [facility], please contact us through our one-stop-shop provider referral line, [facility], at [phone].    If you feel you have received this communication in error or would no longer like to receive these types of communications, please e-mail [email or web address]

## 2020-06-16 NOTE — PROGRESS NOTES
"  Neurosurgery History & Physical    Patient ID: Luke Duff is a 74 y.o. male.    Chief Complaint   Patient presents with    Lumbar Spine Pain (L-Spine)     lumbar spondyliosis, lower back pain that radiates to feet with numbness and tingling, pain today 6/10       HPI:  74 year old male with approximately 12 year history of low back pain.  He was mopping 12 years ago and while doing that had sudden onset of pain and had to go to the hospital.  This happened one subsequent time.  He states his "back" gave out at these times, not his leg.      In April of 2015 he was having pain down the posterior of the left leg.  His pain improved after that surgery.      Since that time he has developed low back pain in the right side of his low back as well as pain going down the posterior aspect of the right leg.     He also has chronic issues of bilateral feet burning/stinging.    Staying in one position seems to make the pain worse.  Standing, sitting, laying down.    The pain will be about a 7 after he has been on one position for too long.  Once he "gets going" the pain tends to improve.      The back pain is worse than the leg pain.      He reports his legs feel week occasionally but overall have not gotten weaker.  He gets "tingly" feelings over the posterior aspect of his legs from time to time.      He denies bowel/bladder incontinence but does have constipation.  He does have some urinary urgency for the last few years.          Review of Systems    Past Medical History:   Diagnosis Date    Allergy     Arthritis     Asthma     Cancer     Left arm, skin    Chronic lower back pain     radiates to both legs and feet, numbness and tingling, feet and calves    Chronic sinusitis     DDD (degenerative disc disease), lumbar     DJD (degenerative joint disease) of knee     GERD (gastroesophageal reflux disease)     Headache(784.0)     HEARING LOSS     both sides, no hearing aides    Open fracture of right tibia "     Posterior capsular opacification visually significant of left eye 2/10/2016    OU done     Social History     Socioeconomic History    Marital status:      Spouse name: Not on file    Number of children: Not on file    Years of education: Not on file    Highest education level: Not on file   Occupational History    Not on file   Social Needs    Financial resource strain: Not on file    Food insecurity     Worry: Not on file     Inability: Not on file    Transportation needs     Medical: Not on file     Non-medical: Not on file   Tobacco Use    Smoking status: Former Smoker     Packs/day: 1.00     Years: 23.00     Pack years: 23.00     Start date: 1961     Quit date: 1984     Years since quittin.3    Smokeless tobacco: Never Used   Substance and Sexual Activity    Alcohol use: Yes     Alcohol/week: 1.0 standard drinks     Types: 1 Glasses of wine per week     Comment: once or twice a week    Drug use: No    Sexual activity: Not on file   Lifestyle    Physical activity     Days per week: Not on file     Minutes per session: Not on file    Stress: Not on file   Relationships    Social connections     Talks on phone: Not on file     Gets together: Not on file     Attends Oriental orthodox service: Not on file     Active member of club or organization: Not on file     Attends meetings of clubs or organizations: Not on file     Relationship status: Not on file   Other Topics Concern    Not on file   Social History Narrative    Not on file     Family History   Problem Relation Age of Onset    Heart disease Father     Aneurysm Father 82        liver    Stroke Mother     No Known Problems Sister     Amblyopia Neg Hx     Blindness Neg Hx     Cancer Neg Hx     Cataracts Neg Hx     Diabetes Neg Hx     Glaucoma Neg Hx     Hypertension Neg Hx     Macular degeneration Neg Hx     Retinal detachment Neg Hx     Strabismus Neg Hx     Thyroid disease Neg Hx      Review of patient's  "allergies indicates:   Allergen Reactions    Codeine Other (See Comments)     Severe stomach spasms  Other reaction(s): Stomach upset    Ciprofloxacin hcl      Other reaction(s): agitation    Norco [hydrocodone-acetaminophen] Other (See Comments)     Severe stomach pain    Percocet [oxycodone-acetaminophen]        Current Outpatient Medications:     ASPIRIN/SALICYLAMIDE/CAFFEINE (BC HEADACHE POWDER ORAL), Take by mouth., Disp: , Rfl:     butalbital-acetaminophen-caffeine -40 mg (FIORICET, ESGIC) -40 mg per tablet, Take 1 tablet by mouth every 6 (six) hours as needed. , Disp: , Rfl:     diclofenac sodium (VOLTAREN) 1 % Gel, Apply 2 g topically 2 (two) times daily., Disp: 2 Tube, Rfl: 5    naproxen sodium (ALEVE) 220 mg Cap, Take by mouth., Disp: , Rfl:     omeprazole (PRILOSEC) 40 MG capsule, Take 1 capsule (40 mg total) by mouth once daily., Disp: 90 capsule, Rfl: 3    triamcinolone acetonide 0.1% (KENALOG) 0.1 % cream, Apply topically 2 (two) times daily. Apply for 2 weeks, then take a break for 2 weeks. Repeat as needed., Disp: 80 g, Rfl: 3    atorvastatin (LIPITOR) 40 MG tablet, Take 1 tablet (40 mg total) by mouth once daily. (Patient not taking: Reported on 6/16/2020), Disp: 90 tablet, Rfl: 3    azithromycin (Z-LORI) 250 MG tablet, 2 tabs day one then one tab daily for 4 days (Patient not taking: Reported on 6/16/2020), Disp: 6 tablet, Rfl: 0    clindamycin (CLEOCIN T) 1 % external solution, Apply topically 2 (two) times daily. (Patient not taking: Reported on 6/16/2020), Disp: 60 mL, Rfl: 3    fluorouracil (EFUDEX) 5 % cream, AAA bid x 2 weeks (Patient not taking: Reported on 6/16/2020), Disp: 40 g, Rfl: 1    tamsulosin (FLOMAX) 0.4 mg Cap, Take 1 capsule (0.4 mg total) by mouth once daily. (Patient not taking: Reported on 12/17/2019), Disp: 30 capsule, Rfl: 5  Blood pressure 131/83, pulse 79, height 5' 8" (1.727 m), weight 90.4 kg (199 lb 4.7 oz).      Neurologic Exam    Physical " Exam      Imaging:  MRI of the lumbar spine dated 06/05/2020 is personally reviewed and discussed with the patient in compared to flexion-extension x-ray from 06/16/2020.  His spondylolisthesis worsens on standing x-rays indicating some component of dynamic instability however there is no significant movement on flexion-extension.  On MRI there is significant facet degeneration at L4-5 and possible pars defects bilaterally.    Assessment/Plan:   Mr. Duff is a 74 year old male with Grade I spondylolisthesis at L4-5 and posterior right leg pain as well as low back pain that is worse when standing in 1 position for long period of time.  Of note his spondylolisthesis appears to be increased with upright x-rays when compared to his MRI in supine position.    I discussed with the patient possible intervention which would involve an L4-5 TLIF.  I would recommend physical therapy as well as facet injections.  The patient states that he is very active and has undergone physical therapy in the past which did not help and he would not like to do this however he is interested in following back up with Dr. Kahn for possible facet injections to see if this causes any relief in his back pain.    He will go forward with further pain management and will update us with the results.  If he gets temporary relief from facet injections there may be further facet procedures ectopy done through pain management however these do not work then stabilization with an L4-5 TLIF would be reasonable.    Total visit time 45 min with greater than 50% face-to-face counseling.

## 2020-06-17 ENCOUNTER — TELEPHONE (OUTPATIENT)
Dept: PAIN MEDICINE | Facility: CLINIC | Age: 75
End: 2020-06-17

## 2020-06-17 NOTE — TELEPHONE ENCOUNTER
----- Message from Allan Polanco sent at 6/17/2020 11:39 AM CDT -----  Type: Needs Medical Advice    Who Called:  Patient  Best Call Back Number: [phone]  Additional Information: Caller would like to discuss procedure. Please call to advise. Thanks!

## 2020-06-18 NOTE — TELEPHONE ENCOUNTER
Please contact the patient and find out if he is referring to the spinal cord stimulator trial.  He just had a visit with Neurosurgery but I cannot see the recommendations yet.  If it was recommended that he have a spinal cord stimulator trial, please set this up with [employer].

## 2020-06-19 ENCOUNTER — TELEPHONE (OUTPATIENT)
Dept: PAIN MEDICINE | Facility: CLINIC | Age: 75
End: 2020-06-19

## 2020-06-19 NOTE — TELEPHONE ENCOUNTER
----- Message from Sherrie Epps sent at 6/19/2020 10:50 AM CDT -----  Type: Needs Medical Advice    Who Called:  Patient    Best Call Back Number: [phone]    Additional Information:  Patient states he sent a message requesting a call back about a procedure on Tuesday, has not received a call back.  Please call patient to discuss procedure & scheduling, thank you!

## 2020-06-23 DIAGNOSIS — G89.4 CHRONIC PAIN SYNDROME: Primary | ICD-10-CM

## 2020-06-23 NOTE — TELEPHONE ENCOUNTER
"Spoke with patient about scheduling the stimulator trial. Patient was informed that he would need to have a surgical psych evaluation prior to the stimulator trial. Patient became upset. He stated he has never had to have this done before and this was never required. I explained to the patient that this has always been required and this required by the insurance company and Dr. Kahn. He stated he is going to call his insurance to confirm. He stated he does not want to answer personal questions and stated "the government tracks all of this". Patient also stated this goes against his [employer] beliefs. He was given the number to [facility] and he stated he may not go and may cancel the stimulator.   "

## 2020-06-23 NOTE — TELEPHONE ENCOUNTER
Can we try to do more visits with Dr. Michael in [place], psych visits. She can do video visits and it will be less hassle of long visits for patients.

## 2020-06-25 ENCOUNTER — TELEPHONE (OUTPATIENT)
Dept: PAIN MEDICINE | Facility: CLINIC | Age: 75
End: 2020-06-25

## 2020-06-25 DIAGNOSIS — Z11.9 SCREENING EXAMINATION FOR INFECTIOUS DISEASE: Primary | ICD-10-CM

## 2020-06-25 NOTE — TELEPHONE ENCOUNTER
Please let the patient know that we have reviewed the notes from Neurosurgery.  It was suggested that we try bilateral L4/5 facet joint injections.   I have reviewed his case with Dr. Kahn and we should do this prior to a spinal cord stimulator trial.  Please set this up for him with a follow-up with Dr. Kahn.

## 2020-06-26 DIAGNOSIS — M47.816 LUMBAR SPONDYLOSIS: Primary | ICD-10-CM

## 2020-06-26 RX ORDER — SODIUM CHLORIDE, SODIUM LACTATE, POTASSIUM CHLORIDE, CALCIUM CHLORIDE 600; 310; 30; 20 MG/100ML; MG/100ML; MG/100ML; MG/100ML
INJECTION, SOLUTION INTRAVENOUS CONTINUOUS
Status: CANCELLED | OUTPATIENT
Start: 2020-07-07

## 2020-06-26 NOTE — TELEPHONE ENCOUNTER
Procedure and covid test scheduled. Preop instructions were given. Patient verbalized understanding.

## 2020-07-04 ENCOUNTER — LAB VISIT (OUTPATIENT)
Dept: FAMILY MEDICINE | Facility: CLINIC | Age: 75
End: 2020-07-04
Payer: MEDICARE

## 2020-07-04 DIAGNOSIS — Z11.9 SCREENING EXAMINATION FOR INFECTIOUS DISEASE: ICD-10-CM

## 2020-07-04 PROCEDURE — U0003 INFECTIOUS AGENT DETECTION BY NUCLEIC ACID (DNA OR RNA); SEVERE ACUTE RESPIRATORY SYNDROME CORONAVIRUS 2 (SARS-COV-2) (CORONAVIRUS DISEASE [COVID-19]), AMPLIFIED PROBE TECHNIQUE, MAKING USE OF HIGH THROUGHPUT TECHNOLOGIES AS DESCRIBED BY CMS-2020-01-R: HCPCS | Mod: HCNC

## 2020-07-06 LAB — SARS-COV-2 RNA RESP QL NAA+PROBE: NOT DETECTED

## 2020-07-07 ENCOUNTER — HOSPITAL ENCOUNTER (OUTPATIENT)
Facility: HOSPITAL | Age: 75
Discharge: HOME | End: 2020-07-07
Attending: ANESTHESIOLOGY | Admitting: ANESTHESIOLOGY
Payer: MEDICARE

## 2020-07-07 ENCOUNTER — HOSPITAL ENCOUNTER (OUTPATIENT)
Dept: RADIOLOGY | Facility: HOSPITAL | Age: 75
Discharge: HOME | End: 2020-07-07
Attending: ANESTHESIOLOGY | Admitting: ANESTHESIOLOGY
Payer: MEDICARE

## 2020-07-07 DIAGNOSIS — M47.816 FACET HYPERTROPHY OF LUMBAR REGION: ICD-10-CM

## 2020-07-07 DIAGNOSIS — M47.816 LUMBAR SPONDYLOSIS: ICD-10-CM

## 2020-07-07 PROCEDURE — 76000 FLUOROSCOPY <1 HR PHYS/QHP: CPT | Mod: TC,HCNC,PO

## 2020-07-07 PROCEDURE — 64493 INJ PARAVERT F JNT L/S 1 LEV: CPT | Mod: 50,HCNC,PO | Performed by: ANESTHESIOLOGY

## 2020-07-07 PROCEDURE — 25500020 PHARM REV CODE 255: Mod: HCNC,PO | Performed by: ANESTHESIOLOGY

## 2020-07-07 PROCEDURE — 64493 PR INJ DX/THER AGNT PARAVERT FACET JOINT,IMG GUIDE,LUMBAR/SAC,1ST LVL: ICD-10-PCS | Mod: 50,HCNC,, | Performed by: ANESTHESIOLOGY

## 2020-07-07 PROCEDURE — 25000003 PHARM REV CODE 250: Mod: HCNC,PO | Performed by: ANESTHESIOLOGY

## 2020-07-07 PROCEDURE — 63600175 PHARM REV CODE 636 W HCPCS: Mod: HCNC,PO | Performed by: ANESTHESIOLOGY

## 2020-07-07 PROCEDURE — 64493 INJ PARAVERT F JNT L/S 1 LEV: CPT | Mod: 50,HCNC,, | Performed by: ANESTHESIOLOGY

## 2020-07-07 RX ORDER — MIDAZOLAM HYDROCHLORIDE 2 MG/2ML
INJECTION, SOLUTION INTRAMUSCULAR; INTRAVENOUS
Status: DISCONTINUED | OUTPATIENT
Start: 2020-07-07 | End: 2020-07-07 | Stop reason: HOSPADM

## 2020-07-07 RX ORDER — METHYLPREDNISOLONE ACETATE 80 MG/ML
INJECTION, SUSPENSION INTRA-ARTICULAR; INTRALESIONAL; INTRAMUSCULAR; SOFT TISSUE
Status: DISCONTINUED | OUTPATIENT
Start: 2020-07-07 | End: 2020-07-07 | Stop reason: HOSPADM

## 2020-07-07 RX ORDER — LIDOCAINE HYDROCHLORIDE 10 MG/ML
1 INJECTION INFILTRATION; PERINEURAL ONCE
Status: COMPLETED | OUTPATIENT
Start: 2020-07-07 | End: 2020-07-07

## 2020-07-07 RX ORDER — LIDOCAINE HYDROCHLORIDE 10 MG/ML
INJECTION, SOLUTION EPIDURAL; INFILTRATION; INTRACAUDAL; PERINEURAL
Status: DISCONTINUED | OUTPATIENT
Start: 2020-07-07 | End: 2020-07-07 | Stop reason: HOSPADM

## 2020-07-07 RX ORDER — FENTANYL CITRATE 50 UG/ML
INJECTION, SOLUTION INTRAMUSCULAR; INTRAVENOUS
Status: DISCONTINUED | OUTPATIENT
Start: 2020-07-07 | End: 2020-07-07 | Stop reason: HOSPADM

## 2020-07-07 RX ORDER — BUPIVACAINE HYDROCHLORIDE 2.5 MG/ML
INJECTION, SOLUTION EPIDURAL; INFILTRATION; INTRACAUDAL
Status: DISCONTINUED | OUTPATIENT
Start: 2020-07-07 | End: 2020-07-07 | Stop reason: HOSPADM

## 2020-07-07 RX ORDER — SODIUM CHLORIDE, SODIUM LACTATE, POTASSIUM CHLORIDE, CALCIUM CHLORIDE 600; 310; 30; 20 MG/100ML; MG/100ML; MG/100ML; MG/100ML
INJECTION, SOLUTION INTRAVENOUS CONTINUOUS
Status: DISCONTINUED | OUTPATIENT
Start: 2020-07-07 | End: 2020-07-07 | Stop reason: HOSPADM

## 2020-07-07 RX ADMIN — LIDOCAINE HYDROCHLORIDE 1 ML: 10 INJECTION, SOLUTION EPIDURAL; INFILTRATION; INTRACAUDAL; PERINEURAL at 02:07

## 2020-07-07 RX ADMIN — SODIUM CHLORIDE, SODIUM LACTATE, POTASSIUM CHLORIDE, AND CALCIUM CHLORIDE: .6; .31; .03; .02 INJECTION, SOLUTION INTRAVENOUS at 02:07

## 2020-07-07 NOTE — DISCHARGE SUMMARY
[facility]  Discharge Note  Short Stay    Admit Date: 7/7/2020    Discharge Date: 7/7/2020    Attending Physician: Josafat Kahn MD     Discharge Provider: Josafat Kahn    Diagnoses:  Active Hospital Problems    Diagnosis  POA    *Lumbar spondylosis [M47.816]  Yes      Resolved Hospital Problems   No resolved problems to display.       Discharged Condition: good    Final Diagnoses: Lumbar spondylosis [M47.816]    Disposition: Home or Self Care    Hospital Course: no complications, uneventful    Outcome of Hospitalization, Treatment, Procedure, or Surgery:  Patient was admitted for outpatient procedure. The patient underwent procedure without complications and are discharged home    Follow up/Patient Instructions:  Follow up as scheduled in Pain Management clinic in 3-4 weeks/Patient has received instructions and follow up date and time    Medications:  Continue previous medications    Discharge Procedure Orders   Call MD for:  temperature >100.4     Call MD for:  severe uncontrolled pain     Call MD for:  redness, tenderness, or signs of infection (pain, swelling, redness, odor or green/yellow discharge around incision site)     Call MD for:  severe persistent headache     No dressing needed         Discharge Procedure Orders (must include Diet, Follow-up, Activity):   Discharge Procedure Orders (must include Diet, Follow-up, Activity)   Call MD for:  temperature >100.4     Call MD for:  severe uncontrolled pain     Call MD for:  redness, tenderness, or signs of infection (pain, swelling, redness, odor or green/yellow discharge around incision site)     Call MD for:  severe persistent headache     No dressing needed

## 2020-07-07 NOTE — OP NOTE
PROCEDURE DATE: 7/7/2020    PROCEDURE: Bilateral side L4/5 facet joint injection under fluoroscopy.    Diagnosis: Lumbar spondylosis    PHYSICIAN: Josafat Kahn MD    MEDICATIONS INJECTED:  From a mixture of 3 ml of 0.25% bupivicaine and 80mg of methylprednisone, 0.9ml of solution was injected into each facet joint.    LOCAL ANESTHETIC USED:   Lidocaine 1%, 4 ml per level.    SEDATION MEDICATIONS: 4mg versed, 25mcg fentanyl    ESTIMATED BLOOD LOSS:  none    COMPLICATIONS:  none    TECHNIQUE:   A time-out was taken to identify the patient and procedure side prior to starting the procedure.  Lying in a prone position, the patient was prepped and draped in the usual sterile fashion using ChloraPrep and fenestrated drape.  The levels were determined under fluoroscopic guidance in the AP view and then rotated into the oblique view to visualize the joint space.  Local anesthetic was given by raising a wheal at the skin and then anesthetizing a tract using a 25-gauge, 1.5inch needle.  A 3.5 in 25-gauge needle was introduced into the right and then left L4/5 facet joints.  Negative pressure applied to make sure that there was no intravascular placement.  Omnipaque contrast was injected to confirm placement and to confirm arthrogram and no vascular uptake.  Medication was then injected slowly.  The patient tolerated the procedure well.    The patient was monitored after the procedure.  Patient was given post procedure and discharge instructions to follow at home. The patient was discharged in a stable condition    Event Time In   In Facility 1355   In Pre-Procedure 1430   Physician Available    Anesthesia Available    Pre-Op: Bedside Procedure Start    Pre-Op: Bedside Procedure Stop    Pre-Procedure Complete 1456   Out of Pre-Procedure    Anesthesia Start    Anesthesia Start Data Collection    Setup Start    Setup Complete    In Room 1613   Prep Start    Procedure Prep Complete    Procedure Start 1623   Procedure Closing     Emergence    Procedure Finish    Sedation Start 1612   Scope In    Extent Reached    Scope Out    Sedation End 1629   Out of Room    Cleanup Start    Cleanup Complete    Cosmetic Start    Cosmetic Stop    Pain Mgmt In Room    Pain Mgmt Out Room    In Recovery    Anesthesia Finish    Bedside Procedure Start    Bedside Procedure Stop    Recovery Care Complete    Out of Recovery    To Phase II    In Phase II    Pain Mgmt Recovery Start    Pain Mgmt Recovery Stop    Obs Rec Start    Obs Rec Stop    Phase II Care Complete    Out of Phase II    Procedural Care Complete    Discharge    Pain Follow Up Needed    Pain Follow Up Complete

## 2020-07-08 VITALS
TEMPERATURE: 98 F | OXYGEN SATURATION: 95 % | RESPIRATION RATE: 16 BRPM | SYSTOLIC BLOOD PRESSURE: 162 MMHG | DIASTOLIC BLOOD PRESSURE: 95 MMHG | HEIGHT: 68 IN | BODY MASS INDEX: 28.34 KG/M2 | HEART RATE: 74 BPM | WEIGHT: 187 LBS

## 2020-08-04 ENCOUNTER — OFFICE VISIT (OUTPATIENT)
Dept: PAIN MEDICINE | Facility: CLINIC | Age: 75
End: 2020-08-04
Payer: MEDICARE

## 2020-08-04 ENCOUNTER — OFFICE VISIT (OUTPATIENT)
Dept: FAMILY MEDICINE | Facility: CLINIC | Age: 75
End: 2020-08-04
Payer: MEDICARE

## 2020-08-04 VITALS
DIASTOLIC BLOOD PRESSURE: 80 MMHG | BODY MASS INDEX: 29.98 KG/M2 | HEART RATE: 72 BPM | WEIGHT: 197.19 LBS | TEMPERATURE: 98 F | RESPIRATION RATE: 18 BRPM | SYSTOLIC BLOOD PRESSURE: 143 MMHG

## 2020-08-04 VITALS
HEIGHT: 68 IN | BODY MASS INDEX: 29.8 KG/M2 | HEART RATE: 72 BPM | OXYGEN SATURATION: 97 % | TEMPERATURE: 98 F | SYSTOLIC BLOOD PRESSURE: 146 MMHG | DIASTOLIC BLOOD PRESSURE: 86 MMHG | WEIGHT: 196.63 LBS

## 2020-08-04 DIAGNOSIS — M46.90 INFLAMMATORY SPONDYLOPATHY, UNSPECIFIED SPINAL REGION: ICD-10-CM

## 2020-08-04 DIAGNOSIS — M47.816 LUMBAR SPONDYLOSIS: Primary | ICD-10-CM

## 2020-08-04 DIAGNOSIS — M43.16 SPONDYLOLISTHESIS OF LUMBAR REGION: ICD-10-CM

## 2020-08-04 DIAGNOSIS — G89.4 CHRONIC PAIN SYNDROME: ICD-10-CM

## 2020-08-04 DIAGNOSIS — I70.0 ATHEROSCLEROSIS OF AORTA: ICD-10-CM

## 2020-08-04 DIAGNOSIS — M54.16 LUMBAR RADICULITIS: ICD-10-CM

## 2020-08-04 DIAGNOSIS — K21.00 GASTROESOPHAGEAL REFLUX DISEASE WITH ESOPHAGITIS: ICD-10-CM

## 2020-08-04 DIAGNOSIS — Z00.00 ENCOUNTER FOR PREVENTIVE HEALTH EXAMINATION: Primary | ICD-10-CM

## 2020-08-04 DIAGNOSIS — M51.36 DDD (DEGENERATIVE DISC DISEASE), LUMBAR: ICD-10-CM

## 2020-08-04 DIAGNOSIS — E78.2 MIXED HYPERLIPIDEMIA: ICD-10-CM

## 2020-08-04 PROCEDURE — 99215 PR OFFICE/OUTPT VISIT, EST, LEVL V, 40-54 MIN: ICD-10-PCS | Mod: HCNC,S$GLB,, | Performed by: PHYSICIAN ASSISTANT

## 2020-08-04 PROCEDURE — 99499 UNLISTED E&M SERVICE: CPT | Mod: S$GLB,,, | Performed by: NURSE PRACTITIONER

## 2020-08-04 PROCEDURE — 1126F PR PAIN SEVERITY QUANTIFIED, NO PAIN PRESENT: ICD-10-PCS | Mod: HCNC,S$GLB,, | Performed by: PHYSICIAN ASSISTANT

## 2020-08-04 PROCEDURE — 99999 PR PBB SHADOW E&M-EST. PATIENT-LVL III: CPT | Mod: PBBFAC,HCNC,, | Performed by: PHYSICIAN ASSISTANT

## 2020-08-04 PROCEDURE — G0439 PPPS, SUBSEQ VISIT: HCPCS | Mod: HCNC,S$GLB,, | Performed by: NURSE PRACTITIONER

## 2020-08-04 PROCEDURE — 99499 RISK ADDL DX/OHS AUDIT: ICD-10-PCS | Mod: S$GLB,,, | Performed by: NURSE PRACTITIONER

## 2020-08-04 PROCEDURE — 1159F MED LIST DOCD IN RCRD: CPT | Mod: HCNC,S$GLB,, | Performed by: PHYSICIAN ASSISTANT

## 2020-08-04 PROCEDURE — 99999 PR PBB SHADOW E&M-EST. PATIENT-LVL III: ICD-10-PCS | Mod: PBBFAC,HCNC,, | Performed by: PHYSICIAN ASSISTANT

## 2020-08-04 PROCEDURE — 99215 OFFICE O/P EST HI 40 MIN: CPT | Mod: HCNC,S$GLB,, | Performed by: PHYSICIAN ASSISTANT

## 2020-08-04 PROCEDURE — 99999 PR PBB SHADOW E&M-EST. PATIENT-LVL IV: ICD-10-PCS | Mod: PBBFAC,HCNC,, | Performed by: NURSE PRACTITIONER

## 2020-08-04 PROCEDURE — 1101F PR PT FALLS ASSESS DOC 0-1 FALLS W/OUT INJ PAST YR: ICD-10-PCS | Mod: HCNC,CPTII,S$GLB, | Performed by: PHYSICIAN ASSISTANT

## 2020-08-04 PROCEDURE — 1126F AMNT PAIN NOTED NONE PRSNT: CPT | Mod: HCNC,S$GLB,, | Performed by: PHYSICIAN ASSISTANT

## 2020-08-04 PROCEDURE — G0439 PR MEDICARE ANNUAL WELLNESS SUBSEQUENT VISIT: ICD-10-PCS | Mod: HCNC,S$GLB,, | Performed by: NURSE PRACTITIONER

## 2020-08-04 PROCEDURE — 1159F PR MEDICATION LIST DOCUMENTED IN MEDICAL RECORD: ICD-10-PCS | Mod: HCNC,S$GLB,, | Performed by: PHYSICIAN ASSISTANT

## 2020-08-04 PROCEDURE — 99999 PR PBB SHADOW E&M-EST. PATIENT-LVL IV: CPT | Mod: PBBFAC,HCNC,, | Performed by: NURSE PRACTITIONER

## 2020-08-04 PROCEDURE — 1101F PT FALLS ASSESS-DOCD LE1/YR: CPT | Mod: HCNC,CPTII,S$GLB, | Performed by: PHYSICIAN ASSISTANT

## 2020-08-04 RX ORDER — OMEPRAZOLE 40 MG/1
40 CAPSULE, DELAYED RELEASE ORAL DAILY
Qty: 90 CAPSULE | Refills: 3 | Status: SHIPPED | OUTPATIENT
Start: 2020-08-04 | End: 2021-09-14 | Stop reason: SDUPTHER

## 2020-08-04 NOTE — PATIENT INSTRUCTIONS
Counseling and Referral of Other Preventative  (Italic type indicates deductible and co-insurance are waived)    Patient Name: Luke Duff  Today's Date: 8/4/2020    Health Maintenance       Date Due Completion Date    Hepatitis C Screening 1945 ---    Pneumococcal Vaccine (65+ Low/Medium Risk) (1 of 2 - PCV13) 12/17/2020 (Originally 7/12/2009) ---    Shingles Vaccine (1 of 2) 12/17/2020 (Originally 7/12/1994) ---    Influenza Vaccine (1) 09/01/2020 ---    Fecal Occult Blood Test (FOBT)/[employer] 11/12/2020 11/12/2019    Lipid Panel 12/17/2020 12/17/2019    TETANUS VACCINE 06/11/2029 6/11/2019    Override on 12/12/2017: Declined        No orders of the defined types were placed in this encounter.    The following information is provided to all patients.  This information is to help you find resources for any of the problems found today that may be affecting your health:                Living healthy guide: www.[facility].[place].gov      Understanding Diabetes: www.diabetes.org      Eating healthy: www.cdc.gov/healthyweight      CDC home safety checklist: www.cdc.gov/steadi/patient.html      Agency on Aging: www.goea.[place].gov      Alcoholics anonymous (AA): www.aa.org      Physical Activity: www.shahbaz.nih.gov/xf0stlo      Tobacco use: www.quitwithusla.org

## 2020-08-04 NOTE — PROGRESS NOTES
"  Luke Duff presented for a  Medicare AWV and comprehensive Health Risk Assessment today. The following components were reviewed and updated:    · Medical history  · Family History  · Social history  · Allergies and Current Medications  · Health Risk Assessment  · Health Maintenance  · Care Team     ** See Completed Assessments for Annual Wellness Visit within the encounter summary.**       The following assessments were completed:  · Living Situation  · CAGE  · Depression Screening  · Timed Get Up and Go  · Whisper Test  · Cognitive Function Screening      · Nutrition Screening  · ADL Screening  · PAQ Screening    Vitals:    08/04/20 1035   BP: (!) 146/86   BP Location: Left arm   Patient Position: Sitting   BP Method: Medium (Manual)   Pulse: 72   Temp: 98.3 °F (36.8 °C)   TempSrc: Oral   SpO2: 97%   Weight: 89.2 kg (196 lb 10.4 oz)   Height: 5' 8" (1.727 m)     Body mass index is 29.9 kg/m².  Physical Exam  Vitals signs reviewed.   Constitutional:       Appearance: Normal appearance.   Cardiovascular:      Rate and Rhythm: Normal rate and regular rhythm.      Pulses: Normal pulses.      Heart sounds: Normal heart sounds.   Pulmonary:      Effort: Pulmonary effort is normal.      Breath sounds: Normal breath sounds.   Skin:     General: Skin is warm and dry.   Neurological:      Mental Status: He is alert.        Diagnoses and health risks identified today and associated recommendations/orders:    1. Encounter for preventive health examination  Reviewed and discussed health maintenance.      2. Inflammatory spondylopathy, unspecified spinal region  Stable- continue current treatment and follow up routinely with PCP     3. Atherosclerosis of aorta  Stable- continue current treatment and follow up routinely with PCP     4. Mixed hyperlipidemia  Stable- continue current treatment and follow up routinely with PCP     5. Gastroesophageal reflux disease with esophagitis  - omeprazole (PRILOSEC) 40 MG capsule; Take 1 " capsule (40 mg total) by mouth once daily.  Dispense: 90 capsule; Refill: 3      Provided Luke with a 5-10 year written screening schedule and personal prevention plan. Recommendations were developed using the USPSTF age appropriate recommendations. Education, counseling, and referrals were provided as needed. After Visit Summary printed and given to patient which includes a list of additional screenings\tests needed.    Susan Vital NP

## 2020-08-04 NOTE — PROGRESS NOTES
CC: Back pain    HPI: The patient is a 75-year-old man with a history of GERD, COPD, presents in referral from Dr. Eldridge for bilateral foot pain, low back pain.  He is status post bilateral L4/5 facet joint injections on 07/11/2020 with moderate relief lasting 1 week and then slowly wearing off.  He continues to have left greater than right low back pain.  He describes the pain as sharp, burning and he feels a cold sensation that starts on the left side of his low back and travels across to the right side.  He denies any changes to the burning and pain in his feet.  His pain is typically worse with sitting and lying down and improved with activity.  He denies weakness but reports some lower extremity fatigue.  He reports numbness in his feet.  He denies bladder or bowel incontinence.     Pain intervention history: He has seen Dr. You for metatarsalgia with Redmond's neuroma, is considering surgical options.  Had some injections in the feet without any major relief.  He tried gabapentin but felt cognitively impaired with this. Left L4 transforaminal epidural steroid injection on 12/17/12 with 85% pain to back, 60% to left side of low back and bilateral foot pain about 10%. He returns in followup today he is status post left L4/5 transforaminal injection on 11/12/13 With about 6 months of relief.  He is status post L4/5 interlaminar epidural steroid injection on 5/19/14 with greater than 50% relief.  He is status post left L3 and L4 transforaminal epidural steroid injection on 3/9/16 with 40% relief.  He is status post left L3 and L4 transforaminal epidural steroid injection on 4/8/16 with 60% relief.  He is status post left GTB injection on 5/2/16 with moderate relief lasting a few weeks.  He is status post left L2, 3, 4 and 5 medial branch radiofrequency ablation on 7/8/16 with 100% relief of his left sided pain.  He is status post right L2, 3, 4 and 5 medial branch radiofrequency ablation on 9/28/16 with almost  complete relief of his right low back pain.  He is status post left L2, 3, 4 and 5 medial branch radiofrequency ablation on 8/9/17 with 100% relief of his arthritic pain.  He is status post left L2, 3, 4 and 5 medial branch radiofrequency ablation on 8/9/17 with 100% relief of his arthritic pain.  He is status post caudal epidural steroid injection on 10/04/2017 with almost 100% relief lasting about 9 months.  He is status post caudal epidural steroid injection on 12/19/18 with 100% relief lasting about 10 months.   He is status post caudal epidural steroid injection on 01/27/2020 with 90% relief of his back pain. He is status post bilateral L4/5 facet joint injections on 07/11/2020 with moderate relief lasting 1 week and then slowly wearing off.     ROS:He reports joint pain, back pain.  Balance of review of systems negative.    Medical, surgical, family and social history reviewed elsewhere in record.    Medications/Allergies: See med card      Vitals:    08/04/20 0940   BP: (!) 143/80   Pulse: 72   Resp: 18   Temp: 98.3 °F (36.8 °C)   Weight: 89.4 kg (197 lb 3.2 oz)   PainSc: 0-No pain         Physical exam:  Gen: A and O x3, pleasant, well-groomed  Skin: No rashes or obvious lesions  HEENT: PERRLA  CVS: Regular rate and rhythm, normal S1 and S2, no murmurs.  Resp: Clear to auscultation bilaterally, no wheezes or rales.  Musculoskeletal: Able to heel walk, toe walk. No antalgic gait.     Neuro:  Lower extremities: 5/5 strength bilaterally  Reflexes: Patellar 1+, Achilles 2+.  Sensory:  Intact and symmetrical to light touch and pinprick in L2-S1 dermatomes bilaterally.    Lumbar spine:  Lumbar spine: ROM is full with flexion and extension with increased left low back and left buttock pain during each maneuver.  Jimmy's test is negative bilaterally.  Supine straight leg raise is negative bilaterally.    Internal and external rotation of the hip is negative bilaterally.  Myofascial exam:  Mild tenderness to  palpation to the lumbar paraspinous muscles.       Imaging:  MRI Lumbar Spine 11/8/2012  L1-L2: There is minimal posterior disc bulge extending less than 2 mm into the spinal canal. There is minimal thecal sac compression and no foraminal compromise.  L2-L3: There is minimal posterior disc bulge extending less than 2 mm into the spinal canal. There is minimal thecal sac compression and no foraminal compromise.  L3-L4: There is a diffuse 3-mm posterior disc bulge. There is mild degenerative facet arthrosis with ligamentum flavum thickening. A combination of disc bulge and facet arthrosis results in moderate spinal canal stenosis with concentric compression of the thecal sac to an AP diameter of 8 mm and moderate bilateral foraminal stenosis.  L4-L5: There is a tear of the posterior disc annulus associated with a diffuse 3-mm posterior disc bulge. There is mild degenerative facet arthrosis with ligamentum flavum thickening. A combination of disc bulge and facet arthrosis results in moderate spinal canal stenosis with concentric compression of the thecal sac to an AP diameter of 8 mm in moderate bilateral foraminal stenosis.  L5-S1: There is minimal posterior disc bulge extending less than 2 mm into the spinal canal. There is minimal thecal sac compression and there is no foraminal compromise.    MRI L-spine 11/13/2008: At L1-2 mild broad-based disc bulge asymmetric to the left may contact exiting nerve root.  At L2/3 mild broad-based disc bulge more lateral to the left exiting nerve root on the left comes near the disc bulge.  At L3/4 broad-based disc bulge asymmetric to the left with facet hypertrophy and ligamentous hypertrophy causing mild central canal narrowing.  Moderate left foraminal stenosis.  At L4/5 there is facet arthropathy, broad-based disc bulge causing moderate bilateral foraminal stenosis and mild central canal narrowing.  Possible annular tear at that level.  This most comes near the exiting nerve  root at that level.  At L5/S1 no significant central canal stenosis or disc bulging.    MRI lumbar spine 1/5/15  The lumbar vertebral bodies show normal height and signal intensity without evidence of acute compression fracture or pathologic marrow replacement process. Very few scattered incidentally observed vertebral body hemangiomas present throughout the lumbar spine. There is a grade I retrolisthesis of L3 on L4. There is degenerative disc desiccation at every lumbar level with relative sparing of the L5-S1 intervertebral disc. The conus medullaris terminates at L1-L2. The visualized lower thoracic spinal cord is normal in signal intensity. The incidentally observed retroperitoneal soft tissues are unremarkable.  T12-L1: There is ligamentum flavum thickening. No central canal or neuroforaminal stenosis. No disc protrusion or extrusion.  L1-L2: There is an annular fissure present within the right paracentral portion of the intervertebral disc. No central canal or neuroforaminal stenosis. No disc protrusion or extrusion.  L2-L3: There is a broad disc bulge with a central annular tear. There is mild bilateral hypertrophic facet arthropathy. No central canal or neuroforaminal stenosis. No disc protrusion or extrusion.  L3-L4: There is a broad disc bulge, ligamentum flavum thickening, and hypertrophic facet arthropathy. There is an annular fissure present within the central intervertebral disc. There is mild-moderate central canal stenosis. There is moderate proximalleft neuroforaminal stenosis. There is mild right neuroforaminal stenosis.  L4-L5: There is a broad disc bulge with a central annular fissure/tear. There is ligamentum flavum thickening and hypertrophic facet arthropathy. There is, at most, mild overall central canal stenosis. No neuroforaminal stenosis.  L5-S1: There is bilateral facet arthropathy.    MRI lumbar spine 2/16/16  Vertebral body heights appear well-preserved. Vertebral body alignment  appears adequate. Decreased T2 signal is noted at all lumbar levels consistent with degenerative disk desiccation. Postsurgical scarring is noted at L4 level with postsurgical partial laminectomy suspected at L4-L5 level. Spurring is noted at the SI joints bilaterally.  No STIR signal abnormality is noted to suggest an aggressive osseous process.  At the T12-L1 level, no significant disk bulge, central canal stenosis, or foraminal stenosis noted  At the L1-L2 level, mild broad-based bulging is noted towards the left neuroforamen more noticeable than right of approximately 3 mm. Mild left neural foraminal narrowing is noted with no significant right-sided neural foraminal narrowing or central canal narrowing.  At L2-L3 level, facet arthropathy and ligamentous hypertrophy is noted. Broad-based bulging is noted towards the left neuroforamen greater than right of 3 mm with increased T2 signal suggestive of an annular tear . Mild left neural foraminal narrowing greater than right neural foraminal narrowing is noted. No convincing detrimental change is noted since the prior. Minimal central canal narrowing is noted  At the L3-L4 level, mild ligamentous hypertrophy appears to be present. Broad-based bulging appears to be present towards each neuroforamen with moderate neural foraminal stenosis on the left greater than right. This may be slightly progressed on the right since the prior, less central canal narrowing appears to be present on the prior  At L4-L5 level, evidence of laminectomy is noted. Mild broad-based bulging is noted towards the paracentral left in particular of 3-4 mm. Scar tissue surrounds the right nerve foramen. Mild to moderate left neural foraminal narrowing is suspected mild right neural foraminal narrowing. Less central canal narrowing is noted than on the prior with minimal central canal narrowing suspected. The neural foraminal narrowing on the left may be slightly progressed since the prior  At  L5-S1 level, no significant disk bulge, central canal stenosis, or nerve foraminal stenosis noted.      Assessment:  The patient is a 75-year-old man with a history of GERD, COPD, presents in referral from Dr. Paz for bilateral foot pain, low back pain.      1. Lumbar spondylosis    2. Spondylolisthesis of lumbar region    3. Chronic pain syndrome    4. Lumbar radiculitis    5. DDD (degenerative disc disease), lumbar        Plan:  1.  The patient did not have lasting relief following the L4/5 facet joint injections and had not had lasting relief following RFA in the past.  He is going to follow up with Neurosurgery for further discussion.  If an operation is not recommended he may consider a spinal cord stimulator trial with [employer].  He will contact me after he has seen Neurosurgery and if we set up a trial I will order a back brace with lateral support.    Greater than 50% of this 40 minutes visit was spent counseling the patient.

## 2020-08-05 ENCOUNTER — TELEPHONE (OUTPATIENT)
Dept: FAMILY MEDICINE | Facility: CLINIC | Age: 75
End: 2020-08-05

## 2020-08-05 DIAGNOSIS — M51.36 DDD (DEGENERATIVE DISC DISEASE), LUMBAR: ICD-10-CM

## 2020-08-05 DIAGNOSIS — M62.830 LUMBAR PARASPINAL MUSCLE SPASM: ICD-10-CM

## 2020-08-05 RX ORDER — ATORVASTATIN CALCIUM 40 MG/1
40 TABLET, FILM COATED ORAL DAILY
Qty: 90 TABLET | Refills: 3 | Status: SHIPPED | OUTPATIENT
Start: 2020-08-05 | End: 2020-08-24 | Stop reason: SINTOL

## 2020-08-05 RX ORDER — CYCLOBENZAPRINE HCL 5 MG
5 TABLET ORAL NIGHTLY PRN
Qty: 30 TABLET | Refills: 2 | Status: SHIPPED | OUTPATIENT
Start: 2020-08-05

## 2020-08-05 RX ORDER — ATORVASTATIN CALCIUM 40 MG/1
40 TABLET, FILM COATED ORAL DAILY
Qty: 90 TABLET | Refills: 3 | Status: CANCELLED | OUTPATIENT
Start: 2020-08-05 | End: 2021-08-05

## 2020-08-05 RX ORDER — CYCLOBENZAPRINE HCL 5 MG
5 TABLET ORAL 3 TIMES DAILY PRN
Qty: 90 TABLET | Refills: 1 | OUTPATIENT
Start: 2020-08-05

## 2020-08-05 NOTE — TELEPHONE ENCOUNTER
----- Message from Prieto Waters sent at 8/4/2020  2:21 PM CDT -----  Regarding: Name of med to be refilled  Contact: Pt  Type:  Patient Returning Call    Who Called:  pt  Does the patient know what this is regarding?:  pt was told to call back to office for name of med that needed filled. Cyclobenzapr 10 mg  [facility] [address] [facility] [address] [place] [address]  Phone: [phone] Fax: [phone]  Best Call Back Number:  [phone]  Additional Information:  Please follow up. Thank you

## 2020-08-24 ENCOUNTER — OFFICE VISIT (OUTPATIENT)
Dept: FAMILY MEDICINE | Facility: CLINIC | Age: 75
End: 2020-08-24
Payer: MEDICARE

## 2020-08-24 VITALS
HEIGHT: 68 IN | BODY MASS INDEX: 30.21 KG/M2 | OXYGEN SATURATION: 97 % | TEMPERATURE: 98 F | HEART RATE: 82 BPM | SYSTOLIC BLOOD PRESSURE: 138 MMHG | WEIGHT: 199.31 LBS | DIASTOLIC BLOOD PRESSURE: 78 MMHG

## 2020-08-24 DIAGNOSIS — Z12.5 PROSTATE CANCER SCREENING: ICD-10-CM

## 2020-08-24 DIAGNOSIS — Z00.00 PERIODIC HEALTH ASSESSMENT, GENERAL SCREENING, ADULT: ICD-10-CM

## 2020-08-24 DIAGNOSIS — E78.2 MIXED HYPERLIPIDEMIA: Primary | ICD-10-CM

## 2020-08-24 DIAGNOSIS — Z12.11 COLON CANCER SCREENING: ICD-10-CM

## 2020-08-24 DIAGNOSIS — R53.83 FATIGUE, UNSPECIFIED TYPE: ICD-10-CM

## 2020-08-24 PROCEDURE — 99999 PR PBB SHADOW E&M-EST. PATIENT-LVL III: ICD-10-PCS | Mod: PBBFAC,HCNC,, | Performed by: PHYSICIAN ASSISTANT

## 2020-08-24 PROCEDURE — 1159F PR MEDICATION LIST DOCUMENTED IN MEDICAL RECORD: ICD-10-PCS | Mod: HCNC,S$GLB,, | Performed by: PHYSICIAN ASSISTANT

## 2020-08-24 PROCEDURE — 99397 PER PM REEVAL EST PAT 65+ YR: CPT | Mod: HCNC,S$GLB,, | Performed by: PHYSICIAN ASSISTANT

## 2020-08-24 PROCEDURE — 1159F MED LIST DOCD IN RCRD: CPT | Mod: HCNC,S$GLB,, | Performed by: PHYSICIAN ASSISTANT

## 2020-08-24 PROCEDURE — 99397 PR PREVENTIVE VISIT,EST,65 & OVER: ICD-10-PCS | Mod: HCNC,S$GLB,, | Performed by: PHYSICIAN ASSISTANT

## 2020-08-24 PROCEDURE — 99999 PR PBB SHADOW E&M-EST. PATIENT-LVL III: CPT | Mod: PBBFAC,HCNC,, | Performed by: PHYSICIAN ASSISTANT

## 2020-08-24 PROCEDURE — 1101F PR PT FALLS ASSESS DOC 0-1 FALLS W/OUT INJ PAST YR: ICD-10-PCS | Mod: HCNC,CPTII,S$GLB, | Performed by: PHYSICIAN ASSISTANT

## 2020-08-24 PROCEDURE — 1101F PT FALLS ASSESS-DOCD LE1/YR: CPT | Mod: HCNC,CPTII,S$GLB, | Performed by: PHYSICIAN ASSISTANT

## 2020-08-24 NOTE — PROGRESS NOTES
Subjective:       Patient ID: Luke Duff is a 75 y.o. male.    Chief Complaint: Annual Exam    HPI   Hyperlipidemia   routine check  Pt. needs labs  Last colon check 10 yrs ago  Pt. Does not want any immunizations although immunizations due  Review of Systems   Constitutional: Positive for fatigue. Negative for activity change, appetite change, chills, diaphoresis, fever and unexpected weight change.   HENT: Negative.    Eyes: Negative.    Respiratory: Negative.  Negative for cough and shortness of breath.    Cardiovascular: Negative.  Negative for chest pain and leg swelling.   Gastrointestinal: Negative.    Endocrine: Negative.    Genitourinary: Negative.    Musculoskeletal: Negative.    Integumentary:  Negative.   Neurological: Negative.          Objective:      Physical Exam  Vitals signs reviewed.   Constitutional:       General: He is not in acute distress.     Appearance: Normal appearance. He is obese. He is not ill-appearing, toxic-appearing or diaphoretic.   HENT:      Head: Normocephalic and atraumatic.      Right Ear: Tympanic membrane, ear canal and external ear normal. There is no impacted cerumen.      Left Ear: Tympanic membrane, ear canal and external ear normal. There is no impacted cerumen.      Mouth/Throat:      Mouth: Mucous membranes are moist.      Pharynx: Oropharynx is clear. No oropharyngeal exudate or posterior oropharyngeal erythema.   Eyes:      General: No scleral icterus.     Conjunctiva/sclera: Conjunctivae normal.   Neck:      Musculoskeletal: Normal range of motion and neck supple. No neck rigidity or muscular tenderness.      Vascular: No carotid bruit.   Cardiovascular:      Rate and Rhythm: Normal rate and regular rhythm.      Pulses: Normal pulses.      Heart sounds: Normal heart sounds. No murmur. No friction rub. No gallop.    Pulmonary:      Effort: Pulmonary effort is normal. No respiratory distress.      Breath sounds: Normal breath sounds. No stridor. No wheezing,  rhonchi or rales.   Abdominal:      General: Abdomen is flat. Bowel sounds are normal. There is no distension.      Palpations: Abdomen is soft. There is no mass.      Tenderness: There is no abdominal tenderness. There is no guarding or rebound.      Hernia: No hernia is present.   Musculoskeletal:         General: No swelling.      Right lower leg: No edema.      Left lower leg: No edema.   Lymphadenopathy:      Cervical: No cervical adenopathy.   Skin:     General: Skin is warm and dry.      Findings: No rash.   Neurological:      General: No focal deficit present.      Mental Status: He is alert and oriented to person, place, and time.         Assessment:       1. Mixed hyperlipidemia    2. Periodic health assessment, general screening, adult    3. Prostate cancer screening    4. Fatigue, unspecified type    5. Colon cancer screening        Plan:       Luke was seen today for annual exam.    Diagnoses and all orders for this visit:    Mixed hyperlipidemia  -     Comprehensive metabolic panel; Future  -     TSH; Future  -     CBC auto differential; Future  -     Lipid Panel; Future  -     Urinalysis; Future    Periodic health assessment, general screening, adult  -     Comprehensive metabolic panel; Future  -     TSH; Future  -     CBC auto differential; Future  -     PSA, total and free; Future  -     Hepatitis C Antibody; Future  -     HIV 1/2 Ag/Ab (4th Gen); Future  -     Urinalysis; Future    Prostate cancer screening  -     Comprehensive metabolic panel; Future  -     TSH; Future  -     CBC auto differential; Future  -     Urinalysis; Future    Fatigue, unspecified type  -     Comprehensive metabolic panel; Future  -     TSH; Future  -     CBC auto differential; Future  -     Urinalysis; Future    Colon cancer screening  -     Comprehensive metabolic panel; Future  -     TSH; Future  -     CBC auto differential; Future  -     Fecal Immunochemical Test (iFOBT); Future  -     Urinalysis; Future    discussed  diet and exercise  May need statin if lipids elevated

## 2020-08-26 ENCOUNTER — LAB VISIT (OUTPATIENT)
Dept: LAB | Facility: HOSPITAL | Age: 75
End: 2020-08-26
Payer: MEDICARE

## 2020-08-26 DIAGNOSIS — Z12.11 COLON CANCER SCREENING: ICD-10-CM

## 2020-08-26 PROCEDURE — 82274 ASSAY TEST FOR BLOOD FECAL: CPT | Mod: HCNC

## 2020-08-28 LAB — HEMOCCULT STL QL IA: NEGATIVE

## 2020-09-02 ENCOUNTER — LAB VISIT (OUTPATIENT)
Dept: LAB | Facility: HOSPITAL | Age: 75
End: 2020-09-02
Attending: PHYSICIAN ASSISTANT
Payer: MEDICARE

## 2020-09-02 DIAGNOSIS — Z12.5 PROSTATE CANCER SCREENING: ICD-10-CM

## 2020-09-02 DIAGNOSIS — R53.83 FATIGUE, UNSPECIFIED TYPE: ICD-10-CM

## 2020-09-02 DIAGNOSIS — E78.2 MIXED HYPERLIPIDEMIA: ICD-10-CM

## 2020-09-02 DIAGNOSIS — Z12.11 COLON CANCER SCREENING: ICD-10-CM

## 2020-09-02 DIAGNOSIS — Z00.00 PERIODIC HEALTH ASSESSMENT, GENERAL SCREENING, ADULT: ICD-10-CM

## 2020-09-02 LAB
BILIRUB UR QL STRIP: NEGATIVE
CLARITY UR: CLEAR
COLOR UR: YELLOW
GLUCOSE UR QL STRIP: NEGATIVE
HGB UR QL STRIP: NEGATIVE
KETONES UR QL STRIP: NEGATIVE
LEUKOCYTE ESTERASE UR QL STRIP: NEGATIVE
NITRITE UR QL STRIP: NEGATIVE
PH UR STRIP: 6 [PH] (ref 5–8)
PROT UR QL STRIP: NEGATIVE
SP GR UR STRIP: >=1.03 (ref 1–1.03)
URN SPEC COLLECT METH UR: ABNORMAL

## 2020-09-02 PROCEDURE — 81003 URINALYSIS AUTO W/O SCOPE: CPT | Mod: HCNC,PO

## 2020-10-16 ENCOUNTER — OFFICE VISIT (OUTPATIENT)
Dept: FAMILY MEDICINE | Facility: CLINIC | Age: 75
End: 2020-10-16
Payer: MEDICARE

## 2020-10-16 VITALS
TEMPERATURE: 97 F | WEIGHT: 200.19 LBS | SYSTOLIC BLOOD PRESSURE: 148 MMHG | BODY MASS INDEX: 30.34 KG/M2 | HEART RATE: 76 BPM | OXYGEN SATURATION: 97 % | HEIGHT: 68 IN | DIASTOLIC BLOOD PRESSURE: 82 MMHG

## 2020-10-16 DIAGNOSIS — F43.0 STRESS REACTION: ICD-10-CM

## 2020-10-16 DIAGNOSIS — R07.89 CHEST TIGHTNESS: Primary | ICD-10-CM

## 2020-10-16 PROCEDURE — 1126F PR PAIN SEVERITY QUANTIFIED, NO PAIN PRESENT: ICD-10-PCS | Mod: HCNC,S$GLB,, | Performed by: PHYSICIAN ASSISTANT

## 2020-10-16 PROCEDURE — 1101F PT FALLS ASSESS-DOCD LE1/YR: CPT | Mod: HCNC,CPTII,S$GLB, | Performed by: PHYSICIAN ASSISTANT

## 2020-10-16 PROCEDURE — 93010 EKG 12-LEAD: ICD-10-PCS | Mod: HCNC,S$GLB,, | Performed by: INTERNAL MEDICINE

## 2020-10-16 PROCEDURE — 1159F MED LIST DOCD IN RCRD: CPT | Mod: HCNC,S$GLB,, | Performed by: PHYSICIAN ASSISTANT

## 2020-10-16 PROCEDURE — 93010 ELECTROCARDIOGRAM REPORT: CPT | Mod: HCNC,S$GLB,, | Performed by: INTERNAL MEDICINE

## 2020-10-16 PROCEDURE — 99214 PR OFFICE/OUTPT VISIT, EST, LEVL IV, 30-39 MIN: ICD-10-PCS | Mod: HCNC,S$GLB,, | Performed by: PHYSICIAN ASSISTANT

## 2020-10-16 PROCEDURE — 99214 OFFICE O/P EST MOD 30 MIN: CPT | Mod: HCNC,S$GLB,, | Performed by: PHYSICIAN ASSISTANT

## 2020-10-16 PROCEDURE — 99999 PR PBB SHADOW E&M-EST. PATIENT-LVL III: ICD-10-PCS | Mod: PBBFAC,HCNC,, | Performed by: PHYSICIAN ASSISTANT

## 2020-10-16 PROCEDURE — 1126F AMNT PAIN NOTED NONE PRSNT: CPT | Mod: HCNC,S$GLB,, | Performed by: PHYSICIAN ASSISTANT

## 2020-10-16 PROCEDURE — 1159F PR MEDICATION LIST DOCUMENTED IN MEDICAL RECORD: ICD-10-PCS | Mod: HCNC,S$GLB,, | Performed by: PHYSICIAN ASSISTANT

## 2020-10-16 PROCEDURE — 93005 EKG 12-LEAD: ICD-10-PCS | Mod: HCNC,S$GLB,, | Performed by: PHYSICIAN ASSISTANT

## 2020-10-16 PROCEDURE — 93005 ELECTROCARDIOGRAM TRACING: CPT | Mod: HCNC,S$GLB,, | Performed by: PHYSICIAN ASSISTANT

## 2020-10-16 PROCEDURE — 1101F PR PT FALLS ASSESS DOC 0-1 FALLS W/OUT INJ PAST YR: ICD-10-PCS | Mod: HCNC,CPTII,S$GLB, | Performed by: PHYSICIAN ASSISTANT

## 2020-10-16 PROCEDURE — 99999 PR PBB SHADOW E&M-EST. PATIENT-LVL III: CPT | Mod: PBBFAC,HCNC,, | Performed by: PHYSICIAN ASSISTANT

## 2020-10-16 RX ORDER — CITALOPRAM 10 MG/1
10 TABLET ORAL DAILY
Qty: 30 TABLET | Refills: 11 | Status: SHIPPED | OUTPATIENT
Start: 2020-10-16 | End: 2020-10-16 | Stop reason: CLARIF

## 2020-10-16 RX ORDER — CITALOPRAM 10 MG/1
10 TABLET ORAL DAILY
Qty: 30 TABLET | Refills: 11 | Status: SHIPPED | OUTPATIENT
Start: 2020-10-16 | End: 2021-10-16

## 2020-10-16 NOTE — PROGRESS NOTES
Subjective:       Patient ID: Luke Duff is a 75 y.o. male.    Chief Complaint: Annual Exam    HPI   SOB on exertion  Tightness in chest  No radiation to neck or arms  Pt. Under a lot stress  Not sleeping well  Minor indigestion flare  Increased allergy sx x 1 wk.  Review of Systems   Constitutional: Negative.    HENT: Negative.    Eyes: Negative.    Respiratory: Positive for chest tightness. Negative for cough and shortness of breath.    Cardiovascular: Negative.  Negative for chest pain, palpitations, leg swelling and claudication.   Gastrointestinal: Negative.    Endocrine: Negative.    Genitourinary: Negative.    Musculoskeletal: Negative.    Integumentary:  Negative for rash. Negative.   Neurological: Negative.    Psychiatric/Behavioral: Positive for decreased concentration, dysphoric mood and sleep disturbance. Negative for agitation, behavioral problems, confusion, hallucinations, self-injury and suicidal ideas. The patient is nervous/anxious. The patient is not hyperactive.          Objective:      Physical Exam  Vitals signs reviewed.   Constitutional:       General: He is not in acute distress.     Appearance: Normal appearance. He is obese. He is not ill-appearing, toxic-appearing or diaphoretic.   HENT:      Head: Normocephalic and atraumatic.      Right Ear: Tympanic membrane, ear canal and external ear normal. There is no impacted cerumen.      Left Ear: Tympanic membrane, ear canal and external ear normal. There is no impacted cerumen.      Mouth/Throat:      Mouth: Mucous membranes are moist.      Pharynx: Oropharynx is clear. No oropharyngeal exudate or posterior oropharyngeal erythema.   Eyes:      General: No scleral icterus.     Conjunctiva/sclera: Conjunctivae normal.   Neck:      Musculoskeletal: Normal range of motion and neck supple. No neck rigidity or muscular tenderness.      Vascular: No carotid bruit.   Cardiovascular:      Rate and Rhythm: Normal rate and regular rhythm.       Pulses: Normal pulses.      Heart sounds: Normal heart sounds. No murmur. No friction rub. No gallop.    Pulmonary:      Effort: Pulmonary effort is normal. No respiratory distress.      Breath sounds: Normal breath sounds. No stridor. No wheezing, rhonchi or rales.   Abdominal:      General: Abdomen is flat. Bowel sounds are normal. There is no distension.      Palpations: Abdomen is soft. There is no mass.      Tenderness: There is no abdominal tenderness. There is no guarding or rebound.      Hernia: No hernia is present.   Musculoskeletal: Normal range of motion.         General: No swelling.      Right lower leg: No edema.      Left lower leg: No edema.   Lymphadenopathy:      Cervical: No cervical adenopathy.   Skin:     General: Skin is warm and dry.      Findings: No rash.   Neurological:      General: No focal deficit present.      Mental Status: He is alert and oriented to person, place, and time.   Psychiatric:         Mood and Affect: Mood normal.         Behavior: Behavior normal.         Thought Content: Thought content normal.         Judgment: Judgment normal.         Assessment:       1. Chest tightness    2. Stress reaction        Plan:       Luke was seen today for annual exam.    Diagnoses and all orders for this visit:    Chest tightness  -     EKG 12-lead; Future    Stress reaction  -     Discontinue: citalopram (CELEXA) 10 MG tablet; Take 1 tablet (10 mg total) by mouth once daily.  -     citalopram (CELEXA) 10 MG tablet; Take 1 tablet (10 mg total) by mouth once daily.    EKG appears stable RBBB  F/u check 3 or 4 wks

## 2020-12-06 ENCOUNTER — PATIENT OUTREACH (OUTPATIENT)
Dept: ADMINISTRATIVE | Facility: OTHER | Age: 75
End: 2020-12-06

## 2020-12-06 NOTE — PROGRESS NOTES
Chart was reviewed for overdue [facility]ters (JACQUI)  topics  Updates were requested from care everywhere  Health Maintenance has been updated  [facility] immunization registry triggered

## 2021-03-08 ENCOUNTER — TELEPHONE (OUTPATIENT)
Dept: FAMILY MEDICINE | Facility: CLINIC | Age: 76
End: 2021-03-08

## 2021-03-08 ENCOUNTER — OFFICE VISIT (OUTPATIENT)
Dept: FAMILY MEDICINE | Facility: CLINIC | Age: 76
End: 2021-03-08
Payer: MEDICARE

## 2021-03-08 VITALS
SYSTOLIC BLOOD PRESSURE: 131 MMHG | HEART RATE: 94 BPM | BODY MASS INDEX: 30.37 KG/M2 | HEIGHT: 68 IN | TEMPERATURE: 97 F | DIASTOLIC BLOOD PRESSURE: 79 MMHG | OXYGEN SATURATION: 98 % | WEIGHT: 200.38 LBS

## 2021-03-08 DIAGNOSIS — R68.83 CHILLS: ICD-10-CM

## 2021-03-08 DIAGNOSIS — R94.31 ABNORMAL EKG: ICD-10-CM

## 2021-03-08 DIAGNOSIS — B34.9 VIRAL ILLNESS: ICD-10-CM

## 2021-03-08 DIAGNOSIS — R05.9 COUGH: ICD-10-CM

## 2021-03-08 DIAGNOSIS — Z01.818 PRE-OP EVALUATION: Primary | ICD-10-CM

## 2021-03-08 PROCEDURE — 1159F PR MEDICATION LIST DOCUMENTED IN MEDICAL RECORD: ICD-10-PCS | Mod: S$GLB,,, | Performed by: PHYSICIAN ASSISTANT

## 2021-03-08 PROCEDURE — 1159F MED LIST DOCD IN RCRD: CPT | Mod: S$GLB,,, | Performed by: PHYSICIAN ASSISTANT

## 2021-03-08 PROCEDURE — 93010 ELECTROCARDIOGRAM REPORT: CPT | Mod: S$GLB,ICN,, | Performed by: INTERNAL MEDICINE

## 2021-03-08 PROCEDURE — 99214 PR OFFICE/OUTPT VISIT, EST, LEVL IV, 30-39 MIN: ICD-10-PCS | Mod: S$GLB,,, | Performed by: PHYSICIAN ASSISTANT

## 2021-03-08 PROCEDURE — 99214 OFFICE O/P EST MOD 30 MIN: CPT | Mod: S$GLB,,, | Performed by: PHYSICIAN ASSISTANT

## 2021-03-08 PROCEDURE — U0005 INFEC AGEN DETEC AMPLI PROBE: HCPCS | Performed by: PHYSICIAN ASSISTANT

## 2021-03-08 PROCEDURE — 1101F PR PT FALLS ASSESS DOC 0-1 FALLS W/OUT INJ PAST YR: ICD-10-PCS | Mod: CPTII,S$GLB,, | Performed by: PHYSICIAN ASSISTANT

## 2021-03-08 PROCEDURE — 93010 EKG 12-LEAD: ICD-10-PCS | Mod: S$GLB,ICN,, | Performed by: INTERNAL MEDICINE

## 2021-03-08 PROCEDURE — 3288F PR FALLS RISK ASSESSMENT DOCUMENTED: ICD-10-PCS | Mod: CPTII,S$GLB,, | Performed by: PHYSICIAN ASSISTANT

## 2021-03-08 PROCEDURE — 1125F AMNT PAIN NOTED PAIN PRSNT: CPT | Mod: S$GLB,,, | Performed by: PHYSICIAN ASSISTANT

## 2021-03-08 PROCEDURE — 3288F FALL RISK ASSESSMENT DOCD: CPT | Mod: CPTII,S$GLB,, | Performed by: PHYSICIAN ASSISTANT

## 2021-03-08 PROCEDURE — 1125F PR PAIN SEVERITY QUANTIFIED, PAIN PRESENT: ICD-10-PCS | Mod: S$GLB,,, | Performed by: PHYSICIAN ASSISTANT

## 2021-03-08 PROCEDURE — 99999 PR PBB SHADOW E&M-EST. PATIENT-LVL V: ICD-10-PCS | Mod: PBBFAC,,, | Performed by: PHYSICIAN ASSISTANT

## 2021-03-08 PROCEDURE — 93005 ELECTROCARDIOGRAM TRACING: CPT | Mod: S$GLB,,, | Performed by: PHYSICIAN ASSISTANT

## 2021-03-08 PROCEDURE — 99999 PR PBB SHADOW E&M-EST. PATIENT-LVL V: CPT | Mod: PBBFAC,,, | Performed by: PHYSICIAN ASSISTANT

## 2021-03-08 PROCEDURE — U0003 INFECTIOUS AGENT DETECTION BY NUCLEIC ACID (DNA OR RNA); SEVERE ACUTE RESPIRATORY SYNDROME CORONAVIRUS 2 (SARS-COV-2) (CORONAVIRUS DISEASE [COVID-19]), AMPLIFIED PROBE TECHNIQUE, MAKING USE OF HIGH THROUGHPUT TECHNOLOGIES AS DESCRIBED BY CMS-2020-01-R: HCPCS | Performed by: PHYSICIAN ASSISTANT

## 2021-03-08 PROCEDURE — 1101F PT FALLS ASSESS-DOCD LE1/YR: CPT | Mod: CPTII,S$GLB,, | Performed by: PHYSICIAN ASSISTANT

## 2021-03-08 PROCEDURE — 93005 EKG 12-LEAD: ICD-10-PCS | Mod: S$GLB,,, | Performed by: PHYSICIAN ASSISTANT

## 2021-03-09 LAB — SARS-COV-2 RNA RESP QL NAA+PROBE: NOT DETECTED

## 2021-03-11 ENCOUNTER — TELEPHONE (OUTPATIENT)
Dept: FAMILY MEDICINE | Facility: CLINIC | Age: 76
End: 2021-03-11

## 2021-03-22 ENCOUNTER — HOSPITAL ENCOUNTER (OUTPATIENT)
Dept: RADIOLOGY | Facility: HOSPITAL | Age: 76
Discharge: HOME | End: 2021-03-22
Attending: PHYSICIAN ASSISTANT
Payer: MEDICARE

## 2021-03-22 DIAGNOSIS — Z01.818 PRE-OP EVALUATION: ICD-10-CM

## 2021-03-22 PROCEDURE — 71046 X-RAY EXAM CHEST 2 VIEWS: CPT | Mod: TC,FY,PO

## 2021-03-22 PROCEDURE — 71046 X-RAY EXAM CHEST 2 VIEWS: CPT | Mod: 26,,, | Performed by: RADIOLOGY

## 2021-03-22 PROCEDURE — 71046 XR CHEST PA AND LATERAL: ICD-10-PCS | Mod: 26,,, | Performed by: RADIOLOGY

## 2021-03-25 ENCOUNTER — TELEPHONE (OUTPATIENT)
Dept: FAMILY MEDICINE | Facility: CLINIC | Age: 76
End: 2021-03-25

## 2021-03-25 DIAGNOSIS — F43.0 STRESS REACTION: ICD-10-CM

## 2021-03-31 ENCOUNTER — OFFICE VISIT (OUTPATIENT)
Dept: CARDIOLOGY | Facility: CLINIC | Age: 76
End: 2021-03-31
Payer: MEDICARE

## 2021-03-31 VITALS
HEIGHT: 68 IN | BODY MASS INDEX: 30.34 KG/M2 | HEART RATE: 83 BPM | WEIGHT: 200.19 LBS | SYSTOLIC BLOOD PRESSURE: 173 MMHG | DIASTOLIC BLOOD PRESSURE: 80 MMHG

## 2021-03-31 DIAGNOSIS — R94.31 ABNORMAL EKG: ICD-10-CM

## 2021-03-31 DIAGNOSIS — E78.2 MIXED HYPERLIPIDEMIA: ICD-10-CM

## 2021-03-31 DIAGNOSIS — R03.0 ELEVATED BLOOD PRESSURE READING WITHOUT DIAGNOSIS OF HYPERTENSION: ICD-10-CM

## 2021-03-31 DIAGNOSIS — Z01.818 PRE-OP EVALUATION: ICD-10-CM

## 2021-03-31 PROCEDURE — 3288F PR FALLS RISK ASSESSMENT DOCUMENTED: ICD-10-PCS | Mod: CPTII,S$GLB,, | Performed by: INTERNAL MEDICINE

## 2021-03-31 PROCEDURE — 1101F PT FALLS ASSESS-DOCD LE1/YR: CPT | Mod: CPTII,S$GLB,, | Performed by: INTERNAL MEDICINE

## 2021-03-31 PROCEDURE — 93000 EKG 12-LEAD: ICD-10-PCS | Mod: S$GLB,,, | Performed by: INTERNAL MEDICINE

## 2021-03-31 PROCEDURE — 99999 PR PBB SHADOW E&M-EST. PATIENT-LVL III: CPT | Mod: PBBFAC,,, | Performed by: INTERNAL MEDICINE

## 2021-03-31 PROCEDURE — 3288F FALL RISK ASSESSMENT DOCD: CPT | Mod: CPTII,S$GLB,, | Performed by: INTERNAL MEDICINE

## 2021-03-31 PROCEDURE — 1159F MED LIST DOCD IN RCRD: CPT | Mod: S$GLB,,, | Performed by: INTERNAL MEDICINE

## 2021-03-31 PROCEDURE — 93000 ELECTROCARDIOGRAM COMPLETE: CPT | Mod: S$GLB,,, | Performed by: INTERNAL MEDICINE

## 2021-03-31 PROCEDURE — 1126F PR PAIN SEVERITY QUANTIFIED, NO PAIN PRESENT: ICD-10-PCS | Mod: S$GLB,,, | Performed by: INTERNAL MEDICINE

## 2021-03-31 PROCEDURE — 99214 PR OFFICE/OUTPT VISIT, EST, LEVL IV, 30-39 MIN: ICD-10-PCS | Mod: S$GLB,,, | Performed by: INTERNAL MEDICINE

## 2021-03-31 PROCEDURE — 1126F AMNT PAIN NOTED NONE PRSNT: CPT | Mod: S$GLB,,, | Performed by: INTERNAL MEDICINE

## 2021-03-31 PROCEDURE — 99999 PR PBB SHADOW E&M-EST. PATIENT-LVL III: ICD-10-PCS | Mod: PBBFAC,,, | Performed by: INTERNAL MEDICINE

## 2021-03-31 PROCEDURE — 1101F PR PT FALLS ASSESS DOC 0-1 FALLS W/OUT INJ PAST YR: ICD-10-PCS | Mod: CPTII,S$GLB,, | Performed by: INTERNAL MEDICINE

## 2021-03-31 PROCEDURE — 1159F PR MEDICATION LIST DOCUMENTED IN MEDICAL RECORD: ICD-10-PCS | Mod: S$GLB,,, | Performed by: INTERNAL MEDICINE

## 2021-03-31 PROCEDURE — 99214 OFFICE O/P EST MOD 30 MIN: CPT | Mod: S$GLB,,, | Performed by: INTERNAL MEDICINE

## 2021-06-18 ENCOUNTER — PES CALL (OUTPATIENT)
Dept: ADMINISTRATIVE | Facility: CLINIC | Age: 76
End: 2021-06-18

## 2021-08-09 ENCOUNTER — PES CALL (OUTPATIENT)
Dept: ADMINISTRATIVE | Facility: CLINIC | Age: 76
End: 2021-08-09

## 2021-09-14 DIAGNOSIS — K21.00 GASTROESOPHAGEAL REFLUX DISEASE WITH ESOPHAGITIS: ICD-10-CM

## 2021-09-14 RX ORDER — OMEPRAZOLE 40 MG/1
40 CAPSULE, DELAYED RELEASE ORAL DAILY
Qty: 90 CAPSULE | Refills: 3 | Status: SHIPPED | OUTPATIENT
Start: 2021-09-14 | End: 2022-09-14

## 2022-07-22 NOTE — LETTER
December 18, 2019      Janis Guzman, ERIK  [address] [facility] [address] [place] [address]           [facility]  [address] [place] [address]  Phone: [phone]  Fax: [phone]          Patient: Luke Duff   MR Number: [id number]   YOB: 1945   Date of Visit: 12/18/2019       Dear Janis Guzman:    Thank you for referring Luke Duff to me for evaluation. Attached you will find relevant portions of my assessment and plan of care.    If you have questions, please do not hesitate to call me. I look forward to following Luke Duff along with you.    Sincerely,    Mary Villalta MD    Enclosure  CC:  No Recipients    If you would like to receive this communication electronically, please contact [email or web address] or [phone] to request more information on [employer] access.    For providers and/or their staff who would like to refer a patient to [facility], please contact us through our one-stop-shop provider referral line, [facility], at [phone].    If you feel you have received this communication in error or would no longer like to receive these types of communications, please e-mail [email or web address]          Cephalexin Pregnancy And Lactation Text: This medication is Pregnancy Category B and considered safe during pregnancy.  It is also excreted in breast milk but can be used safely for shorter doses.

## 2022-09-02 ENCOUNTER — PES CALL (OUTPATIENT)
Dept: ADMINISTRATIVE | Facility: CLINIC | Age: 77
End: 2022-09-02
Payer: MEDICARE

## 2022-11-07 ENCOUNTER — PES CALL (OUTPATIENT)
Dept: ADMINISTRATIVE | Facility: CLINIC | Age: 77
End: 2022-11-07
Payer: MEDICARE

## 2022-12-07 ENCOUNTER — PES CALL (OUTPATIENT)
Dept: ADMINISTRATIVE | Facility: CLINIC | Age: 77
End: 2022-12-07
Payer: MEDICARE

## (undated) DEVICE — MARKER SKIN STND TIP BLUE BARR

## (undated) DEVICE — SPONGE DERMACEA 4X4IN 12PLY

## (undated) DEVICE — SEE MEDLINE ITEM 152622

## (undated) DEVICE — GLOVE SURGICAL LATEX SZ 7

## (undated) DEVICE — CHLORAPREP 10.5 ML APPLICATOR

## (undated) DEVICE — NDL SPINAL SPINOCAN 22GX3.5

## (undated) DEVICE — TRAY NERVE BLOCK

## (undated) DEVICE — PAD ELECTROSURGICAL PAT PLATE

## (undated) DEVICE — NDL 18GA X1 1/2 REG BEVEL

## (undated) DEVICE — CANNULA CVD 100MM X 20G

## (undated) DEVICE — APPLICATOR CHLORAPREP CLR 10.5